# Patient Record
Sex: MALE | Race: WHITE
[De-identification: names, ages, dates, MRNs, and addresses within clinical notes are randomized per-mention and may not be internally consistent; named-entity substitution may affect disease eponyms.]

---

## 2016-12-28 VITALS
DIASTOLIC BLOOD PRESSURE: 89 MMHG | SYSTOLIC BLOOD PRESSURE: 156 MMHG | SYSTOLIC BLOOD PRESSURE: 156 MMHG | DIASTOLIC BLOOD PRESSURE: 89 MMHG | DIASTOLIC BLOOD PRESSURE: 89 MMHG | SYSTOLIC BLOOD PRESSURE: 156 MMHG

## 2017-04-18 ENCOUNTER — HOSPITAL ENCOUNTER (OUTPATIENT)
Dept: HOSPITAL 61 - PMGWOUND | Age: 57
Discharge: HOME | End: 2017-04-18
Attending: EMERGENCY MEDICINE
Payer: COMMERCIAL

## 2017-04-18 DIAGNOSIS — L89.153: Primary | ICD-10-CM

## 2017-04-18 DIAGNOSIS — Z87.891: ICD-10-CM

## 2017-04-18 DIAGNOSIS — L98.8: ICD-10-CM

## 2017-04-18 DIAGNOSIS — M19.90: ICD-10-CM

## 2017-04-18 PROCEDURE — 99214 OFFICE O/P EST MOD 30 MIN: CPT

## 2018-08-02 ENCOUNTER — HOSPITAL ENCOUNTER (OUTPATIENT)
Dept: HOSPITAL 61 - PMGWOUND | Age: 58
Discharge: HOME | End: 2018-08-02
Attending: EMERGENCY MEDICINE
Payer: COMMERCIAL

## 2018-08-02 DIAGNOSIS — T81.31XD: Primary | ICD-10-CM

## 2018-08-02 DIAGNOSIS — Z87.891: ICD-10-CM

## 2018-08-02 DIAGNOSIS — M19.90: ICD-10-CM

## 2018-08-02 DIAGNOSIS — Z85.048: ICD-10-CM
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
## 2018-08-02 PROCEDURE — 99213 OFFICE O/P EST LOW 20 MIN: CPT

## 2018-08-09 ENCOUNTER — HOSPITAL ENCOUNTER (OUTPATIENT)
Dept: HOSPITAL 61 - MRI | Age: 58
Discharge: HOME | End: 2018-08-09
Attending: EMERGENCY MEDICINE
Payer: COMMERCIAL

## 2018-08-09 DIAGNOSIS — Z85.048: ICD-10-CM

## 2018-08-09 DIAGNOSIS — Z87.891: ICD-10-CM

## 2018-08-09 DIAGNOSIS — T81.31XD: Primary | ICD-10-CM

## 2018-08-09 DIAGNOSIS — L89.159: ICD-10-CM

## 2018-08-09 PROCEDURE — 85025 COMPLETE CBC W/AUTO DIFF WBC: CPT

## 2018-08-09 PROCEDURE — 80069 RENAL FUNCTION PANEL: CPT

## 2018-08-09 PROCEDURE — A9585 GADOBUTROL INJECTION: HCPCS

## 2018-08-09 PROCEDURE — 85651 RBC SED RATE NONAUTOMATED: CPT

## 2018-08-09 PROCEDURE — 72197 MRI PELVIS W/O & W/DYE: CPT

## 2018-08-09 PROCEDURE — 36415 COLL VENOUS BLD VENIPUNCTURE: CPT

## 2018-08-09 NOTE — RAD
Examination: MRI of the pelvis without and with IV contrast

 

HISTORY: History of sacral pain, history of osteomyelitis, history of open

ulcer, history of bowel resection

 

COMPARISON: None available

 

TECHNIQUE: Multiplanar, sequence MR imaging of the pelvis were performed 

without and with IV contrast. IV contrast used was 10 mL of gadavist

 

FINDINGS:

 

There is a sacral decubitus ulcer in the lower back distal to the sacrum 

best visualized on series 10 image #15 with focal fluid collection 

measuring 2.2 x 1.3 cm could be secondary to decubitus ulcer with probable

abscess or phlegmon or fluid in the fistulous tract. The tract appears to 

extend into the posterior perineum along the right medial buttock possibly

to the anal region, however examination is limited due to significant 

motion artifact. The ulcer extends appears to extend inferior to the 

distal sacral coccygeal region however the distal portion the coccygeus 

demonstrates a decreased T1 and T2 signal could be osteomyelitis or due to

prior surgical changes.

 

IMPRESSION:

 

1. Sacral ulcer identified in the low back with 2.2 cm fluid collection 

could be abscess or phlegmon or fluid in the fistulous tract. The tract 

from the ulcer extends into the posterior peritoneum along the right 

medial buttock possibly to the anal region suggesting possible fistula.

 

2. There is decreased T1, T2 signal identified in the distal coccygeal 

region could be osteomyelitis or prior surgical changes. Recommend 

clinical correlation. Also consider bone scan and bony CT pelvis for 

further evaluation.

 

Electronically signed by: Alireza Vick MD (8/9/2018 12:21 PM) [facility]

## 2018-08-10 LAB
ALBUMIN SERPL-MCNC: 4 G/DL (ref 3.4–5)
ANION GAP SERPL CALC-SCNC: 9 MMOL/L (ref 6–14)
BASOPHILS # BLD AUTO: 0 X10^3/UL (ref 0–0.2)
BASOPHILS NFR BLD: 1 % (ref 0–3)
BUN SERPL-MCNC: 23 MG/DL (ref 8–26)
CALCIUM SERPL-MCNC: 9.8 MG/DL (ref 8.5–10.1)
CHLORIDE SERPL-SCNC: 103 MMOL/L (ref 98–107)
CO2 SERPL-SCNC: 28 MMOL/L (ref 21–32)
CREAT SERPL-MCNC: 1.3 MG/DL (ref 0.7–1.3)
EOSINOPHIL NFR BLD: 0.1 X10^3/UL (ref 0–0.7)
EOSINOPHIL NFR BLD: 1 % (ref 0–3)
ERYTHROCYTE [DISTWIDTH] IN BLOOD BY AUTOMATED COUNT: 13.7 % (ref 11.5–14.5)
GFR SERPLBLD BASED ON 1.73 SQ M-ARVRAT: 56.5 ML/MIN
GLUCOSE SERPL-MCNC: 117 MG/DL (ref 70–99)
HCT VFR BLD CALC: 48.4 % (ref 39–53)
HGB BLD-MCNC: 16.9 G/DL (ref 13–17.5)
LYMPHOCYTES # BLD: 1.9 X10^3/UL (ref 1–4.8)
LYMPHOCYTES NFR BLD AUTO: 21 % (ref 24–48)
MCH RBC QN AUTO: 33 PG (ref 25–35)
MCHC RBC AUTO-ENTMCNC: 35 G/DL (ref 31–37)
MCV RBC AUTO: 95 FL (ref 79–100)
MONO #: 0.9 X10^3/UL (ref 0–1.1)
MONOCYTES NFR BLD: 10 % (ref 0–9)
NEUT #: 6.1 X10^3UL (ref 1.8–7.7)
NEUTROPHILS NFR BLD AUTO: 68 % (ref 31–73)
PHOSPHATE SERPL-MCNC: 2.9 MG/DL (ref 2.6–4.7)
PLATELET # BLD AUTO: 197 X10^3/UL (ref 140–400)
POTASSIUM SERPL-SCNC: 3.9 MMOL/L (ref 3.5–5.1)
RBC # BLD AUTO: 5.11 X10^6/UL (ref 4.3–5.7)
SODIUM SERPL-SCNC: 140 MMOL/L (ref 136–145)
WBC # BLD AUTO: 9.1 X10^3/UL (ref 4–11)

## 2018-08-23 ENCOUNTER — HOSPITAL ENCOUNTER (OUTPATIENT)
Dept: HOSPITAL 61 - PMGWOUND | Age: 58
Discharge: HOME | End: 2018-08-23
Attending: EMERGENCY MEDICINE
Payer: COMMERCIAL

## 2018-08-23 DIAGNOSIS — M46.28: ICD-10-CM

## 2018-08-23 DIAGNOSIS — T81.31XS: Primary | ICD-10-CM

## 2018-08-23 DIAGNOSIS — M19.90: ICD-10-CM

## 2018-08-23 DIAGNOSIS — Z87.891: ICD-10-CM

## 2018-08-23 DIAGNOSIS — Z85.048: ICD-10-CM
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
## 2018-08-23 PROCEDURE — 99213 OFFICE O/P EST LOW 20 MIN: CPT

## 2018-08-23 PROCEDURE — 85651 RBC SED RATE NONAUTOMATED: CPT

## 2018-08-23 PROCEDURE — 80069 RENAL FUNCTION PANEL: CPT

## 2018-08-23 PROCEDURE — 36415 COLL VENOUS BLD VENIPUNCTURE: CPT

## 2018-08-23 PROCEDURE — 85025 COMPLETE CBC W/AUTO DIFF WBC: CPT

## 2018-09-11 ENCOUNTER — HOSPITAL ENCOUNTER (OUTPATIENT)
Dept: HOSPITAL 61 - SURG | Age: 58
Setting detail: OBSERVATION
LOS: 2 days | Discharge: HOME HEALTH | End: 2018-09-13
Attending: SURGERY | Admitting: SURGERY
Payer: COMMERCIAL

## 2018-09-11 VITALS — DIASTOLIC BLOOD PRESSURE: 51 MMHG | SYSTOLIC BLOOD PRESSURE: 96 MMHG

## 2018-09-11 VITALS — BODY MASS INDEX: 34.54 KG/M2 | WEIGHT: 220.06 LBS | HEIGHT: 67 IN

## 2018-09-11 VITALS — SYSTOLIC BLOOD PRESSURE: 115 MMHG | DIASTOLIC BLOOD PRESSURE: 64 MMHG

## 2018-09-11 VITALS — DIASTOLIC BLOOD PRESSURE: 41 MMHG | SYSTOLIC BLOOD PRESSURE: 91 MMHG

## 2018-09-11 VITALS — SYSTOLIC BLOOD PRESSURE: 129 MMHG | DIASTOLIC BLOOD PRESSURE: 74 MMHG

## 2018-09-11 DIAGNOSIS — Z93.3: ICD-10-CM

## 2018-09-11 DIAGNOSIS — K50.90: ICD-10-CM

## 2018-09-11 DIAGNOSIS — F17.210: ICD-10-CM

## 2018-09-11 DIAGNOSIS — K21.9: ICD-10-CM

## 2018-09-11 DIAGNOSIS — Z90.49: ICD-10-CM

## 2018-09-11 DIAGNOSIS — M46.28: ICD-10-CM

## 2018-09-11 DIAGNOSIS — L89.153: Primary | ICD-10-CM

## 2018-09-11 PROCEDURE — G0378 HOSPITAL OBSERVATION PER HR: HCPCS

## 2018-09-11 PROCEDURE — 97166 OT EVAL MOD COMPLEX 45 MIN: CPT

## 2018-09-11 PROCEDURE — 72192 CT PELVIS W/O DYE: CPT

## 2018-09-11 PROCEDURE — 15931 EXC SACRAL PR ULC PRIM SUTR: CPT

## 2018-09-11 PROCEDURE — A7015 AEROSOL MASK USED W NEBULIZE: HCPCS

## 2018-09-11 PROCEDURE — 97161 PT EVAL LOW COMPLEX 20 MIN: CPT

## 2018-09-11 PROCEDURE — G0379 DIRECT REFER HOSPITAL OBSERV: HCPCS

## 2018-09-11 PROCEDURE — 88304 TISSUE EXAM BY PATHOLOGIST: CPT

## 2018-09-11 PROCEDURE — 96372 THER/PROPH/DIAG INJ SC/IM: CPT

## 2018-09-11 RX ADMIN — SODIUM CHLORIDE, SODIUM LACTATE, POTASSIUM CHLORIDE, AND CALCIUM CHLORIDE SCH MLS/HR: .6; .31; .03; .02 INJECTION, SOLUTION INTRAVENOUS at 14:00

## 2018-09-11 RX ADMIN — ENOXAPARIN SODIUM SCH MG: 40 INJECTION SUBCUTANEOUS at 20:55

## 2018-09-11 RX ADMIN — SODIUM CHLORIDE, SODIUM LACTATE, POTASSIUM CHLORIDE, AND CALCIUM CHLORIDE SCH MLS/HR: .6; .31; .03; .02 INJECTION, SOLUTION INTRAVENOUS at 09:32

## 2018-09-11 RX ADMIN — SODIUM CHLORIDE, SODIUM LACTATE, POTASSIUM CHLORIDE, AND CALCIUM CHLORIDE SCH MLS/HR: .6; .31; .03; .02 INJECTION, SOLUTION INTRAVENOUS at 07:05

## 2018-09-11 RX ADMIN — DOCUSATE SODIUM SCH MG: 100 CAPSULE, LIQUID FILLED ORAL at 20:54

## 2018-09-11 RX ADMIN — HYDROCODONE BITARTRATE AND ACETAMINOPHEN PRN TAB: 5; 325 TABLET ORAL at 16:40

## 2018-09-11 RX ADMIN — SODIUM CHLORIDE, SODIUM LACTATE, POTASSIUM CHLORIDE, AND CALCIUM CHLORIDE SCH MLS/HR: .6; .31; .03; .02 INJECTION, SOLUTION INTRAVENOUS at 19:05

## 2018-09-11 NOTE — PDOC2
CONSULT


Date of Consult


Date of Consult


DATE: 9/11/18 


TIME: 14:43





Reason for Consult


Reason for Consult:


Negative pressure wound therapy placement and concern for chronic refractory 

osteomyelitis of the sacrum





Referring Physician


Referring Physician:


Dr. Davalos





Identification/Chief Complaint


Chief Complaint


Nonhealing sacral wound with concern for tracking to infectious site





Source


Source:  Chart review, Patient





History of Present Illness


Reason for Visit:


This is a very pleasant 58-year-old patient known to the wound care center for 

nonhealing perineal wound sits site of perineal resection. Concern has been 

raised on MRI for chronic sacral osteomyelitis. Patient has a remote history of 

sacral osteomyelitis and it is believed he underwent appropriate prolonged IV 

antibiotic therapy. Today Dr. Davalos performed debridement on the wound to 

identify any infectious tracking. At surgery bony involvement was not apparent. 

He is examined today for placement negative pressure wound therapy and follow-

up of MRI sacral osteomyelitis concern. 


Patient is examined in the bed. He is comfortable with postsurgical dressing in 

place.


Patient has had multiple surgical procedures including ileostomy and history of 

total colectomy and proctectomy.





Past Medical History


Past Medical History


Negative except as reported below


GI:  GERD, Other (past medical history of Crohn's)


Psych:  Depression


Endocrine:  Other (chronic nonhealing wounds that site of perineal resection.)





Past Surgical History


Past Surgical History:  Colon Resection, Other (proctectomy)





Social History


No


ALCOHOL:  none


Lives:  Alone


Domestic Violence:  Neg





Current Problem List


Problem List


Nonhealing sacral wound





Current Medications


Current Medications





Current Medications


Ondansetron HCl (Zofran) 4 mg PRN Q6HRS  PRN IV NAUSEA/VOMITING;  Start 9/11/18 

at 07:00;  Stop 9/12/18 at 06:59


Fentanyl Citrate (Fentanyl 2ml Vial) 25 mcg PRN Q5MIN  PRN IV MILD PAIN;  Start 

9/11/18 at 07:00;  Stop 9/12/18 at 06:59


Fentanyl Citrate (Fentanyl 2ml Vial) 50 mcg PRN Q5MIN  PRN IV MODERATE TO 

SEVERE PAIN;  Start 9/11/18 at 07:00;  Stop 9/12/18 at 06:59


Morphine Sulfate (Morphine Sulfate) 1 mg PRN Q10MIN  PRN IV SEVERE PAIN;  Start 

9/11/18 at 07:00;  Stop 9/12/18 at 06:59


Ringer's Solution 1,000 ml @  30 mls/hr Q24H IV  Last administered on 9/11/18at 

09:32;  Start 9/11/18 at 07:00;  Stop 9/11/18 at 18:59


Lidocaine HCl (Xylocaine-Mpf 1% 2ml Vial) 2 ml PRN 1X  PRN ID PRIOR TO IV START

;  Start 9/11/18 at 07:00;  Stop 9/12/18 at 06:59


Hydromorphone HCl (Dilaudid) 0.5 mg PRN Q10MIN  PRN IV SEV PAIN, Second choice;

  Start 9/11/18 at 07:00;  Stop 9/12/18 at 06:59


Prochlorperazine Edisylate (Compazine) 5 mg PACU PRN  PRN IV NAUSEA, MRX1;  

Start 9/11/18 at 07:00;  Stop 9/12/18 at 06:59


Propofol 20 ml @ As Directed STK-MED ONCE IV ;  Start 9/11/18 at 07:44;  Stop 9/ 11/18 at 07:45;  Status DC


Dexamethasone Sodium Phosphate (Decadron) 20 mg STK-MED ONCE .ROUTE ;  Start 9/ 11/18 at 07:44;  Stop 9/11/18 at 07:45;  Status DC


Ondansetron HCl (Zofran) 4 mg STK-MED ONCE .ROUTE ;  Start 9/11/18 at 07:44;  

Stop 9/11/18 at 07:45;  Status DC


Rocuronium Bromide (Zemuron) 50 mg STK-MED ONCE .ROUTE ;  Start 9/11/18 at 07:44

;  Stop 9/11/18 at 07:45;  Status DC


Fentanyl Citrate (Fentanyl 2ml Vial) 100 mcg STK-MED ONCE .ROUTE ;  Start 9/11/ 18 at 07:45;  Stop 9/11/18 at 07:46;  Status DC


Midazolam HCl (Versed) 2 mg STK-MED ONCE .ROUTE ;  Start 9/11/18 at 07:45;  

Stop 9/11/18 at 07:46;  Status DC


Multi-Ingred Cream/Lotion/Oil/ Oint (Artificial Tears Eye Ointment) 7 lakisha STK-

MED ONCE .ROUTE ;  Start 9/11/18 at 07:45;  Stop 9/11/18 at 07:46;  Status DC


Cefazolin Sodium/ Dextrose 50 ml @  100 mls/hr 1X  ONCE IV  Last administered 

on 9/11/18at 08:05;  Start 9/11/18 at 08:00;  Stop 9/11/18 at 08:29;  Status DC


Desflurane (Suprane) 30 ml STK-MED ONCE IH ;  Start 9/11/18 at 08:20;  Stop 9/11 /18 at 08:21;  Status DC


Neostigmine Methylsulfate (Neostigmine Methylsulfate) 5 mg STK-MED ONCE .ROUTE 

;  Start 9/11/18 at 08:47;  Stop 9/11/18 at 08:48;  Status DC


Glycopyrrolate (Robinul) 1 mg STK-MED ONCE .ROUTE ;  Start 9/11/18 at 08:48;  

Stop 9/11/18 at 08:49;  Status DC


Sevoflurane (Ultane) 8 ml STK-MED ONCE IH ;  Start 9/11/18 at 08:52;  Stop 9/11/ 18 at 08:53;  Status DC


Sevoflurane (Ultane) 8 ml STK-MED ONCE IH ;  Start 9/11/18 at 08:52;  Stop 9/11/ 18 at 08:53;  Status DC


Enoxaparin Sodium (Lovenox 40mg Syringe) 40 mg Q24H SQ ;  Start 9/11/18 at 21:00


Sodium Chloride (Normal Saline Flush) 3 ml QSHIFT  PRN IV AFTER MEDS AND BLOOD 

DRAWS;  Start 9/11/18 at 09:15


Ringer's Solution 1,000 ml @  100 mls/hr Q10H IV ;  Start 9/11/18 at 09:11


Acetaminophen/ Hydrocodone Bitart (Lortab 5/325) 1 tab PRN Q4HRS  PRN PO MILD 

PAIN;  Start 9/11/18 at 09:15


Docusate Sodium (Colace) 100 mg BID PO ;  Start 9/11/18 at 21:00


Ondansetron HCl (Zofran) 4 mg PRN Q6HRS  PRN IV NAUESA, 1ST CHOICE;  Start 9/11/ 18 at 09:15





Active Scripts


Active


Reported


Vitamin D2 (Ergocalciferol (Vitamin D2)) 50,000 Unit Capsule 1 Cap PO QMONTH


Vitamin C (Ascorbate Calcium) 500 Mg Tablet 500 Mg PO BID


Nutritional Drink Mix (Protein Supplement) 420 Gm Powder 420 Gm PO DAILY


Multi-Vitamin Daily (Multivitamin) 1 Each Tablet 1 Each PO 


Famotidine 20 Mg Tablet 20 Mg PO HS


Asacol Hd (Mesalamine) 800 Mg Tablet. 1,600 Mg PO TID





Allergies


Allergies:  


Coded Allergies:  


     No Known Drug Allergies (Unverified , 9/11/18)





ROS


Review of System


Negative except as reported below


Gastrointestinal:  Yes Other (ileostomy)


Skin:  Yes Other (nonhealing sacral wounds with history of prior sacral 

osteomyelitis.)





Physical Exam


General:  Alert, Oriented X3, Cooperative, No acute distress


HEENT:  Atraumatic, PERRLA, EOMI


Lungs:  Clear to auscultation, Normal air movement


Heart:  Regular rate


Abdomen:  Soft, No tenderness


Extremities:  No clubbing, No cyanosis


Skin:  Other (large sacral area postoperative wound.)


Neuro:  Normal gait, Normal speech


Psych/Mental Status:  Mental status NL, Mood NL


MUSCULOSKELETAL:  Not examined





Vitals


VITALS





Vital Signs








  Date Time  Temp Pulse Resp B/P (MAP) Pulse Ox O2 Delivery O2 Flow Rate FiO2


 


9/11/18 12:49      Room Air  


 


9/11/18 10:18 98.1 78 20 138/75 93   





 98.1       


 


9/11/18 09:33       10 











Images


Images


There is decreased T1, T2 signal identified in the distal coccygeal 


region could be osteomyelitis or prior surgical changes. Recommend 


clinical correlation. Also consider bone scan and bony CT pelvis for 


further evaluation. MRI 8/9/18





Assessment/Plan


Assessment/Plan


Stage IV pressure ulcer now status post wide surgical debridement with no 

evidence of abscess formation.


We'll ask infectious disease to consult regarding any persisting concern for 

sacral osteomyelitis.


Efforts to place wound VAC today when successful due to postsurgical tissue 

friability and tendency to bleed. We'll reassess tomorrow with likely placement 

at that time.


Appreciate surgical debridement efforts of general surgery.











DONIS GARCÍA DO Sep 11, 2018 15:02

## 2018-09-11 NOTE — PDOC
Infectious Disease Note


Vital Sign


Vital Signs





Vital Signs








  Date Time  Temp Pulse Resp B/P (MAP) Pulse Ox O2 Delivery O2 Flow Rate FiO2


 


9/11/18 10:18 98.1 78 20 138/75 93 Room Air  





 98.1       


 


9/11/18 09:33       10 











Objective


Assessment


Sacrococcygeal wound, ? fistula


? sacral osteomyelitis, op report noted, as well my exam does not indicate 

wound to bone


Crohns disease





Plan


Plan of Care


get ct sacrum


sed rate











DI VAIL MD Sep 11, 2018 13:02

## 2018-09-11 NOTE — PDOC
SURGICAL PROGRESS NOTE


Subjective


57 yo M with perineal ulcers


TO OR for debridement, possible closure


R/R/B/A d/w pt.  Risks, including, but not limited to:  bleeding, infection, 

damage to surrounding structures, risk of anesthesia, may need wound vac


Pt appears to understand, his questions are answered and he elects to proceed.


Office note H&P reviewed and unchanged.


Pt reexamined.


Vital Signs





Vital Signs








  Date Time  Temp Pulse Resp B/P (MAP) Pulse Ox O2 Delivery O2 Flow Rate FiO2


 


9/11/18 06:59 97.3 82  154/83 94   





 97.3       


 


9/11/18 06:53   20     

















ORI YOUNG MD Sep 11, 2018 08:03

## 2018-09-11 NOTE — CONS
DATE OF CONSULTATION:  09/11/2018



REQUESTING PHYSICIAN:  Dr. Davalos.



REASON FOR CONSULTATION:  Questionable sacral osteomyelitis.



HISTORY OF PRESENT ILLNESS:  This is a 58-year-old  gentleman with

Crohn's disease who has had fistula and problem with the wounds in the perianal

area for a long time.  The patient was evaluated by wound care.  The patient has

had MRI done on 08/09/2018 which showed a sacral ulcer with 2.2 cm fluid

collection, which was abscess versus phlegmon or fluid in the fistulous tract. 

Tract from the ulcer extends into the posterior peritoneum along the right

medial buttock, possibly into the anal region.  There was also decreased T1-T2

signal identified in the distal coccygeal region, could be osteomyelitis or

prior surgical changes.  The patient was taken to the OR by Dr. Davalos and had I

and D done, which included excisional debridement of the skin, subcutaneous

tissue and muscle.  No bone involvement clearly documented by Dr. Davalos, because

of this MRI consult has been requested.  The patient denies any fever, chills,

nausea, vomiting, diarrhea.  The patient unfortunately says this wound has been

off and on for years and he has been told that he has had fistula, that it would

get better and then get worse, etc.



PAST MEDICAL HISTORY:  Positive for Crohn's disease.  He has had colon surgery

done with colostomy.  In fact, he had a temporary colostomy or ileostomy and now

he has permanent one.  The patient also has had cataract removal done.  He has

had fistula repair done x 6 in the past.



SOCIAL HISTORY:  Negative for smoking.  Occasional alcohol use and occasional

cigarette or he smokes.



REVIEW OF SYSTEMS:  As per HPI, all other systems reviewed are negative.



CURRENT MEDICATIONS:  The patient is not on any antibiotics.  He did receive

Ancef before surgery.



PHYSICAL EXAMINATION:

GENERAL:  Alert and oriented gentleman, not in distress.

VITAL SIGNS:  Stable, afebrile.

HEENT:  NAD.

NECK:  Supple, no JVP, no lymphadenopathy.

LUNGS:  Clear.

HEART:  S1, S2 regular.

ABDOMEN:  Benign.

EXTREMITIES:  No edema, cyanosis.

SKIN:  Unremarkable.  He has a postsurgical wound, which was examined, very

healthy red granulation tissue without any necrosis, without any discharge and I

do not feel there is any fistulous tract to the bone or exposed bone.



LABORATORY DATA:  His sed rate is at 10.  White count is normal.  BUN and

creatinine is normal.  MRI as I mentioned earlier.



IMPRESSION AND PLAN:

1.  Sacrococcygeal wound, status post excisional debridement of the skin,

subcutaneous tissue, and muscle.  No bony involvement per surgery as well as to

my examination.

2.  Crohn's disease with history of multiple surgeries in that area with even

fistula repair done in the past.

3.  MRI report with questionable infection in the bone, clinically does not

appear to have infection in the bone as well as his sed rate is 10.  As

suggested by Radiology, CAT scan of the sacrococcyx can be done to evaluate and

I do not see the need for any antibiotics at this stage.



Thank you very much, Dr. Davalos, for giving me the opportunity to participate in

this patient's care.

 



______________________________

DI VAIL MD



DR:  GURWINDER/nts  JOB#:  [id number] / [id number]

DD:  09/11/2018 13:07  DT:  09/11/2018 21:45

## 2018-09-11 NOTE — PDOC4
OPERATIVE NOTE


Date:


Date:  Sep 11, 2018





Pre-Op Diagnosis:


Perineal ulcer





Post-Op Diagnosis:


same





Procedure Performed:


Excisional debridement of skin, subcutaneous tissue and muscle, no bony 

involvement





Surgeon:


Bharath Young





Anesthesia Type:


GETA





Blood Loss:


50





Specimans Obtained:


abscess cavity





Findings:


Abscess cavity involving skin, subcutaneous tissue and muscle, at the level of 

the coccyx, but infection not involving bone





Complications:


none





Operative Note:


After obtaining informed consent, patient was taken to OR, induced under GETA 

and prepped over the perineal area.  Abscess cavity over coccyx excised in an 

ellitical fashion using cautery.  Dissection continued down through skin, 

subcutaneous tissue and some muscle.  Abscess cavity appeared to extend down 

inferiorly to the coccyx.  Did not appear to involve bone.  Necrotic tissue 

excised and sent to pathology for evaluation.  Hemostasis obtained with 

cautery.  Areas of granulation tissue inferiorly ablated with cautery.  Wound 

packed with iodoform gauze.  Sterile dressing placed.  Given size of wound and 

depth of infection, favor wound care over layered closure.  Patient tolerated 

procedure well and sent to PACU in stable condition.   All counts correct.  

Wound class 4, dirty.











ORI YOUNG MD Sep 11, 2018 09:19

## 2018-09-11 NOTE — RAD
PQRS Compliance statement:

 

One or more of the following individualized dose reduction techniques were

utilized for this examination:

1. Automated exposure control.

2. Adjustment of the mA and/or kV according to patient size.

3. Use of iterative reconstruction technique.

 

INDICATION: Sacral ulcer. Evaluate for Sacral osteomyelitis.

 

TECHNIQUE: Noncontrast CT pelvis with multiplanar reformats

 

COMPARISON: MRI of the pelvis from 8/9/2018

 

FINDINGS:

Limited exam due to lack of IV contrast. Deep posterior ulcer is seen 

inferior to the sacrum. Coccyx is absent there is no extension of the 

ulcer into the presacral space. This extends to the inferior sacral spine.

There is thickening of the sacral pelvic fascia. Presacral inflammatory 

changes are seen. No free pelvic fluid. No loculated fluid collection to 

suggest abscess. Right lower quadrant ostomy with herniation of the small 

bowel loops. Fat-containing left lateral abdominal wall hernia with neck 

measuring 4.7 cm and hernia sac measuring 7.1 x 3.6 cm. The urinary 

bladder demonstrates no radiopaque stones. Prostate and seminal vesicles 

show no obvious mass lesion. No inguinal, pelvic or retroperitoneal 

adenopathy. No periosteal reaction or cortical erosion seen. Mild 

bilateral hip joint osteoarthritis. No suspicious bony lesion. Status post

total colectomy.

 

IMPRESSION:

Limited exam due to lack of IV contrast.

1. Deep soft tissue ulcer at the level of coccyx extending to the inferior

aspect of the sacrum with no obvious extension into the presacral fat. No 

fluid collection to suggest abscess. No cortical erosion of the inferior 

sacral vertebrae or periosteal reaction to suggest advanced acute 

osteomyelitis. However, it is not entirely ruled out given the proximity 

of the ulcer to the inferior sacral spine.

2. Right lower quadrant ostomy with parastomal herniation of the small 

bowel loops.

3. Presacral soft tissue inflammatory changes.

 

Electronically signed by: Shalom Tyler DO (9/11/2018 5:00 PM) [facility]

## 2018-09-12 VITALS — DIASTOLIC BLOOD PRESSURE: 73 MMHG | SYSTOLIC BLOOD PRESSURE: 132 MMHG

## 2018-09-12 VITALS — DIASTOLIC BLOOD PRESSURE: 54 MMHG | SYSTOLIC BLOOD PRESSURE: 135 MMHG

## 2018-09-12 VITALS — DIASTOLIC BLOOD PRESSURE: 48 MMHG | SYSTOLIC BLOOD PRESSURE: 112 MMHG

## 2018-09-12 VITALS — SYSTOLIC BLOOD PRESSURE: 121 MMHG | DIASTOLIC BLOOD PRESSURE: 67 MMHG

## 2018-09-12 VITALS — SYSTOLIC BLOOD PRESSURE: 105 MMHG | DIASTOLIC BLOOD PRESSURE: 49 MMHG

## 2018-09-12 VITALS — SYSTOLIC BLOOD PRESSURE: 128 MMHG | DIASTOLIC BLOOD PRESSURE: 73 MMHG

## 2018-09-12 RX ADMIN — ENOXAPARIN SODIUM SCH MG: 40 INJECTION SUBCUTANEOUS at 20:39

## 2018-09-12 RX ADMIN — SODIUM CHLORIDE, SODIUM LACTATE, POTASSIUM CHLORIDE, AND CALCIUM CHLORIDE SCH MLS/HR: .6; .31; .03; .02 INJECTION, SOLUTION INTRAVENOUS at 15:11

## 2018-09-12 RX ADMIN — DOCUSATE SODIUM SCH MG: 100 CAPSULE, LIQUID FILLED ORAL at 08:08

## 2018-09-12 RX ADMIN — SODIUM CHLORIDE, SODIUM LACTATE, POTASSIUM CHLORIDE, AND CALCIUM CHLORIDE SCH MLS/HR: .6; .31; .03; .02 INJECTION, SOLUTION INTRAVENOUS at 04:57

## 2018-09-12 RX ADMIN — DOCUSATE SODIUM SCH MG: 100 CAPSULE, LIQUID FILLED ORAL at 20:39

## 2018-09-12 RX ADMIN — HYDROCODONE BITARTRATE AND ACETAMINOPHEN PRN TAB: 5; 325 TABLET ORAL at 13:10

## 2018-09-12 RX ADMIN — HYDROCODONE BITARTRATE AND ACETAMINOPHEN PRN TAB: 5; 325 TABLET ORAL at 20:39

## 2018-09-12 NOTE — PDOC
SURGICAL PROGRESS NOTE


Subjective


Pt without new c/o, just got wound vac


Vital Signs





Vital Signs








  Date Time  Temp Pulse Resp B/P (MAP) Pulse Ox O2 Delivery O2 Flow Rate FiO2


 


9/12/18 15:00 97.7 69 20 112/48 (69) 95 Room Air  





 97.7       


 


9/12/18 14:07       10.0 








I&O











Intake and Output 


 


 9/12/18





 07:00


 


Intake Total 2740 ml


 


Output Total 2100 ml


 


Balance 640 ml


 


 


 


Intake Oral 1440 ml


 


IV Total 1300 ml


 


Output Urine Total 1800 ml


 


Stool Total 300 ml


 


# Voids 3








General:  Alert, Oriented X3, Cooperative, No acute distress


Abdomen:  Soft


Problem List


s/p perineal debridement


doing well


plan d/c home once wound vac arrangements made











ORI YOUNG MD Sep 12, 2018 19:37

## 2018-09-12 NOTE — PDOC
Infectious Disease Note


Subjective


Subjective


feeling good





ROS


ROS


no n/v/d/sob





Vital Sign


Vital Signs





Vital Signs








  Date Time  Temp Pulse Resp B/P (MAP) Pulse Ox O2 Delivery O2 Flow Rate FiO2


 


9/12/18 08:15      Room Air 10.0 


 


9/12/18 07:00 96.8 77 20 132/73 (92) 96   





 96.8       











Physical Exam


PHYSICAL EXAM


GENERAL:  Alert and oriented gentleman, not in distress.


VITAL SIGNS:  Stable, afebrile.


HEENT:  NAD.


NECK:  Supple, no JVP, no lymphadenopathy.


LUNGS:  Clear.


HEART:  S1, S2 regular.


ABDOMEN:  Benign.


EXTREMITIES:  No edema, cyanosis.


SKIN:  Unremarkable.  He has a postsurgical wound, which was examined, very


healthy red granulation tissue without any necrosis, without any discharge and I


do not feel there is any fistulous tract to the bone or exposed bone.





Labs


Micro


CT noted





Objective


Assessment


1.  Sacrococcygeal wound, status post excisional debridement of the skin,


subcutaneous tissue, and muscle.  No bony involvement per surgery as well as to


my examination.


2.  Crohn's disease with history of multiple surgeries in that area with even


fistula repair done in the past.


3.  MRI report with questionable infection in the bone, clinically does not


appear to have infection in the bone as well as his sed rate is 10. Also 

clearly reported by Dr Tabares,   As


suggested by Radiology, CAT scan of the sacrococcyx can be done to evaluate and


I do not see the need for any antibiotics at this stage.





Plan


Plan of Care


wound vac


no need for antibiotics


ok to d/c











DI VAIL MD Sep 12, 2018 09:58

## 2018-09-13 VITALS
SYSTOLIC BLOOD PRESSURE: 111 MMHG | DIASTOLIC BLOOD PRESSURE: 50 MMHG | DIASTOLIC BLOOD PRESSURE: 50 MMHG | SYSTOLIC BLOOD PRESSURE: 111 MMHG | SYSTOLIC BLOOD PRESSURE: 111 MMHG | DIASTOLIC BLOOD PRESSURE: 50 MMHG | DIASTOLIC BLOOD PRESSURE: 50 MMHG | SYSTOLIC BLOOD PRESSURE: 111 MMHG | SYSTOLIC BLOOD PRESSURE: 111 MMHG | SYSTOLIC BLOOD PRESSURE: 111 MMHG | SYSTOLIC BLOOD PRESSURE: 111 MMHG | DIASTOLIC BLOOD PRESSURE: 50 MMHG | DIASTOLIC BLOOD PRESSURE: 50 MMHG | DIASTOLIC BLOOD PRESSURE: 50 MMHG | SYSTOLIC BLOOD PRESSURE: 111 MMHG | SYSTOLIC BLOOD PRESSURE: 111 MMHG | SYSTOLIC BLOOD PRESSURE: 111 MMHG | SYSTOLIC BLOOD PRESSURE: 111 MMHG | DIASTOLIC BLOOD PRESSURE: 50 MMHG | SYSTOLIC BLOOD PRESSURE: 111 MMHG | SYSTOLIC BLOOD PRESSURE: 111 MMHG | DIASTOLIC BLOOD PRESSURE: 50 MMHG | DIASTOLIC BLOOD PRESSURE: 50 MMHG | SYSTOLIC BLOOD PRESSURE: 111 MMHG | SYSTOLIC BLOOD PRESSURE: 111 MMHG | DIASTOLIC BLOOD PRESSURE: 50 MMHG | SYSTOLIC BLOOD PRESSURE: 111 MMHG | DIASTOLIC BLOOD PRESSURE: 50 MMHG | DIASTOLIC BLOOD PRESSURE: 50 MMHG | SYSTOLIC BLOOD PRESSURE: 111 MMHG | DIASTOLIC BLOOD PRESSURE: 50 MMHG | DIASTOLIC BLOOD PRESSURE: 50 MMHG | DIASTOLIC BLOOD PRESSURE: 50 MMHG | SYSTOLIC BLOOD PRESSURE: 111 MMHG | DIASTOLIC BLOOD PRESSURE: 50 MMHG | DIASTOLIC BLOOD PRESSURE: 50 MMHG | SYSTOLIC BLOOD PRESSURE: 111 MMHG | DIASTOLIC BLOOD PRESSURE: 50 MMHG | SYSTOLIC BLOOD PRESSURE: 111 MMHG | DIASTOLIC BLOOD PRESSURE: 50 MMHG

## 2018-09-13 VITALS — SYSTOLIC BLOOD PRESSURE: 118 MMHG | DIASTOLIC BLOOD PRESSURE: 63 MMHG

## 2018-09-13 VITALS — SYSTOLIC BLOOD PRESSURE: 117 MMHG | DIASTOLIC BLOOD PRESSURE: 64 MMHG

## 2018-09-13 RX ADMIN — DOCUSATE SODIUM SCH MG: 100 CAPSULE, LIQUID FILLED ORAL at 09:00

## 2018-09-13 RX ADMIN — SODIUM CHLORIDE, SODIUM LACTATE, POTASSIUM CHLORIDE, AND CALCIUM CHLORIDE SCH MLS/HR: .6; .31; .03; .02 INJECTION, SOLUTION INTRAVENOUS at 00:46

## 2018-09-13 NOTE — PDOC
Infectious Disease Note


Subjective


Subjective


feeling good





ROS


ROS


no n/v/d/sob





Vital Sign


Vital Signs





Vital Signs








  Date Time  Temp Pulse Resp B/P (MAP) Pulse Ox O2 Delivery O2 Flow Rate FiO2


 


9/13/18 07:00 97.5 64 20 118/63 (81) 95 Room Air  





 97.5       


 


9/12/18 21:39       10.0 











Physical Exam


PHYSICAL EXAM


GENERAL:  Alert and oriented gentleman, not in distress.


VITAL SIGNS:  Stable, afebrile.


HEENT:  NAD.


NECK:  Supple, no JVP, no lymphadenopathy.


LUNGS:  Clear.


HEART:  S1, S2 regular.


ABDOMEN:  Benign.


EXTREMITIES:  No edema, cyanosis.


SKIN:  Unremarkable.  He has a postsurgical wound, which was examined, very


healthy red granulation tissue without any necrosis, without any discharge and I


do not feel there is any fistulous tract to the bone or exposed bone.





Labs


Micro


CT noted





Objective


Assessment


1.  Sacrococcygeal wound, status post excisional debridement of the skin,


subcutaneous tissue, and muscle.  No bony involvement per surgery as well as to


my examination.


2.  Crohn's disease with history of multiple surgeries in that area with even


fistula repair done in the past.


3.  MRI report with questionable infection in the bone, clinically does not


appear to have infection in the bone as well as his sed rate is 10. Also 

clearly reported by Dr Tabares,   As


suggested by Radiology, CAT scan of the sacrococcyx can be done to evaluate and


I do not see the need for any antibiotics at this stage.





Plan


Plan of Care


wound vac


no need for antibiotics


ok to pal/c











DI VAIL MD Sep 13, 2018 09:42

## 2018-09-13 NOTE — DISCH
DISCHARGE WITH HOME HEALTH


DISCHARGE INFORMATION:


Final Diagnosis:


Problems


Medical Problems:


(1) Stage III pressure ulcer of sacral region


Status: Acute  








Condition on Discharge:  Stable





CODE STATUS:


Code Status:  Full





HOME HEALTH:


Face to Face:


I certify this patient is under my care and that I, or a nurse practitioner or 

physician's assistant working with me, had a face to face encounter that meets 

the physician face to face encounter requirements with this patient on [].


Medical Complications:  Other (Perineal ulcer)


Skilled Nursing For:  Wound Care, Wound Vac


Physical Therapy For:  Evalulation/Treatment


Occupational Therapy For:  Evaluation/Treatment





POST DISCHARGE ORDERS:


Activity Instructions for Disc:  Activity as tolerated


Bathing Instructions:  Shower-keep dressing dry (wound vac precautions)


DIET AFTER DISCHARGE:  Regular


Wound/Incision Care:  Other, see below (wound vac dressings per home health)





FOLLOW-UP:


Follow up with:  Dr Davalos [phone]


Follow Up With:  F/U at wound care center as directed by them





CERTIFICATION STATEMENT:


Certification Statement:


Certification Statement: Based on the above finding, I certify that this 

patient is confined to the home and needs intermittent skilled nursing care, 

physical therapy and/or speech therapy, or continues to need occupational 

therapy.~ This patient is under my care, and I have initiated the establishment 

of the plan of care.~ This patient will be followed by myself or a community 

physician who will periodically review the plan of care.


Home Meds


Reported Medications


Ergocalciferol (Vitamin D2) (VITAMIN D2) 50,000 Unit Capsule, 1 CAP PO QMONTH, #

4 CAP 5 Refills


   9/10/18


Ascorbate Calcium (VITAMIN C) 500 Mg Tablet, 500 MG PO BID, TAB


   9/10/18


Protein Supplement (Nutritional Drink Mix) 420 Gm Powder, 420 GM PO DAILY


   12/1/16


Multivitamin (MULTI-VITAMIN DAILY) 1 Each Tablet, 1 EACH PO


   12/1/16


Famotidine (FAMOTIDINE) 20 Mg Tablet, 20 MG PO HS, TAB


   12/1/16


Mesalamine (ASACOL HD) 800 Mg Tablet., 1600 MG PO TID


   11/30/16











DIVINE DIALLO Sep 13, 2018 13:52

## 2018-09-13 NOTE — PDOC3
Discharge Summary


Visit Information


Date of Admission:  Sep 11, 2018


Date of Discharge:  Sep 13, 2018


Admitting Diagnosis:  Decub ulcer stage 3





Brief Hospital Course


Allergies





 Allergies








Coded Allergies Type Severity Reaction Last Updated Verified


 


  No Known Drug Allergies    9/11/18 No








Vital Signs





Vital Signs








  Date Time  Temp Pulse Resp B/P (MAP) Pulse Ox O2 Delivery O2 Flow Rate FiO2


 


9/13/18 07:00 97.5 64 20 118/63 (81) 95 Room Air  





 97.5       


 


9/12/18 21:39       10.0 








Brief Hospital Course


Mr. Kaur  is a 58 old m with perineal ulceration.  He underwent debridement in 

OR.  Wound vac placed on 9/12.  Doing well on day of d/c pending arrangements 

for wound vac at home.  Pt having minimal pain.





Discharge Information


Condition at Discharge:  Improved


Follow Up:  Weeks (1)


Disposition/Orders:  D/C to Home w/ HH


Scheduled


Ascorbate Calcium (Vitamin C) 500 Mg Tablet, 500 MG PO BID, (Reported)


   Entered as Reported by: MARVIN LUNA on 9/10/18 1440


   Last Taken: Unknown Dose on 9/10/18 1900     Last Action: Last Taken Edited 

on 9/11/18 0651 by Jenny Epstein


Ergocalciferol (Vitamin D2) (Vitamin D2) 50,000 Unit Capsule, 1 CAP PO QMONTH, #

4 Ref 5 (Reported)


   Entered as Reported by: MARVIN LUNA on 9/10/18 1442


   Last Action: New Order on 9/10/18 1442 by MARVIN LUNA


Famotidine (Famotidine) 20 Mg Tablet, 20 MG PO HS, (Reported)


   Entered as Reported by: JUN CRUZ on 12/1/16 0936


   Last Taken: Unknown Dose on 9/10/18 1900     Last Action: Last Taken Edited 

on 9/11/18 0651 by Jenny Epstein


Mesalamine (Asacol Hd) 800 Mg Tablet.dr, 1,600 MG PO TID, (Reported)


   Entered as Reported by: JUN CRUZ on 11/30/16 1604


   Last Taken: Unknown Dose on 9/11/18 0400     Last Action: Last Taken Edited 

on 9/11/18 0651 by Jenny Epstein


Protein Supplement (Nutritional Drink Mix) 420 Gm Powder, 420 GM PO DAILY, (

Reported)


   Entered as Reported by: JUN CRUZ on 12/1/16 0936


   Last Action: Reviewed on 9/10/18 1439 by MARVIN LUNA





Miscellaneous Medications


Multivitamin (Multi-Vitamin Daily) 1 Each Tablet, 1 EACH PO, (Reported)


   Entered as Reported by: JUN CRUZ on 12/1/16 0936


   Last Action: Reviewed on 9/10/18 1439 by ORI LUGO MD Sep 13, 2018 08:46

## 2018-09-13 NOTE — DISCH
DISCHARGE WITH HOME HEALTH


DISCHARGE INFORMATION:


Condition on Discharge:  Stable





CODE STATUS:


Code Status:  Full





HOME HEALTH:


Face to Face:


I certify this patient is under my care and that I, or a nurse practitioner or 

physician's assistant working with me, had a face to face encounter that meets 

the physician face to face encounter requirements with this patient on [].


Medical Complications:  Other (Perineal ulcer)


Skilled Nursing For:  Wound Care, Wound Vac





POST DISCHARGE ORDERS:


Activity Instructions for Disc:  Activity as tolerated


Bathing Instructions:  Shower-keep dressing dry (wound vac precautions)


DIET AFTER DISCHARGE:  Regular


Wound/Incision Care:  Other, see below (wound vac dressings per home health)





FOLLOW-UP:


Follow up with:  Dr Davalos [phone]


Follow Up With:  F/U at wound care center as directed by them





CERTIFICATION STATEMENT:


Certification Statement:


Certification Statement: Based on the above finding, I certify that this 

patient is confined to the home and needs intermittent skilled nursing care, 

physical therapy and/or speech therapy, or continues to need occupational 

therapy.~ This patient is under my care, and I have initiated the establishment 

of the plan of care.~ This patient will be followed by myself or a community 

physician who will periodically review the plan of care.


Home Meds


Reported Medications


Ergocalciferol (Vitamin D2) (VITAMIN D2) 50,000 Unit Capsule, 1 CAP PO QMONTH, #

4 CAP 5 Refills


   9/10/18


Ascorbate Calcium (VITAMIN C) 500 Mg Tablet, 500 MG PO BID, TAB


   9/10/18


Protein Supplement (Nutritional Drink Mix) 420 Gm Powder, 420 GM PO DAILY


   12/1/16


Multivitamin (MULTI-VITAMIN DAILY) 1 Each Tablet, 1 EACH PO


   12/1/16


Famotidine (FAMOTIDINE) 20 Mg Tablet, 20 MG PO HS, TAB


   12/1/16


Mesalamine (ASACOL HD) 800 Mg Tablet., 1600 MG PO TID


   11/30/16











DIVINE DIALLO Sep 13, 2018 12:42

## 2018-09-14 NOTE — PATHOLOGY
[facility] Accession Number: [id number]

.                                                                01

Material submitted:                                        .

SACRAL WOUND

.                                                                01

Clinical history:                                          .

Perineal abscess

.                                                                02

**********************************************************************

Diagnosis:

Skin and subcutaneous tissue "sacral wound", removal:

- Squamous epithelium with ulceration and associated acute and chronically

inflamed granulation tissue.

(SKM:pit 09/13/2018)

P/09/13/2018

**********************************************************************

.                                                                02

Electronically signed:                                     .

Indio Rodrigues MD, Pathologist

NPI- [id number]

.                                                                01

Gross description:                                         .

The specimen is received in formalin, labeled "Zachary Kaur, sacral wound".

Received is a segment of yellow-tan fibroadipose tissue with a slight

amount of attached pale tan skin measuring 3.4 x 2.7 x 1.7 cm in greatest

dimensions.  Sectioning reveals pale tan to slightly necrotic cut

surfaces.  The specimen is submitted representatively in cassette A1.

(CAA; 9/12/2018)

QAC/QAC

.                                                                02

Pathologist provided ICD-10:

L89.159

.                                                                02

CPT                                                        .

[id number]

Specimen Comment: A courtesy copy of this report has been sent to

Specimen Comment: [phone], [phone].

Specimen Comment: Report sent to  / DR ARREDONDO

***Performed at:  01

   [facility]

   [address], [place], [place]  [id number]

   MD Freddy Conner MD Phone:  [phone]

***Performed at:  02

   [facility]

   [address], [place]  [id number]

   MD Otoniel Hudson MD Phone:  [phone]

## 2018-09-20 ENCOUNTER — HOSPITAL ENCOUNTER (OUTPATIENT)
Dept: HOSPITAL 61 - PMGWOUND | Age: 58
Discharge: HOME | End: 2018-09-20
Attending: EMERGENCY MEDICINE
Payer: COMMERCIAL

## 2018-09-20 DIAGNOSIS — Z87.891: ICD-10-CM

## 2018-09-20 DIAGNOSIS — T81.31XD: Primary | ICD-10-CM
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
## 2018-09-20 DIAGNOSIS — Z90.49: ICD-10-CM

## 2018-09-20 DIAGNOSIS — M19.90: ICD-10-CM

## 2018-09-20 DIAGNOSIS — K21.9: ICD-10-CM

## 2018-09-20 PROCEDURE — G0463 HOSPITAL OUTPT CLINIC VISIT: HCPCS

## 2018-09-20 PROCEDURE — 99214 OFFICE O/P EST MOD 30 MIN: CPT

## 2018-09-27 ENCOUNTER — HOSPITAL ENCOUNTER (OUTPATIENT)
Dept: HOSPITAL 61 - PMGWOUND | Age: 58
Discharge: HOME | End: 2018-09-27
Attending: EMERGENCY MEDICINE
Payer: COMMERCIAL
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
## 2018-09-27 DIAGNOSIS — K21.9: ICD-10-CM

## 2018-09-27 DIAGNOSIS — Z90.49: ICD-10-CM

## 2018-09-27 DIAGNOSIS — Z87.891: ICD-10-CM

## 2018-09-27 DIAGNOSIS — M46.28: ICD-10-CM

## 2018-09-27 DIAGNOSIS — M19.90: ICD-10-CM

## 2018-09-27 DIAGNOSIS — Z85.048: ICD-10-CM

## 2018-09-27 DIAGNOSIS — T81.31XD: Primary | ICD-10-CM

## 2018-09-27 PROCEDURE — 11042 DBRDMT SUBQ TIS 1ST 20SQCM/<: CPT

## 2018-10-03 ENCOUNTER — HOSPITAL ENCOUNTER (OUTPATIENT)
Dept: HOSPITAL 61 - PMGWOUND | Age: 58
Discharge: HOME | End: 2018-10-03
Attending: EMERGENCY MEDICINE
Payer: COMMERCIAL
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
## 2018-10-03 DIAGNOSIS — Z85.048: ICD-10-CM

## 2018-10-03 DIAGNOSIS — Z90.49: ICD-10-CM

## 2018-10-03 DIAGNOSIS — T81.31XD: Primary | ICD-10-CM

## 2018-10-03 DIAGNOSIS — M19.90: ICD-10-CM

## 2018-10-03 DIAGNOSIS — Z87.891: ICD-10-CM

## 2018-10-03 DIAGNOSIS — M46.22: ICD-10-CM

## 2018-10-03 DIAGNOSIS — K21.9: ICD-10-CM

## 2018-10-03 PROCEDURE — G0463 HOSPITAL OUTPT CLINIC VISIT: HCPCS

## 2018-10-03 PROCEDURE — 99214 OFFICE O/P EST MOD 30 MIN: CPT

## 2018-10-11 ENCOUNTER — HOSPITAL ENCOUNTER (OUTPATIENT)
Dept: HOSPITAL 61 - PMGWOUND | Age: 58
Discharge: HOME | End: 2018-10-11
Attending: EMERGENCY MEDICINE
Payer: COMMERCIAL

## 2018-10-11 DIAGNOSIS — T81.31XD: Primary | ICD-10-CM

## 2018-10-11 DIAGNOSIS — M19.90: ICD-10-CM
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
## 2018-10-11 DIAGNOSIS — Z87.891: ICD-10-CM

## 2018-10-11 DIAGNOSIS — M46.22: ICD-10-CM

## 2018-10-11 DIAGNOSIS — Z90.49: ICD-10-CM

## 2018-10-11 DIAGNOSIS — K21.9: ICD-10-CM

## 2018-10-11 DIAGNOSIS — Z85.048: ICD-10-CM

## 2018-10-11 PROCEDURE — G0463 HOSPITAL OUTPT CLINIC VISIT: HCPCS

## 2018-10-11 PROCEDURE — 99214 OFFICE O/P EST MOD 30 MIN: CPT

## 2018-10-18 ENCOUNTER — HOSPITAL ENCOUNTER (OUTPATIENT)
Dept: HOSPITAL 61 - PMGWOUND | Age: 58
Discharge: HOME | End: 2018-10-18
Attending: EMERGENCY MEDICINE
Payer: COMMERCIAL
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
## 2018-10-18 DIAGNOSIS — Z90.49: ICD-10-CM

## 2018-10-18 DIAGNOSIS — T81.31XD: Primary | ICD-10-CM

## 2018-10-18 DIAGNOSIS — M46.22: ICD-10-CM

## 2018-10-18 DIAGNOSIS — Z87.891: ICD-10-CM

## 2018-10-18 DIAGNOSIS — M19.90: ICD-10-CM

## 2018-10-18 DIAGNOSIS — K21.9: ICD-10-CM

## 2018-10-18 DIAGNOSIS — M46.28: ICD-10-CM

## 2018-10-18 DIAGNOSIS — Z85.048: ICD-10-CM

## 2018-10-18 PROCEDURE — G0463 HOSPITAL OUTPT CLINIC VISIT: HCPCS

## 2018-10-18 PROCEDURE — 99214 OFFICE O/P EST MOD 30 MIN: CPT

## 2018-10-25 ENCOUNTER — HOSPITAL ENCOUNTER (OUTPATIENT)
Dept: HOSPITAL 61 - PMGWOUND | Age: 58
Discharge: HOME | End: 2018-10-25
Attending: EMERGENCY MEDICINE
Payer: COMMERCIAL

## 2018-10-25 DIAGNOSIS — Z90.49: ICD-10-CM

## 2018-10-25 DIAGNOSIS — T81.31XD: Primary | ICD-10-CM

## 2018-10-25 DIAGNOSIS — M19.90: ICD-10-CM

## 2018-10-25 DIAGNOSIS — K21.9: ICD-10-CM
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
## 2018-10-25 DIAGNOSIS — Z87.891: ICD-10-CM

## 2018-10-25 DIAGNOSIS — Z85.048: ICD-10-CM

## 2018-10-25 DIAGNOSIS — M46.28: ICD-10-CM

## 2018-10-25 PROCEDURE — 99215 OFFICE O/P EST HI 40 MIN: CPT

## 2018-11-01 ENCOUNTER — HOSPITAL ENCOUNTER (OUTPATIENT)
Dept: HOSPITAL 61 - PMGWOUND | Age: 58
Discharge: HOME | End: 2018-11-01
Attending: EMERGENCY MEDICINE
Payer: COMMERCIAL

## 2018-11-01 DIAGNOSIS — M19.90: ICD-10-CM

## 2018-11-01 DIAGNOSIS — Z90.49: ICD-10-CM

## 2018-11-01 DIAGNOSIS — K74.60: ICD-10-CM

## 2018-11-01 DIAGNOSIS — Z85.048: ICD-10-CM
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
## 2018-11-01 DIAGNOSIS — Z87.891: ICD-10-CM

## 2018-11-01 DIAGNOSIS — T81.31XD: Primary | ICD-10-CM

## 2018-11-01 DIAGNOSIS — K21.9: ICD-10-CM

## 2018-11-01 DIAGNOSIS — M46.28: ICD-10-CM

## 2018-11-01 PROCEDURE — G0463 HOSPITAL OUTPT CLINIC VISIT: HCPCS

## 2018-11-01 PROCEDURE — 99214 OFFICE O/P EST MOD 30 MIN: CPT

## 2018-11-08 ENCOUNTER — HOSPITAL ENCOUNTER (OUTPATIENT)
Dept: HOSPITAL 61 - PMGWOUND | Age: 58
Discharge: HOME | End: 2018-11-08
Attending: EMERGENCY MEDICINE
Payer: COMMERCIAL
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
## 2018-11-08 DIAGNOSIS — Z90.49: ICD-10-CM

## 2018-11-08 DIAGNOSIS — K74.60: ICD-10-CM

## 2018-11-08 DIAGNOSIS — T81.31XD: Primary | ICD-10-CM

## 2018-11-08 DIAGNOSIS — Z85.048: ICD-10-CM

## 2018-11-08 DIAGNOSIS — K21.9: ICD-10-CM

## 2018-11-08 DIAGNOSIS — Z87.891: ICD-10-CM

## 2018-11-08 DIAGNOSIS — M19.90: ICD-10-CM

## 2018-11-08 DIAGNOSIS — M46.22: ICD-10-CM

## 2018-11-08 DIAGNOSIS — M46.28: ICD-10-CM

## 2018-11-08 PROCEDURE — G0463 HOSPITAL OUTPT CLINIC VISIT: HCPCS

## 2018-11-08 PROCEDURE — 99214 OFFICE O/P EST MOD 30 MIN: CPT

## 2018-11-15 ENCOUNTER — HOSPITAL ENCOUNTER (OUTPATIENT)
Dept: HOSPITAL 61 - PMGWOUND | Age: 58
Discharge: HOME | End: 2018-11-15
Attending: EMERGENCY MEDICINE
Payer: COMMERCIAL

## 2018-11-15 DIAGNOSIS — K74.60: ICD-10-CM

## 2018-11-15 DIAGNOSIS — Z85.048: ICD-10-CM

## 2018-11-15 DIAGNOSIS — M19.90: ICD-10-CM

## 2018-11-15 DIAGNOSIS — T81.31XD: Primary | ICD-10-CM

## 2018-11-15 DIAGNOSIS — Z87.891: ICD-10-CM

## 2018-11-15 DIAGNOSIS — M46.22: ICD-10-CM

## 2018-11-15 DIAGNOSIS — M46.28: ICD-10-CM

## 2018-11-15 DIAGNOSIS — K21.9: ICD-10-CM

## 2018-11-15 DIAGNOSIS — Z90.49: ICD-10-CM
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
## 2018-11-15 PROCEDURE — 99214 OFFICE O/P EST MOD 30 MIN: CPT

## 2018-11-15 PROCEDURE — G0463 HOSPITAL OUTPT CLINIC VISIT: HCPCS

## 2018-11-29 ENCOUNTER — HOSPITAL ENCOUNTER (OUTPATIENT)
Dept: HOSPITAL 61 - PMGWOUND | Age: 58
Discharge: HOME | End: 2018-11-29
Attending: FAMILY MEDICINE
Payer: COMMERCIAL

## 2018-11-29 DIAGNOSIS — M46.28: ICD-10-CM

## 2018-11-29 DIAGNOSIS — M46.22: ICD-10-CM

## 2018-11-29 DIAGNOSIS — Z87.891: ICD-10-CM

## 2018-11-29 DIAGNOSIS — M19.90: ICD-10-CM

## 2018-11-29 DIAGNOSIS — Z85.048: ICD-10-CM

## 2018-11-29 DIAGNOSIS — K74.60: ICD-10-CM

## 2018-11-29 DIAGNOSIS — Z90.49: ICD-10-CM
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
## 2018-11-29 DIAGNOSIS — K21.9: ICD-10-CM

## 2018-11-29 DIAGNOSIS — T81.31XD: Primary | ICD-10-CM

## 2018-11-29 PROCEDURE — 99214 OFFICE O/P EST MOD 30 MIN: CPT

## 2018-11-29 PROCEDURE — G0463 HOSPITAL OUTPT CLINIC VISIT: HCPCS

## 2018-12-13 ENCOUNTER — HOSPITAL ENCOUNTER (OUTPATIENT)
Dept: HOSPITAL 61 - PMGWOUND | Age: 58
Discharge: HOME | End: 2018-12-13
Attending: EMERGENCY MEDICINE
Payer: COMMERCIAL

## 2018-12-13 DIAGNOSIS — M46.28: ICD-10-CM

## 2018-12-13 DIAGNOSIS — Z87.891: ICD-10-CM

## 2018-12-13 DIAGNOSIS — T81.31XD: Primary | ICD-10-CM

## 2018-12-13 DIAGNOSIS — M46.22: ICD-10-CM
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
## 2018-12-13 DIAGNOSIS — Z85.048: ICD-10-CM

## 2018-12-13 DIAGNOSIS — M19.90: ICD-10-CM

## 2018-12-13 DIAGNOSIS — K21.9: ICD-10-CM

## 2018-12-13 DIAGNOSIS — Z90.49: ICD-10-CM

## 2018-12-13 PROCEDURE — 99214 OFFICE O/P EST MOD 30 MIN: CPT

## 2018-12-13 PROCEDURE — G0463 HOSPITAL OUTPT CLINIC VISIT: HCPCS

## 2018-12-27 ENCOUNTER — HOSPITAL ENCOUNTER (OUTPATIENT)
Dept: HOSPITAL 61 - PMGWOUND | Age: 58
Discharge: HOME | End: 2018-12-27
Attending: EMERGENCY MEDICINE
Payer: COMMERCIAL

## 2018-12-27 DIAGNOSIS — Z87.891: ICD-10-CM

## 2018-12-27 DIAGNOSIS — M19.90: ICD-10-CM

## 2018-12-27 DIAGNOSIS — M46.28: ICD-10-CM

## 2018-12-27 DIAGNOSIS — Z90.49: ICD-10-CM

## 2018-12-27 DIAGNOSIS — T81.31XD: Primary | ICD-10-CM

## 2018-12-27 DIAGNOSIS — Z85.048: ICD-10-CM

## 2018-12-27 DIAGNOSIS — M46.22: ICD-10-CM
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
## 2018-12-27 PROCEDURE — 99215 OFFICE O/P EST HI 40 MIN: CPT

## 2019-01-10 ENCOUNTER — HOSPITAL ENCOUNTER (OUTPATIENT)
Dept: HOSPITAL 61 - PMGWOUND | Age: 59
Discharge: HOME | End: 2019-01-10
Attending: EMERGENCY MEDICINE
Payer: COMMERCIAL

## 2019-01-10 DIAGNOSIS — T81.31XD: Primary | ICD-10-CM

## 2019-01-10 DIAGNOSIS — Z87.891: ICD-10-CM

## 2019-01-10 DIAGNOSIS — M46.28: ICD-10-CM

## 2019-01-10 DIAGNOSIS — Z85.048: ICD-10-CM

## 2019-01-10 DIAGNOSIS — M19.90: ICD-10-CM

## 2019-01-10 DIAGNOSIS — Z90.49: ICD-10-CM

## 2019-01-10 DIAGNOSIS — E11.36: ICD-10-CM

## 2019-01-10 DIAGNOSIS — M46.22: ICD-10-CM

## 2019-01-10 DIAGNOSIS — K21.9: ICD-10-CM
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
## 2019-01-10 PROCEDURE — G0463 HOSPITAL OUTPT CLINIC VISIT: HCPCS

## 2019-01-10 PROCEDURE — 99214 OFFICE O/P EST MOD 30 MIN: CPT

## 2019-01-24 ENCOUNTER — HOSPITAL ENCOUNTER (OUTPATIENT)
Dept: HOSPITAL 61 - PMGWOUND | Age: 59
Discharge: HOME | End: 2019-01-24
Attending: EMERGENCY MEDICINE
Payer: COMMERCIAL

## 2019-01-24 DIAGNOSIS — T81.31XD: Primary | ICD-10-CM

## 2019-01-24 DIAGNOSIS — K21.9: ICD-10-CM

## 2019-01-24 DIAGNOSIS — M46.28: ICD-10-CM

## 2019-01-24 DIAGNOSIS — Z87.891: ICD-10-CM

## 2019-01-24 DIAGNOSIS — M19.90: ICD-10-CM

## 2019-01-24 DIAGNOSIS — Z85.048: ICD-10-CM

## 2019-01-24 DIAGNOSIS — M46.22: ICD-10-CM
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
## 2019-01-24 DIAGNOSIS — E11.36: ICD-10-CM

## 2019-01-24 DIAGNOSIS — Z90.49: ICD-10-CM

## 2019-01-24 PROCEDURE — G0463 HOSPITAL OUTPT CLINIC VISIT: HCPCS

## 2019-01-24 PROCEDURE — 99214 OFFICE O/P EST MOD 30 MIN: CPT

## 2019-01-31 ENCOUNTER — HOSPITAL ENCOUNTER (OUTPATIENT)
Dept: HOSPITAL 61 - PMGWOUND | Age: 59
Discharge: HOME | End: 2019-01-31
Attending: FAMILY MEDICINE
Payer: COMMERCIAL

## 2019-01-31 DIAGNOSIS — M46.28: ICD-10-CM

## 2019-01-31 DIAGNOSIS — Z90.49: ICD-10-CM

## 2019-01-31 DIAGNOSIS — Z87.891: ICD-10-CM
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
## 2019-01-31 DIAGNOSIS — E11.36: ICD-10-CM

## 2019-01-31 DIAGNOSIS — E11.69: ICD-10-CM

## 2019-01-31 DIAGNOSIS — Z85.048: ICD-10-CM

## 2019-01-31 DIAGNOSIS — T81.31XD: Primary | ICD-10-CM

## 2019-01-31 DIAGNOSIS — M46.22: ICD-10-CM

## 2019-01-31 DIAGNOSIS — K21.9: ICD-10-CM

## 2019-01-31 PROCEDURE — G0463 HOSPITAL OUTPT CLINIC VISIT: HCPCS

## 2019-01-31 PROCEDURE — 99214 OFFICE O/P EST MOD 30 MIN: CPT

## 2019-02-14 ENCOUNTER — HOSPITAL ENCOUNTER (OUTPATIENT)
Dept: HOSPITAL 61 - PMGWOUND | Age: 59
Discharge: HOME | End: 2019-02-14
Attending: EMERGENCY MEDICINE
Payer: COMMERCIAL

## 2019-02-14 DIAGNOSIS — M46.28: ICD-10-CM

## 2019-02-14 DIAGNOSIS — E11.36: ICD-10-CM

## 2019-02-14 DIAGNOSIS — Z87.891: ICD-10-CM

## 2019-02-14 DIAGNOSIS — T81.31XD: Primary | ICD-10-CM

## 2019-02-14 DIAGNOSIS — Z90.49: ICD-10-CM
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
## 2019-02-14 DIAGNOSIS — Z85.048: ICD-10-CM

## 2019-02-14 DIAGNOSIS — M46.22: ICD-10-CM

## 2019-02-14 DIAGNOSIS — M19.90: ICD-10-CM

## 2019-02-14 DIAGNOSIS — K21.9: ICD-10-CM

## 2019-02-14 PROCEDURE — G0463 HOSPITAL OUTPT CLINIC VISIT: HCPCS

## 2019-02-14 PROCEDURE — 99214 OFFICE O/P EST MOD 30 MIN: CPT

## 2019-02-28 ENCOUNTER — HOSPITAL ENCOUNTER (OUTPATIENT)
Dept: HOSPITAL 61 - PMGWOUND | Age: 59
Discharge: HOME | End: 2019-02-28
Attending: EMERGENCY MEDICINE
Payer: COMMERCIAL

## 2019-02-28 DIAGNOSIS — K21.9: ICD-10-CM

## 2019-02-28 DIAGNOSIS — T81.31XD: Primary | ICD-10-CM

## 2019-02-28 DIAGNOSIS — M46.28: ICD-10-CM

## 2019-02-28 DIAGNOSIS — M46.22: ICD-10-CM

## 2019-02-28 DIAGNOSIS — E11.36: ICD-10-CM

## 2019-02-28 DIAGNOSIS — Z87.891: ICD-10-CM
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
## 2019-02-28 DIAGNOSIS — Z85.048: ICD-10-CM

## 2019-02-28 DIAGNOSIS — M19.90: ICD-10-CM

## 2019-02-28 DIAGNOSIS — Z90.49: ICD-10-CM

## 2019-02-28 PROCEDURE — G0463 HOSPITAL OUTPT CLINIC VISIT: HCPCS

## 2019-02-28 PROCEDURE — 99214 OFFICE O/P EST MOD 30 MIN: CPT

## 2019-03-07 ENCOUNTER — HOSPITAL ENCOUNTER (OUTPATIENT)
Dept: HOSPITAL 61 - PMGWOUND | Age: 59
Discharge: HOME | End: 2019-03-07
Attending: EMERGENCY MEDICINE
Payer: COMMERCIAL

## 2019-03-07 DIAGNOSIS — M46.28: ICD-10-CM

## 2019-03-07 DIAGNOSIS — Z87.891: ICD-10-CM

## 2019-03-07 DIAGNOSIS — Z90.49: ICD-10-CM

## 2019-03-07 DIAGNOSIS — Z85.048: ICD-10-CM

## 2019-03-07 DIAGNOSIS — T81.31XD: Primary | ICD-10-CM

## 2019-03-07 DIAGNOSIS — E11.36: ICD-10-CM

## 2019-03-07 DIAGNOSIS — K21.9: ICD-10-CM

## 2019-03-07 DIAGNOSIS — M46.22: ICD-10-CM

## 2019-03-07 DIAGNOSIS — M19.90: ICD-10-CM
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
## 2019-03-07 PROCEDURE — G0463 HOSPITAL OUTPT CLINIC VISIT: HCPCS

## 2019-03-07 PROCEDURE — 99214 OFFICE O/P EST MOD 30 MIN: CPT

## 2019-03-21 ENCOUNTER — HOSPITAL ENCOUNTER (OUTPATIENT)
Dept: HOSPITAL 61 - PMGWOUND | Age: 59
Discharge: HOME | End: 2019-03-21
Attending: EMERGENCY MEDICINE
Payer: COMMERCIAL

## 2019-03-21 DIAGNOSIS — T81.31XD: Primary | ICD-10-CM

## 2019-03-21 DIAGNOSIS — Z87.891: ICD-10-CM

## 2019-03-21 DIAGNOSIS — Z85.048: ICD-10-CM

## 2019-03-21 DIAGNOSIS — M46.28: ICD-10-CM

## 2019-03-21 DIAGNOSIS — K21.9: ICD-10-CM

## 2019-03-21 DIAGNOSIS — E11.36: ICD-10-CM
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
## 2019-03-21 DIAGNOSIS — M19.90: ICD-10-CM

## 2019-03-21 DIAGNOSIS — Z90.49: ICD-10-CM

## 2019-03-21 DIAGNOSIS — M46.22: ICD-10-CM

## 2019-03-21 PROCEDURE — 17250 CHEM CAUT OF GRANLTJ TISSUE: CPT

## 2019-12-23 ENCOUNTER — HOSPITAL ENCOUNTER (INPATIENT)
Dept: HOSPITAL 61 - 5 SOUTH | Age: 59
LOS: 14 days | Discharge: HOME | DRG: 478 | End: 2020-01-06
Attending: INTERNAL MEDICINE | Admitting: INTERNAL MEDICINE
Payer: COMMERCIAL

## 2019-12-23 VITALS — DIASTOLIC BLOOD PRESSURE: 86 MMHG | SYSTOLIC BLOOD PRESSURE: 136 MMHG

## 2019-12-23 VITALS — DIASTOLIC BLOOD PRESSURE: 65 MMHG | SYSTOLIC BLOOD PRESSURE: 113 MMHG

## 2019-12-23 VITALS — SYSTOLIC BLOOD PRESSURE: 119 MMHG | DIASTOLIC BLOOD PRESSURE: 66 MMHG

## 2019-12-23 VITALS — DIASTOLIC BLOOD PRESSURE: 69 MMHG | SYSTOLIC BLOOD PRESSURE: 113 MMHG

## 2019-12-23 VITALS — WEIGHT: 196 LBS | BODY MASS INDEX: 28.06 KG/M2 | HEIGHT: 70 IN

## 2019-12-23 DIAGNOSIS — L98.499: ICD-10-CM

## 2019-12-23 DIAGNOSIS — N20.0: ICD-10-CM

## 2019-12-23 DIAGNOSIS — K21.9: ICD-10-CM

## 2019-12-23 DIAGNOSIS — K50.913: ICD-10-CM

## 2019-12-23 DIAGNOSIS — F32.9: ICD-10-CM

## 2019-12-23 DIAGNOSIS — Z82.49: ICD-10-CM

## 2019-12-23 DIAGNOSIS — M46.28: Primary | ICD-10-CM

## 2019-12-23 DIAGNOSIS — L89.159: ICD-10-CM

## 2019-12-23 DIAGNOSIS — E66.9: ICD-10-CM

## 2019-12-23 DIAGNOSIS — G89.29: ICD-10-CM

## 2019-12-23 DIAGNOSIS — F17.290: ICD-10-CM

## 2019-12-23 DIAGNOSIS — K43.5: ICD-10-CM

## 2019-12-23 DIAGNOSIS — K44.9: ICD-10-CM

## 2019-12-23 DIAGNOSIS — E78.5: ICD-10-CM

## 2019-12-23 DIAGNOSIS — I10: ICD-10-CM

## 2019-12-23 PROCEDURE — 87075 CULTR BACTERIA EXCEPT BLOOD: CPT

## 2019-12-23 PROCEDURE — 86304 IMMUNOASSAY TUMOR CA 125: CPT

## 2019-12-23 PROCEDURE — 82550 ASSAY OF CK (CPK): CPT

## 2019-12-23 PROCEDURE — 85651 RBC SED RATE NONAUTOMATED: CPT

## 2019-12-23 PROCEDURE — C1892 INTRO/SHEATH,FIXED,PEEL-AWAY: HCPCS

## 2019-12-23 PROCEDURE — 85610 PROTHROMBIN TIME: CPT

## 2019-12-23 PROCEDURE — 36573 INSJ PICC RS&I 5 YR+: CPT

## 2019-12-23 PROCEDURE — 74177 CT ABD & PELVIS W/CONTRAST: CPT

## 2019-12-23 PROCEDURE — 88307 TISSUE EXAM BY PATHOLOGIST: CPT

## 2019-12-23 PROCEDURE — 84443 ASSAY THYROID STIM HORMONE: CPT

## 2019-12-23 PROCEDURE — 36415 COLL VENOUS BLD VENIPUNCTURE: CPT

## 2019-12-23 PROCEDURE — 77012 CT SCAN FOR NEEDLE BIOPSY: CPT

## 2019-12-23 PROCEDURE — 87071 CULTURE AEROBIC QUANT OTHER: CPT

## 2019-12-23 PROCEDURE — 84145 PROCALCITONIN (PCT): CPT

## 2019-12-23 PROCEDURE — C1751 CATH, INF, PER/CENT/MIDLINE: HCPCS

## 2019-12-23 PROCEDURE — 80053 COMPREHEN METABOLIC PANEL: CPT

## 2019-12-23 PROCEDURE — 99152 MOD SED SAME PHYS/QHP 5/>YRS: CPT

## 2019-12-23 PROCEDURE — 20225 BONE BIOPSY TROCAR/NDL DEEP: CPT

## 2019-12-23 PROCEDURE — 80202 ASSAY OF VANCOMYCIN: CPT

## 2019-12-23 PROCEDURE — 86140 C-REACTIVE PROTEIN: CPT

## 2019-12-23 PROCEDURE — 85025 COMPLETE CBC W/AUTO DIFF WBC: CPT

## 2019-12-23 PROCEDURE — G0378 HOSPITAL OBSERVATION PER HR: HCPCS

## 2019-12-23 PROCEDURE — 77001 FLUOROGUIDE FOR VEIN DEVICE: CPT

## 2019-12-23 PROCEDURE — 80048 BASIC METABOLIC PNL TOTAL CA: CPT

## 2019-12-23 RX ADMIN — FAMOTIDINE SCH MG: 20 TABLET ORAL at 21:20

## 2019-12-23 RX ADMIN — MESALAMINE SCH MG: 400 CAPSULE, DELAYED RELEASE ORAL at 21:20

## 2019-12-23 RX ADMIN — CLINDAMYCIN HYDROCHLORIDE SCH MG: 150 CAPSULE ORAL at 21:20

## 2019-12-23 RX ADMIN — MESALAMINE SCH MG: 400 CAPSULE, DELAYED RELEASE ORAL at 14:28

## 2019-12-23 RX ADMIN — OXYCODONE HYDROCHLORIDE AND ACETAMINOPHEN SCH MG: 500 TABLET ORAL at 21:20

## 2019-12-23 RX ADMIN — CLINDAMYCIN HYDROCHLORIDE SCH MG: 150 CAPSULE ORAL at 14:28

## 2019-12-23 NOTE — PDOC
Provider Note


Provider Note


SURG    Arpit for Dr Davalos





full not to follow


pt with long hx of Crohn's with intermittent fistulae


seen last week by Dr Davalos in the office


was having some bleeding from perianal area earlier today when seen in the C


dressing now dry and intact





no acute surgical recs


will follow





Thanks for consult











PORTILLO ODONNELL MD                Dec 23, 2019 16:05

## 2019-12-23 NOTE — NUR
Wound Care:

Pt direct admitted from Wound Clinic to room 562. During routine clinic visit, pt wounds 
began to bleed profusely during examination. Additionally, a new open area with 7cm of 
tunnelling was discovered, probing to bone in several areas. Pt states he has been having 
chills at home. Wound culture obtained, Dr. Dunaway contacted [facility] Supervisor to 
direct admit for uncontrolled bleeding from wound under Dr. Marmolejo. Recommending a consult 
to ID, and a CT of his sacral area to rule out osteomyelitis. Bleeding controlled prior to 
transport to admitting, wounds packed with 1/2" iodoform gauze and covered with ABDs. Dr. Dunaway consulted with Dr. Davalos by phone, who plans to follow up with patient later this 
week. Will follow up wound care on 12/27/19

## 2019-12-23 NOTE — HP
ADMIT DATE:  12/23/2019



CHIEF COMPLAINT:  Coccygeal wound secondary to Crohn's.



HISTORY OF PRESENT ILLNESS:  The patient is a pleasant middle-aged male who has

severe Crohn's.  He has an ostomy in the right lower quadrant.  He also has

multiple wounds on his abdomen.  He has history of flares with flare of his

Crohn's with fistulas.  He now has a wound on his coccyx.  He was seen in the

Wound Care Clinic.  He was sent here today for continued IV antibiotics and

consultation with Infectious Disease.  I am also going to consult his surgeon,

Dr. Davalos.



PAST MEDICAL HISTORY:  Advanced Crohn's with multiple surgeries, ostomy,

multiple fissures, chronic pain, hyperlipidemia, hypertension, GERD.



ALLERGIES:  None.



FAMILY HISTORY:  Coronary artery disease.



SOCIAL HISTORY:  Does not drink, smoke or take drugs.



MEDICATIONS:  Reviewed, please refer to the MRAD.



REVIEW OF SYSTEMS:

REVIEW OF SYSTEMS:  

GENERAL:  No history of weight change, weakness or fevers.

SKIN:  He complains of Crohn's of fistulas on his coccyx.

EYES:  No blurred, double or loss of vision.

NOSE AND THROAT:  No history of nosebleeds, hoarseness or sore throat.

HEART:  No history of palpitations, chest pain or shortness of breath on

exertion.

LUNGS:  Denies cough, hemoptysis, wheezing or shortness of breath.

GASTROINTESTINAL:  Denies changes in appetite, nausea, vomiting, diarrhea or

constipation.

GENITOURINARY:  No history of frequency, urgency, hesitancy or nocturia.

NEUROLOGIC:  Denies history of numbness, tingling, tremor or weakness.

PSYCHIATRIC:  No history of panic, anxiety or depression.

ENDOCRINE:  No history of heat or cold intolerance, polyuria or polydipsia.

EXTREMITIES:  Denies muscle weakness, joint pain, pain on walking or stiffness.



PHYSICAL EXAMINATION:

VITALS:  Within normal limits and are stable.

GENERAL:  No apparent distress.  Alert and oriented.

HEENT:  Normal cephalic atraumatic, external auditory canals are patent

EYES:  Extraocular muscles are intact, pupils are equally round and reactive to

light and accommodation

MUSCULOSKELETAL:  Well developed, well nourished, good range of motion

ENDOCRINE:  No thyromegaly was palpated

LYMPHATICS:  No cervical chain or axillary nodes were noted

HEMATOPOIETIC:  No bruising

NECK:  Supple, no JVD, no thyromegaly was noted.

LUNGS:  Clear to auscultation in all lung fields without rhonchi or wheezing.

HEART:  RRR, S1, S2 present.  Peripheral pulses intact, no obvious murmurs were

noted.

ABDOMEN:  He has got multiple old incisions on his abdomen.  He has had right

lower quadrant ostomy. 

EXTREMITIES:  Without any cyanosis, clubbing, or edema.  Pedal pulses intact,

Homans sign is negative.

NEUROLOGIC:  Normal speech, normal tone.  A & O x3, moves all extremities, no

obvious focal deficits.

PSYCHIATRIC:  Normal affect, normal mood.  Stable.

SKIN:  Please see the pictures of the coccyx.  He has got fistulas.

VASCULAR:  Good capillary refill, neurovascular bundle appears to be intact.



LABORATORY DATA:  Pending.



ASSESSMENT AND PLAN:  Progression of Crohn's with fistulas.  The patient will be

admitted.  We will start IV antibiotics.  Consult ID, consult General Surgery,

consult wound care, home meds, DVT prophylaxis. Full code.  P.r.n. pain

medicine.

 



______________________________

RANDALL PAULSON DO



DR:  JC/jeanne  JOB#:  [id number] / [id number]

DD:  12/23/2019 13:52  DT:  12/23/2019 14:10

## 2019-12-24 VITALS — DIASTOLIC BLOOD PRESSURE: 61 MMHG | SYSTOLIC BLOOD PRESSURE: 111 MMHG

## 2019-12-24 VITALS — DIASTOLIC BLOOD PRESSURE: 49 MMHG | SYSTOLIC BLOOD PRESSURE: 97 MMHG

## 2019-12-24 VITALS — DIASTOLIC BLOOD PRESSURE: 71 MMHG | SYSTOLIC BLOOD PRESSURE: 122 MMHG

## 2019-12-24 VITALS — DIASTOLIC BLOOD PRESSURE: 61 MMHG | SYSTOLIC BLOOD PRESSURE: 112 MMHG

## 2019-12-24 VITALS — SYSTOLIC BLOOD PRESSURE: 101 MMHG | DIASTOLIC BLOOD PRESSURE: 70 MMHG

## 2019-12-24 VITALS — DIASTOLIC BLOOD PRESSURE: 66 MMHG | SYSTOLIC BLOOD PRESSURE: 113 MMHG

## 2019-12-24 LAB
ANION GAP SERPL CALC-SCNC: 8 MMOL/L (ref 6–14)
BASOPHILS # BLD AUTO: 0 X10^3/UL (ref 0–0.2)
BASOPHILS NFR BLD: 0 % (ref 0–3)
BUN SERPL-MCNC: 11 MG/DL (ref 8–26)
CALCIUM SERPL-MCNC: 9 MG/DL (ref 8.5–10.1)
CHLORIDE SERPL-SCNC: 106 MMOL/L (ref 98–107)
CO2 SERPL-SCNC: 27 MMOL/L (ref 21–32)
CREAT SERPL-MCNC: 1.1 MG/DL (ref 0.7–1.3)
EOSINOPHIL NFR BLD: 0.2 X10^3/UL (ref 0–0.7)
EOSINOPHIL NFR BLD: 3 % (ref 0–3)
ERYTHROCYTE [DISTWIDTH] IN BLOOD BY AUTOMATED COUNT: 13.5 % (ref 11.5–14.5)
GFR SERPLBLD BASED ON 1.73 SQ M-ARVRAT: 68.3 ML/MIN
GLUCOSE SERPL-MCNC: 114 MG/DL (ref 70–99)
HCT VFR BLD CALC: 39.5 % (ref 39–53)
HGB BLD-MCNC: 13.2 G/DL (ref 13–17.5)
LYMPHOCYTES # BLD: 0.8 X10^3/UL (ref 1–4.8)
LYMPHOCYTES NFR BLD AUTO: 14 % (ref 24–48)
MCH RBC QN AUTO: 30 PG (ref 25–35)
MCHC RBC AUTO-ENTMCNC: 34 G/DL (ref 31–37)
MCV RBC AUTO: 90 FL (ref 79–100)
MONO #: 0.7 X10^3/UL (ref 0–1.1)
MONOCYTES NFR BLD: 13 % (ref 0–9)
NEUT #: 4.1 X10^3/UL (ref 1.8–7.7)
NEUTROPHILS NFR BLD AUTO: 71 % (ref 31–73)
PLATELET # BLD AUTO: 163 X10^3/UL (ref 140–400)
POTASSIUM SERPL-SCNC: 4.2 MMOL/L (ref 3.5–5.1)
RBC # BLD AUTO: 4.37 X10^6/UL (ref 4.3–5.7)
SODIUM SERPL-SCNC: 141 MMOL/L (ref 136–145)
WBC # BLD AUTO: 5.8 X10^3/UL (ref 4–11)

## 2019-12-24 RX ADMIN — MESALAMINE SCH MG: 400 CAPSULE, DELAYED RELEASE ORAL at 21:01

## 2019-12-24 RX ADMIN — OXYCODONE HYDROCHLORIDE AND ACETAMINOPHEN SCH MG: 500 TABLET ORAL at 21:01

## 2019-12-24 RX ADMIN — CLINDAMYCIN HYDROCHLORIDE SCH MG: 150 CAPSULE ORAL at 08:39

## 2019-12-24 RX ADMIN — MESALAMINE SCH MG: 400 CAPSULE, DELAYED RELEASE ORAL at 14:33

## 2019-12-24 RX ADMIN — MESALAMINE SCH MG: 400 CAPSULE, DELAYED RELEASE ORAL at 08:39

## 2019-12-24 RX ADMIN — OXYCODONE HYDROCHLORIDE AND ACETAMINOPHEN SCH MG: 500 TABLET ORAL at 08:39

## 2019-12-24 RX ADMIN — FAMOTIDINE SCH MG: 20 TABLET ORAL at 21:01

## 2019-12-24 NOTE — PDOC
PROGRESS NOTES


Chief Complaint


Chief Complaint


Perianal wound - with bleeding


Severe Crohn's with multiple surgeries s/p ostomy (multiple fissures and 

abdominal wounds)


Chronic pain


Hyperlipidemia


Hypertension


GERD





History of Present Illness


History of Present Illness


Mr Kaur is a 58yo M w/ PMHx severe Crohn's with multiple surgeries s/p ostomy 

(multiple fissures and abdominal wounds), chronic pain, hyperlipidemia, 

hypertension, GERD with a wound on his coccyx who was sent from wound care 

clinic on 12/23/19 for bleeding from perianal area. Started on IV antibiotics 

and consulted General surgery and Infectious Disease. 





ESR and CRP elevated. He is having some bloody drainage. He states he is pretty 

comfortable, has been through this before. No fever or chills. No increase in 

ostomy output. No CP or SOB





Vitals


Vitals





Vital Signs








  Date Time  Temp Pulse Resp B/P (MAP) Pulse Ox O2 Delivery O2 Flow Rate FiO2


 


12/24/19 03:00 98.3 74 18 122/71 (88) 96 Room Air  





 98.3       











Physical Exam


General:  Alert, Oriented X3, Cooperative


Heart:  Regular rate, Normal S1, Normal S2


Lungs:  Clear


Extremities:  No clubbing, No cyanosis


Skin:  No rashes, No breakdown





Labs


LABS





Laboratory Tests








Test


 12/24/19


04:35


 


White Blood Count


 5.8 x10^3/uL


(4.0-11.0)


 


Red Blood Count


 4.37 x10^6/uL


(4.30-5.70)


 


Hemoglobin


 13.2 g/dL


(13.0-17.5)


 


Hematocrit


 39.5 %


(39.0-53.0)


 


Mean Corpuscular Volume 90 fL () 


 


Mean Corpuscular Hemoglobin 30 pg (25-35) 


 


Mean Corpuscular Hemoglobin


Concent 34 g/dL


(31-37)


 


Red Cell Distribution Width


 13.5 %


(11.5-14.5)


 


Platelet Count


 163 x10^3/uL


(140-400)


 


Neutrophils (%) (Auto) 71 % (31-73) 


 


Lymphocytes (%) (Auto) 14 % (24-48) 


 


Monocytes (%) (Auto) 13 % (0-9) 


 


Eosinophils (%) (Auto) 3 % (0-3) 


 


Basophils (%) (Auto) 0 % (0-3) 


 


Neutrophils # (Auto)


 4.1 x10^3/uL


(1.8-7.7)


 


Lymphocytes # (Auto)


 0.8 x10^3/uL


(1.0-4.8)


 


Monocytes # (Auto)


 0.7 x10^3/uL


(0.0-1.1)


 


Eosinophils # (Auto)


 0.2 x10^3/uL


(0.0-0.7)


 


Basophils # (Auto)


 0.0 x10^3/uL


(0.0-0.2)


 


Sodium Level


 141 mmol/L


(136-145)


 


Potassium Level


 4.2 mmol/L


(3.5-5.1)


 


Chloride Level


 106 mmol/L


()


 


Carbon Dioxide Level


 27 mmol/L


(21-32)


 


Anion Gap 8 (6-14) 


 


Blood Urea Nitrogen


 11 mg/dL


(8-26)


 


Creatinine


 1.1 mg/dL


(0.7-1.3)


 


Estimated GFR


(Cockcroft-Gault) 68.3 





 


Glucose Level


 114 mg/dL


(70-99)


 


Calcium Level


 9.0 mg/dL


(8.5-10.1)











Comment


Review of Relevant


I have reviewed the following items crow (where applicable) has been applied.


Labs





Laboratory Tests








Test


 12/24/19


04:35


 


White Blood Count


 5.8 x10^3/uL


(4.0-11.0)


 


Red Blood Count


 4.37 x10^6/uL


(4.30-5.70)


 


Hemoglobin


 13.2 g/dL


(13.0-17.5)


 


Hematocrit


 39.5 %


(39.0-53.0)


 


Mean Corpuscular Volume 90 fL () 


 


Mean Corpuscular Hemoglobin 30 pg (25-35) 


 


Mean Corpuscular Hemoglobin


Concent 34 g/dL


(31-37)


 


Red Cell Distribution Width


 13.5 %


(11.5-14.5)


 


Platelet Count


 163 x10^3/uL


(140-400)


 


Neutrophils (%) (Auto) 71 % (31-73) 


 


Lymphocytes (%) (Auto) 14 % (24-48) 


 


Monocytes (%) (Auto) 13 % (0-9) 


 


Eosinophils (%) (Auto) 3 % (0-3) 


 


Basophils (%) (Auto) 0 % (0-3) 


 


Neutrophils # (Auto)


 4.1 x10^3/uL


(1.8-7.7)


 


Lymphocytes # (Auto)


 0.8 x10^3/uL


(1.0-4.8)


 


Monocytes # (Auto)


 0.7 x10^3/uL


(0.0-1.1)


 


Eosinophils # (Auto)


 0.2 x10^3/uL


(0.0-0.7)


 


Basophils # (Auto)


 0.0 x10^3/uL


(0.0-0.2)


 


Sodium Level


 141 mmol/L


(136-145)


 


Potassium Level


 4.2 mmol/L


(3.5-5.1)


 


Chloride Level


 106 mmol/L


()


 


Carbon Dioxide Level


 27 mmol/L


(21-32)


 


Anion Gap 8 (6-14) 


 


Blood Urea Nitrogen


 11 mg/dL


(8-26)


 


Creatinine


 1.1 mg/dL


(0.7-1.3)


 


Estimated GFR


(Cockcroft-Gault) 68.3 





 


Glucose Level


 114 mg/dL


(70-99)


 


Calcium Level


 9.0 mg/dL


(8.5-10.1)








Laboratory Tests








Test


 12/24/19


04:35


 


White Blood Count


 5.8 x10^3/uL


(4.0-11.0)


 


Red Blood Count


 4.37 x10^6/uL


(4.30-5.70)


 


Hemoglobin


 13.2 g/dL


(13.0-17.5)


 


Hematocrit


 39.5 %


(39.0-53.0)


 


Mean Corpuscular Volume 90 fL () 


 


Mean Corpuscular Hemoglobin 30 pg (25-35) 


 


Mean Corpuscular Hemoglobin


Concent 34 g/dL


(31-37)


 


Red Cell Distribution Width


 13.5 %


(11.5-14.5)


 


Platelet Count


 163 x10^3/uL


(140-400)


 


Neutrophils (%) (Auto) 71 % (31-73) 


 


Lymphocytes (%) (Auto) 14 % (24-48) 


 


Monocytes (%) (Auto) 13 % (0-9) 


 


Eosinophils (%) (Auto) 3 % (0-3) 


 


Basophils (%) (Auto) 0 % (0-3) 


 


Neutrophils # (Auto)


 4.1 x10^3/uL


(1.8-7.7)


 


Lymphocytes # (Auto)


 0.8 x10^3/uL


(1.0-4.8)


 


Monocytes # (Auto)


 0.7 x10^3/uL


(0.0-1.1)


 


Eosinophils # (Auto)


 0.2 x10^3/uL


(0.0-0.7)


 


Basophils # (Auto)


 0.0 x10^3/uL


(0.0-0.2)


 


Sodium Level


 141 mmol/L


(136-145)


 


Potassium Level


 4.2 mmol/L


(3.5-5.1)


 


Chloride Level


 106 mmol/L


()


 


Carbon Dioxide Level


 27 mmol/L


(21-32)


 


Anion Gap 8 (6-14) 


 


Blood Urea Nitrogen


 11 mg/dL


(8-26)


 


Creatinine


 1.1 mg/dL


(0.7-1.3)


 


Estimated GFR


(Cockcroft-Gault) 68.3 





 


Glucose Level


 114 mg/dL


(70-99)


 


Calcium Level


 9.0 mg/dL


(8.5-10.1)








Medications





Current Medications


Acetaminophen (Tylenol) 650 mg PRN Q4HRS  PRN PO PAIN;  Start 12/23/19 at 14:00


Ergocalciferol (Vitamin D2) 50,000 unit QMONTH PO ;  Start 1/22/20 at 09:00


Famotidine (Pepcid) 20 mg HS PO  Last administered on 12/23/19at 21:20;  Start 

12/23/19 at 21:00


Guaifenesin (Robitussin Dm) 5 ml PRN QID  PRN PO COUGH;  Start 12/23/19 at 14:00


Ascorbic Acid (Vitamin C) 500 mg BID PO  Last administered on 12/23/19at 21:20; 

Start 12/23/19 at 21:00


Clindamycin HCl (Cleocin) 300 mg TID PO  Last administered on 12/23/19at 21:20; 

Start 12/23/19 at 14:00


Magnesium Hydroxide (Milk Of Magnesia) 2,400 mg PRN DAILY  PRN PO CONSTIPATION; 

Start 12/23/19 at 14:00


Mesalamine (Delzicol) 800 mg TID PO  Last administered on 12/23/19at 21:20;  

Start 12/23/19 at 15:00


Non-Formulary Medication (Protein Supplement (Nutritional Drink Mix)) 420 gm 

DAILY PO ;  Start 12/24/19 at 09:00;  Status UNV





Active Scripts


Active


Reported


Hydrocodone-Apap 5-325  ** (Hydrocodone Bit/Acetaminophen) 1 Tab Tablet 1-2 Tab 

PO PRN Q4HRS PRN


Tussin Dm Cough & Chest Syrup (Guaifenesin/Dextromethorphan) 118 Ml Syrup 5 Ml 

PO PRN QID PRN 12 Days


Milk Of Magnesia (Magnesium Hydroxide) 2,400 Mg/10 Ml Oral.susp 2,400 Mg PO PRN 

DAILY PRN


Tylenol (Acetaminophen) 325 Mg Tablet 650 Mg PO PRN Q4HRS PRN


Clindamycin Hcl 300 Mg Capsule 300 Mg PO TID


Vitamin D2 (Ergocalciferol (Vitamin D2)) 50,000 Unit Capsule 1 Cap PO QMONTH


Vitamin C (Ascorbate Calcium) 500 Mg Tablet 500 Mg PO BID


Nutritional Drink Mix (Protein Supplement) 420 Gm Powder 420 Gm PO DAILY


Multi-Vitamin Daily (Multivitamin) 1 Each Tablet 1 Each PO 


Famotidine 20 Mg Tablet 20 Mg PO HS


Asacol Hd (Mesalamine) 800 Mg Tablet.dr 1,600 Mg PO TID


Vitals/I & O





Vital Sign - Last 24 Hours








 12/23/19 12/23/19 12/23/19 12/23/19





 12:00 12:16 15:08 19:00


 


Temp 97.8  97.9 98.9





 97.8  97.9 98.9


 


Pulse 91  82 82


 


Resp 18  18 18


 


B/P (MAP) 136/86 (103)  113/69 (84) 119/66 (83)


 


Pulse Ox 97  95 96


 


O2 Delivery Room Air Room Air Room Air Room Air


 


    





    





 12/23/19 12/23/19 12/24/19 





 20:00 23:00 03:00 


 


Temp  98.8 98.3 





  98.8 98.3 


 


Pulse  59 74 


 


Resp  18 18 


 


B/P (MAP)  113/65 (81) 122/71 (88) 


 


Pulse Ox  97 96 


 


O2 Delivery Room Air Room Air Room Air 














Intake and Output   


 


 12/23/19 12/23/19 12/24/19





 15:00 23:00 07:00


 


Intake Total 300 ml 300 ml 


 


Balance 300 ml 300 ml 

















ADALBERTO FORD MD        Dec 24, 2019 07:22

## 2019-12-24 NOTE — PDOC
Infectious Disease Note


Vital Sign


Vital Signs





Vital Signs








  Date Time  Temp Pulse Resp B/P (MAP) Pulse Ox O2 Delivery O2 Flow Rate FiO2


 


12/24/19 07:00 98.4 78 16 101/70 (80) 94 Room Air  





 98.4       











Labs


Lab





Laboratory Tests








Test


 12/24/19


04:35


 


White Blood Count


 5.8 x10^3/uL


(4.0-11.0)


 


Red Blood Count


 4.37 x10^6/uL


(4.30-5.70)


 


Hemoglobin


 13.2 g/dL


(13.0-17.5)


 


Hematocrit


 39.5 %


(39.0-53.0)


 


Mean Corpuscular Volume 90 fL () 


 


Mean Corpuscular Hemoglobin 30 pg (25-35) 


 


Mean Corpuscular Hemoglobin


Concent 34 g/dL


(31-37)


 


Red Cell Distribution Width


 13.5 %


(11.5-14.5)


 


Platelet Count


 163 x10^3/uL


(140-400)


 


Neutrophils (%) (Auto) 71 % (31-73) 


 


Lymphocytes (%) (Auto) 14 % (24-48) 


 


Monocytes (%) (Auto) 13 % (0-9) 


 


Eosinophils (%) (Auto) 3 % (0-3) 


 


Basophils (%) (Auto) 0 % (0-3) 


 


Neutrophils # (Auto)


 4.1 x10^3/uL


(1.8-7.7)


 


Lymphocytes # (Auto)


 0.8 x10^3/uL


(1.0-4.8)


 


Monocytes # (Auto)


 0.7 x10^3/uL


(0.0-1.1)


 


Eosinophils # (Auto)


 0.2 x10^3/uL


(0.0-0.7)


 


Basophils # (Auto)


 0.0 x10^3/uL


(0.0-0.2)


 


Erythrocyte Sedimentation Rate 35 (0-15) 


 


Sodium Level


 141 mmol/L


(136-145)


 


Potassium Level


 4.2 mmol/L


(3.5-5.1)


 


Chloride Level


 106 mmol/L


()


 


Carbon Dioxide Level


 27 mmol/L


(21-32)


 


Anion Gap 8 (6-14) 


 


Blood Urea Nitrogen


 11 mg/dL


(8-26)


 


Creatinine


 1.1 mg/dL


(0.7-1.3)


 


Estimated GFR


(Cockcroft-Gault) 68.3 





 


Glucose Level


 114 mg/dL


(70-99)


 


Calcium Level


 9.0 mg/dL


(8.5-10.1)


 


C-Reactive Protein,


Quantitative 42.8 mg/L


(0-3.3)


 


Procalcitonin


 < 0.10 ng/mL


(0.00-0.10)











Objective


Assessment


Multiple sacrococcygeal fistulas, bleeding, no overt signs of infection 


Crohn's disease with h/o ostomy and multiple fistula repairs 


GERD


Obesity





Plan


Plan of Care


D/c clindamycin 


CT abd/pelvis pending 


No surgical plans at this time 





Thank you


 [id number]


Attending Co-Sign


The patient was seen and interviewed as well as examined at the bedside. The 

chart was reviewed. The case was discussed. Agree with the plan of care.


F/U CT abdomen and pelvis results











CASEY CHE        Dec 24, 2019 09:30


ALEXANDER VAIL MD           Dec 24, 2019 11:00

## 2019-12-24 NOTE — PDOC2
DIVINE DIALLO NEHEMIAS APRN 12/24/19 0958:


CONSULT


Date of Consult


Date of Consult


DATE: 12/24/19 


TIME: 09:54





Reason for Consult


Reason for Consult:


crohns, fistula





Referring Physician


Referring Physician:


Dr Marmolejo





Identification/Chief Complaint


Chief Complaint


rectal bleeding





Source


Source:  Chart review, Patient





History of Present Illness


Reason for Visit:


Chronic wounds, follows in wound clinic, Dr Davalos seen in clinic last week, in 

2018 Dr Davalos did an excisional debridement of coccyx for abscess


He follows in wound care


With rectal bleeding





Past Medical History


GI:  GERD, Other


Psych:  Depression


Endocrine:  Other





Past Surgical History


Past Surgical History:  Colon Resection, Other





Social History


ALCOHOL:  none


Lives:  Alone


Domestic Violence:  Neg





Current Medications


Current Medications





Current Medications


Acetaminophen (Tylenol) 650 mg PRN Q4HRS  PRN PO PAIN;  Start 12/23/19 at 14:00


Ergocalciferol (Vitamin D2) 50,000 unit QMONTH PO ;  Start 1/22/20 at 09:00


Famotidine (Pepcid) 20 mg HS PO  Last administered on 12/23/19at 21:20;  Start 

12/23/19 at 21:00


Guaifenesin (Robitussin Dm) 5 ml PRN QID  PRN PO COUGH;  Start 12/23/19 at 14:00


Ascorbic Acid (Vitamin C) 500 mg BID PO  Last administered on 12/24/19at 08:39; 

Start 12/23/19 at 21:00


Clindamycin HCl (Cleocin) 300 mg TID PO  Last administered on 12/24/19at 08:39; 

Start 12/23/19 at 14:00;  Stop 12/24/19 at 09:30;  Status DC


Magnesium Hydroxide (Milk Of Magnesia) 2,400 mg PRN DAILY  PRN PO CONSTIPATION; 

Start 12/23/19 at 14:00


Mesalamine (Delzicol) 800 mg TID PO  Last administered on 12/24/19at 08:39;  

Start 12/23/19 at 15:00


Non-Formulary Medication (Protein Supplement (Nutritional Drink Mix)) 420 gm 

DAILY PO ;  Start 12/24/19 at 09:00;  Status UNV


Iohexol (Omnipaque 300 Mg/ml) 75 ml 1X  ONCE IV  Last administered on 12/24/19at

08:29;  Start 12/24/19 at 08:00;  Stop 12/24/19 at 08:01;  Status DC





Active Scripts


Active


Reported


Hydrocodone-Apap 5-325  ** (Hydrocodone Bit/Acetaminophen) 1 Tab Tablet 1-2 Tab 

PO PRN Q4HRS PRN


Tussin Dm Cough & Chest Syrup (Guaifenesin/Dextromethorphan) 118 Ml Syrup 5 Ml 

PO PRN QID PRN 12 Days


Milk Of Magnesia (Magnesium Hydroxide) 2,400 Mg/10 Ml Oral.susp 2,400 Mg PO PRN 

DAILY PRN


Tylenol (Acetaminophen) 325 Mg Tablet 650 Mg PO PRN Q4HRS PRN


Clindamycin Hcl 300 Mg Capsule 300 Mg PO TID


Vitamin D2 (Ergocalciferol (Vitamin D2)) 50,000 Unit Capsule 1 Cap PO QMONTH


Vitamin C (Ascorbate Calcium) 500 Mg Tablet 500 Mg PO BID


Nutritional Drink Mix (Protein Supplement) 420 Gm Powder 420 Gm PO DAILY


Multi-Vitamin Daily (Multivitamin) 1 Each Tablet 1 Each PO 


Famotidine 20 Mg Tablet 20 Mg PO HS


Asacol Hd (Mesalamine) 800 Mg Tablet.dr 1,600 Mg PO TID





Allergies


Allergies:  


Coded Allergies:  


     No Known Drug Allergies (Unverified , 9/11/18)





ROS


General:  No: Chills, Other (fevers )


PSYCHOLOGICAL ROS:  No: Anxiety, Depression


Eyes:  No Blurry vision, No Double vision


HEENT:  No: Heacaches, Sore Throat


Hematological and Lymphatic:  YES: Bleeding Problems; 


   No: Blood Clots


Respiratory:  No: Cough, Shortness of breath


Cardiovascular:  No Chest Pain, No Palpitations


Gastrointestinal:  No Nausea, No Vomiting


Genitourinary:  No Dysuria, No Hematuria


Musculoskeletal:  No Joint Pain, No Muscle Pain


Neurological:  No Confusion, No Impaired Coord/balance


Skin:  Yes Other (see hpi)





Physical Exam


General:  Alert, Oriented X3, Cooperative


HEENT:  Atraumatic, PERRLA


Lungs:  Clear to auscultation, Normal air movement


Heart:  Regular rate, Normal S1, Normal S2


Abdomen:  Soft, No tenderness


Extremities:  No clubbing, No cyanosis


Skin:  Other (wounds to coccyx, drainage, bleeding on dressings, packing in 

place)


Neuro:  Normal gait, Normal speech


Psych/Mental Status:  Mental status NL, Mood NL





Vitals


VITALS





Vital Signs








  Date Time  Temp Pulse Resp B/P (MAP) Pulse Ox O2 Delivery O2 Flow Rate FiO2


 


12/24/19 07:00 98.4 78 16 101/70 (80) 94 Room Air  





 98.4       











Labs


Labs





Laboratory Tests








Test


 12/24/19


04:35


 


White Blood Count


 5.8 x10^3/uL


(4.0-11.0)


 


Red Blood Count


 4.37 x10^6/uL


(4.30-5.70)


 


Hemoglobin


 13.2 g/dL


(13.0-17.5)


 


Hematocrit


 39.5 %


(39.0-53.0)


 


Mean Corpuscular Volume 90 fL () 


 


Mean Corpuscular Hemoglobin 30 pg (25-35) 


 


Mean Corpuscular Hemoglobin


Concent 34 g/dL


(31-37)


 


Red Cell Distribution Width


 13.5 %


(11.5-14.5)


 


Platelet Count


 163 x10^3/uL


(140-400)


 


Neutrophils (%) (Auto) 71 % (31-73) 


 


Lymphocytes (%) (Auto) 14 % (24-48) 


 


Monocytes (%) (Auto) 13 % (0-9) 


 


Eosinophils (%) (Auto) 3 % (0-3) 


 


Basophils (%) (Auto) 0 % (0-3) 


 


Neutrophils # (Auto)


 4.1 x10^3/uL


(1.8-7.7)


 


Lymphocytes # (Auto)


 0.8 x10^3/uL


(1.0-4.8)


 


Monocytes # (Auto)


 0.7 x10^3/uL


(0.0-1.1)


 


Eosinophils # (Auto)


 0.2 x10^3/uL


(0.0-0.7)


 


Basophils # (Auto)


 0.0 x10^3/uL


(0.0-0.2)


 


Erythrocyte Sedimentation Rate 35 (0-15) 


 


Sodium Level


 141 mmol/L


(136-145)


 


Potassium Level


 4.2 mmol/L


(3.5-5.1)


 


Chloride Level


 106 mmol/L


()


 


Carbon Dioxide Level


 27 mmol/L


(21-32)


 


Anion Gap 8 (6-14) 


 


Blood Urea Nitrogen


 11 mg/dL


(8-26)


 


Creatinine


 1.1 mg/dL


(0.7-1.3)


 


Estimated GFR


(Cockcroft-Gault) 68.3 





 


Glucose Level


 114 mg/dL


(70-99)


 


Calcium Level


 9.0 mg/dL


(8.5-10.1)


 


C-Reactive Protein,


Quantitative 42.8 mg/L


(0-3.3)


 


Procalcitonin


 < 0.10 ng/mL


(0.00-0.10)








Laboratory Tests








Test


 12/24/19


04:35


 


White Blood Count


 5.8 x10^3/uL


(4.0-11.0)


 


Red Blood Count


 4.37 x10^6/uL


(4.30-5.70)


 


Hemoglobin


 13.2 g/dL


(13.0-17.5)


 


Hematocrit


 39.5 %


(39.0-53.0)


 


Mean Corpuscular Volume 90 fL () 


 


Mean Corpuscular Hemoglobin 30 pg (25-35) 


 


Mean Corpuscular Hemoglobin


Concent 34 g/dL


(31-37)


 


Red Cell Distribution Width


 13.5 %


(11.5-14.5)


 


Platelet Count


 163 x10^3/uL


(140-400)


 


Neutrophils (%) (Auto) 71 % (31-73) 


 


Lymphocytes (%) (Auto) 14 % (24-48) 


 


Monocytes (%) (Auto) 13 % (0-9) 


 


Eosinophils (%) (Auto) 3 % (0-3) 


 


Basophils (%) (Auto) 0 % (0-3) 


 


Neutrophils # (Auto)


 4.1 x10^3/uL


(1.8-7.7)


 


Lymphocytes # (Auto)


 0.8 x10^3/uL


(1.0-4.8)


 


Monocytes # (Auto)


 0.7 x10^3/uL


(0.0-1.1)


 


Eosinophils # (Auto)


 0.2 x10^3/uL


(0.0-0.7)


 


Basophils # (Auto)


 0.0 x10^3/uL


(0.0-0.2)


 


Erythrocyte Sedimentation Rate 35 (0-15) 


 


Sodium Level


 141 mmol/L


(136-145)


 


Potassium Level


 4.2 mmol/L


(3.5-5.1)


 


Chloride Level


 106 mmol/L


()


 


Carbon Dioxide Level


 27 mmol/L


(21-32)


 


Anion Gap 8 (6-14) 


 


Blood Urea Nitrogen


 11 mg/dL


(8-26)


 


Creatinine


 1.1 mg/dL


(0.7-1.3)


 


Estimated GFR


(Cockcroft-Gault) 68.3 





 


Glucose Level


 114 mg/dL


(70-99)


 


Calcium Level


 9.0 mg/dL


(8.5-10.1)


 


C-Reactive Protein,


Quantitative 42.8 mg/L


(0-3.3)


 


Procalcitonin


 < 0.10 ng/mL


(0.00-0.10)











Assessment/Plan


Assessment/Plan


rectal fistula/crohns 


awaiting ct





PORTILLO ODONNELL MD 12/24/19 1126:


CONSULT


Assessment/Plan


Assessment/Plan


CT reviewed 


d/w Zachary


findings consistent with hx of debridement of coccyx/sacrum previously





no acute surg recs





Dr Mckeon available tomorrow if needed


Dr Davalos returns Thursday





Thanks for consult











DIVINE DIALLO            Dec 24, 2019 09:58


PORTILLO ODONNELL MD                Dec 24, 2019 11:26

## 2019-12-24 NOTE — CONS
DATE OF CONSULTATION:  12/24/2019



REFERRING PHYSICIAN:  Dr. Marmolejo.



REASON FOR CONSULTATION:  Antibiotic management.



HISTORY OF PRESENT ILLNESS:  This patient is a 59-year-old  male with a

history of severe Crohn's disease, status post ostomy and multiple fistula

repairs.  [facility] from a coccyx

wound.  He was evaluated by General Surgery with no surgical plans at this time.

 A CT abdomen/pelvis scan is pending.  Anaerobic-aerobic culture with Gram stain

was obtained.  He is currently on clindamycin.



The patient says he has had wounds off and on for years.  Over the last 6 months

or so, he has had several rounds of antibiotics for odor.  A previous culture in

November grew mixed site claudia.  The patient denies fevers, chills, sweats or

body aches.  Denies nausea, vomiting or increased ostomy output.  Denies rash or

itching.



PAST MEDICAL HISTORY:  Severe Crohn's disease with multiple fistulas, GERD,

hyperlipidemia.



PAST SURGICAL HISTORY:  Multiple abdominal surgeries, ostomy, multiple fistula

repairs, GERD, depression, cataract extractions.



FAMILY HISTORY:  Distant cousin with Crohn's.



SOCIAL HISTORY:  The patient lives in assisted living facility.  He smokes

cigars occasionally.  No pets.



ALLERGIES:  No known drug allergies.



MEDICATIONS:  Clindamycin, mesalamine, guaifenesin, Pepcid, vitamin D2, vitamin

C, Tylenol, milk of magnesia.



REVIEW OF SYSTEMS:  Per HPI, otherwise all other review of systems is negative.



PHYSICAL EXAMINATION:

VITAL SIGNS:  Temperature is 98.4, blood pressure 101/70, heart rate 78,

respiratory rate 16, pulse oximetry 94% on room air.

GENERAL:  The patient is propped up in bed, alert, in no apparent distress.

HEENT:  Pupils equally round.  Oropharynx pink and moist.

NECK:  Supple, no lymphadenopathy.

LUNGS:  Clear to auscultation.

HEART:  S1, S2.

ABDOMEN:  Obese, soft, nontender with bowel sounds present.  Multiple scars and

right-sided ostomy.

EXTREMITIES:  No gross edema or cyanosis.

SKIN:  Warm to touch without signs of rash.  He has several sacrococcygeal

fistulas bleeding and packed with Nu Gauze.  No overt signs of infection.

NEUROLOGIC:  Alert.  Answering questions appropriately.  



LABORATORY DATA:  Today's WBC 5.8, hemoglobin 13.2, platelets 163,000.  Sed rate

35. Electrolytes are unremarkable.  Creatinine 1.1, BUN 11, glucose 114.  CRP

42.8.  Procalcitonin less than 0.10.  CT abdomen/pelvis pending. 

Anaerobic-aerobic culture with Gram stain pending.



IMPRESSION:

1.  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

2.  Crohn's disease with history of ostomy and multiple fistula repairs.

3.  Gastroesophageal reflux disease.

4.  Obesity.



PLAN:  

1.  Discontinue the clindamycin.

2.  CT abdomen/pelvis scan pending.

3. The patient was evaluated by Dr. Munson with no surgical plans at this time. 


4. Local wound care.



Thank you, Dr. Marmolejo, for asking us to participate in this patient's care. 

Should you have further questions or concerns, please call.

 



______________________________

ALEXANDER VAIL MD



DR:  RHONDA/jeanne  JOB#:  [id number] / [id number]

DD:  12/24/2019 09:42  DT:  12/24/2019 10:34

KAM

## 2019-12-24 NOTE — RAD
EXAM: CT ABDOMEN/PELVIS WITH CONTRAST.

 

HISTORY: Sacral wound second fistulous.

 

TECHNIQUE: Computed tomography of the abdomen and pelvis was performed 

after the intravenous administration of iodinated contrast.

 

COMPARISON: 08/09/2018, 09/11/2018.

 

FINDINGS: Lung windows through the visualized portions of the bases reveal

mild atelectasis. There is a calcified granuloma in the left lower lobe. 

 

Soft tissue defects along the distal sacrum and midline buttocks are 

consistent with ulcers. Granulation tissue lined tracts extend deep into 

the right ischiorectal fat, and to the former location of the coccyx. The 

coccyx and most distal aspect of the sacrum have been completely eroded 

consistent with acute osteomyelitis. There is chronic osteomyelitis within

the remaining distal sacrum. The cavity at the former site of coccyx 

measures 3.7 x 3.0 cm. Additional granulation tissue and stranding extends

into the presacral space. There is no drainable collection at this site.

 

The liver, gallbladder, pancreatic and adrenal glands are unremarkable. 

Foci of mild hypoattenuation within the spleen measure up to 13 mm. These 

are likely benign lesions such as hemangiomas. Right renal calculi measure

up to 5 mm. The left kidney is unremarkable. There are no pathologically 

enlarged lymph nodes.

 

The colon appears to be surgically absent. There is a right lower quadrant

ileostomy. A large right lower quadrant parastomal hernia contains 

multiple loops of nonobstructed small bowel. Another moderate to large 

left sided hernia is likely at the site of an old ostomy and contains only

fat. There is no small bowel obstruction.

 

The prostate is enlarged measuring 4.3 x 3.8 cm. The bladder is partially 

decompressed but demonstrates diffuse wall thickening.

 

IMPRESSION: 

1. Multiple deep ulcers along the distal sacrum and buttocks extend into 

the right ischiorectal fat and to the former site of the coccyx. The 

coccyx and distal sacrum have been completely eroded. There is some debris

within the tracts, but no drainable collection.

2. Large parastomal hernia on the right containing multiple nonobstructed 

small bowel loops

3. A moderate hernia at a prior stoma site on the left contains only fat.

4. Right renal calculi measure up to 5 mm.

5. Diffuse bladder wall thickening indicates chronic outlet obstruction or

inflammation.

 

 

*One or more of the following individualized dose reduction techniques 

were utilized for this examination:  

1. Automated exposure control.  

2. Adjustment of the mA and/or kV according to patient size.  

3. Use of iterative reconstruction technique.

 

 

Electronically signed by: KT Hopson MD (12/24/2019 10:30 AM) 

[facility]

## 2019-12-25 VITALS — SYSTOLIC BLOOD PRESSURE: 116 MMHG | DIASTOLIC BLOOD PRESSURE: 57 MMHG

## 2019-12-25 VITALS — DIASTOLIC BLOOD PRESSURE: 61 MMHG | SYSTOLIC BLOOD PRESSURE: 116 MMHG

## 2019-12-25 VITALS — SYSTOLIC BLOOD PRESSURE: 118 MMHG | DIASTOLIC BLOOD PRESSURE: 62 MMHG

## 2019-12-25 VITALS — DIASTOLIC BLOOD PRESSURE: 68 MMHG | SYSTOLIC BLOOD PRESSURE: 113 MMHG

## 2019-12-25 VITALS — SYSTOLIC BLOOD PRESSURE: 123 MMHG | DIASTOLIC BLOOD PRESSURE: 57 MMHG

## 2019-12-25 VITALS — DIASTOLIC BLOOD PRESSURE: 63 MMHG | SYSTOLIC BLOOD PRESSURE: 118 MMHG

## 2019-12-25 LAB
ANION GAP SERPL CALC-SCNC: 9 MMOL/L (ref 6–14)
BASOPHILS # BLD AUTO: 0.1 X10^3/UL (ref 0–0.2)
BASOPHILS NFR BLD: 1 % (ref 0–3)
BUN SERPL-MCNC: 13 MG/DL (ref 8–26)
CALCIUM SERPL-MCNC: 9.1 MG/DL (ref 8.5–10.1)
CHLORIDE SERPL-SCNC: 106 MMOL/L (ref 98–107)
CO2 SERPL-SCNC: 27 MMOL/L (ref 21–32)
CREAT SERPL-MCNC: 1 MG/DL (ref 0.7–1.3)
EOSINOPHIL NFR BLD: 0.2 X10^3/UL (ref 0–0.7)
EOSINOPHIL NFR BLD: 4 % (ref 0–3)
ERYTHROCYTE [DISTWIDTH] IN BLOOD BY AUTOMATED COUNT: 13.6 % (ref 11.5–14.5)
GFR SERPLBLD BASED ON 1.73 SQ M-ARVRAT: 76.2 ML/MIN
GLUCOSE SERPL-MCNC: 110 MG/DL (ref 70–99)
HCT VFR BLD CALC: 38.7 % (ref 39–53)
HGB BLD-MCNC: 13.1 G/DL (ref 13–17.5)
LYMPHOCYTES # BLD: 1 X10^3/UL (ref 1–4.8)
LYMPHOCYTES NFR BLD AUTO: 18 % (ref 24–48)
MCH RBC QN AUTO: 31 PG (ref 25–35)
MCHC RBC AUTO-ENTMCNC: 34 G/DL (ref 31–37)
MCV RBC AUTO: 90 FL (ref 79–100)
MONO #: 0.8 X10^3/UL (ref 0–1.1)
MONOCYTES NFR BLD: 14 % (ref 0–9)
NEUT #: 3.4 X10^3/UL (ref 1.8–7.7)
NEUTROPHILS NFR BLD AUTO: 63 % (ref 31–73)
PLATELET # BLD AUTO: 164 X10^3/UL (ref 140–400)
POTASSIUM SERPL-SCNC: 3.8 MMOL/L (ref 3.5–5.1)
RBC # BLD AUTO: 4.29 X10^6/UL (ref 4.3–5.7)
SODIUM SERPL-SCNC: 142 MMOL/L (ref 136–145)
WBC # BLD AUTO: 5.5 X10^3/UL (ref 4–11)

## 2019-12-25 RX ADMIN — MESALAMINE SCH MG: 400 CAPSULE, DELAYED RELEASE ORAL at 13:10

## 2019-12-25 RX ADMIN — OXYCODONE HYDROCHLORIDE AND ACETAMINOPHEN SCH MG: 500 TABLET ORAL at 20:49

## 2019-12-25 RX ADMIN — OXYCODONE HYDROCHLORIDE AND ACETAMINOPHEN PRN TAB: 5; 325 TABLET ORAL at 15:32

## 2019-12-25 RX ADMIN — FAMOTIDINE SCH MG: 20 TABLET ORAL at 20:49

## 2019-12-25 RX ADMIN — OXYCODONE HYDROCHLORIDE AND ACETAMINOPHEN SCH MG: 500 TABLET ORAL at 08:36

## 2019-12-25 RX ADMIN — MESALAMINE SCH MG: 400 CAPSULE, DELAYED RELEASE ORAL at 08:36

## 2019-12-25 RX ADMIN — MESALAMINE SCH MG: 400 CAPSULE, DELAYED RELEASE ORAL at 20:49

## 2019-12-25 NOTE — PDOC
Infectious Disease Note


Subjective:


Subjective


pt doing ok





Vital Signs:


Vital Signs





Vital Signs








  Date Time  Temp Pulse Resp B/P (MAP) Pulse Ox O2 Delivery O2 Flow Rate FiO2


 


12/25/19 08:00      Room Air  


 


12/25/19 07:00 97.5 62 18 113/68 (83) 95   





 97.5       











Physical Exam:


PHYSICAL EXAM


GENERAL:  The patient is propped up in bed, alert, in no apparent distress.


HEENT:  Pupils equally round.  Oropharynx pink and moist.


NECK:  Supple, no lymphadenopathy.


LUNGS:  Clear to auscultation.


HEART:  S1, S2.


ABDOMEN:  Obese, soft, nontender with bowel sounds present.  Multiple scars and


right-sided ostomy.


EXTREMITIES:  No gross edema or cyanosis.


SKIN:  Warm to touch without signs of rash.  He has several sacrococcygeal


fistulas bleeding and packed with Nu Gauze.  No overt signs of infection.


NEUROLOGIC:  Alert.  Answering questions appropriately.





Medications:


Inpatient Meds:





Current Medications








 Medications


  (Trade)  Dose


 Ordered  Sig/Lisa  Start Time


 Stop Time Status Last Admin


Dose Admin


 


 Acetaminophen


  (Tylenol)  500 mg  PRN Q6HRS  PRN  12/25/19 08:30


     





 


 Acetaminophen/


 Codeine Phosphate


  (Tylenol #3)  1 tab  PRN Q6HRS  PRN  12/25/19 08:30


     





 


 Ascorbic Acid


  (Vitamin C)  500 mg  BID  12/23/19 21:00


    12/25/19 08:36


500 MG


 


 Clindamycin HCl


  (Cleocin)  300 mg  TID  12/23/19 14:00


 12/24/19 09:30 DC 12/24/19 08:39


300 MG


 


 Ergocalciferol


  (Vitamin D2)  50,000 unit  QMONTH  1/22/20 09:00


     





 


 Famotidine


  (Pepcid)  20 mg  HS  12/23/19 21:00


    12/24/19 21:01


20 MG


 


 Guaifenesin


  (Robitussin Dm)  5 ml  PRN QID  PRN  12/23/19 14:00


     





 


 Iohexol


  (Omnipaque 300


 Mg/ml)  75 ml  1X  ONCE  12/24/19 08:00


 12/24/19 08:01 DC 12/24/19 08:29


75 ML


 


 Magnesium


 Hydroxide


  (Milk Of


 Magnesia)  2,400 mg  PRN DAILY  PRN  12/23/19 14:00


     





 


 Mesalamine


  (Delzicol)  800 mg  TID  12/23/19 15:00


    12/25/19 08:36


800 MG


 


 Morphine Sulfate


  (Morphine


 Sulfate)  2 mg  PRN Q2HR  PRN  12/25/19 08:30


     





 


 Non-Formulary


 Medication


  (Protein


 Supplement


  (Nutritional


 Drink Mix))  420 gm  DAILY  12/24/19 09:00


   UNV  





 


 Oxycodone/


 Acetaminophen


  (Percocet 5/325)  1 tab  PRN Q4HRS  PRN  12/25/19 08:30


     














Labs:


Lab





Laboratory Tests








Test


 12/25/19


05:00


 


White Blood Count


 5.5 x10^3/uL


(4.0-11.0)


 


Red Blood Count


 4.29 x10^6/uL


(4.30-5.70)


 


Hemoglobin


 13.1 g/dL


(13.0-17.5)


 


Hematocrit


 38.7 %


(39.0-53.0)


 


Mean Corpuscular Volume 90 fL () 


 


Mean Corpuscular Hemoglobin 31 pg (25-35) 


 


Mean Corpuscular Hemoglobin


Concent 34 g/dL


(31-37)


 


Red Cell Distribution Width


 13.6 %


(11.5-14.5)


 


Platelet Count


 164 x10^3/uL


(140-400)


 


Neutrophils (%) (Auto) 63 % (31-73) 


 


Lymphocytes (%) (Auto) 18 % (24-48) 


 


Monocytes (%) (Auto) 14 % (0-9) 


 


Eosinophils (%) (Auto) 4 % (0-3) 


 


Basophils (%) (Auto) 1 % (0-3) 


 


Neutrophils # (Auto)


 3.4 x10^3/uL


(1.8-7.7)


 


Lymphocytes # (Auto)


 1.0 x10^3/uL


(1.0-4.8)


 


Monocytes # (Auto)


 0.8 x10^3/uL


(0.0-1.1)


 


Eosinophils # (Auto)


 0.2 x10^3/uL


(0.0-0.7)


 


Basophils # (Auto)


 0.1 x10^3/uL


(0.0-0.2)


 


Sodium Level


 142 mmol/L


(136-145)


 


Potassium Level


 3.8 mmol/L


(3.5-5.1)


 


Chloride Level


 106 mmol/L


()


 


Carbon Dioxide Level


 27 mmol/L


(21-32)


 


Anion Gap 9 (6-14) 


 


Blood Urea Nitrogen


 13 mg/dL


(8-26)


 


Creatinine


 1.0 mg/dL


(0.7-1.3)


 


Estimated GFR


(Cockcroft-Gault) 76.2 





 


Glucose Level


 110 mg/dL


(70-99)


 


Calcium Level


 9.1 mg/dL


(8.5-10.1)











Objective:


Assessment:


 


1.  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


2.  Crohn's disease with history of ostomy and multiple fistula repairs.


3.  Gastroesophageal reflux disease.


4.  Obesity.


5.  Kidney stone on CT Abdomen





Plan:


Plan of Care


 monitor off antibiotics


encourage fluid intake


local wound care as directed











ALEXANDER VAIL MD           Dec 25, 2019 10:12

## 2019-12-25 NOTE — PDOC
PROGRESS NOTES


Chief Complaint


Chief Complaint


Perianal wound - with bleeding NON SURGICAL /MED mx


Severe Crohn's with multiple surgeries s/p ostomy (multiple fissures and 

abdominal wounds)


Chronic pain


Hyperlipidemia


Hypertension


GERD


Incidenatl 5 mm rt kidney stone


HIatal hernia with small bowel loops - known to him





History of Present Illness


History of Present Illness


RECtal wounds inspected, erythema round but no induration, 3 deep thin 

drains/gauze in place


NO fevers


ID on board


Ambulatory,. non toxic appearing


HX crohns for yrs now


[place]ives in [place] and has HH wound care


Incidental 5 mm RT kidney stone, no pains





PLAN:


CPM


NO surgical plans so far


Dw him and left his CT results





Vitals


Vitals





Vital Signs








  Date Time  Temp Pulse Resp B/P (MAP) Pulse Ox O2 Delivery O2 Flow Rate FiO2


 


12/25/19 08:00      Room Air  


 


12/25/19 07:00 97.5 62 18 113/68 (83) 95   





 97.5       











Physical Exam


General:  Alert, Oriented X3, Cooperative, mild distress


Heart:  Regular rate, Normal S1, Normal S2


Lungs:  Clear


Abdomen:  Soft, No tenderness


Extremities:  No clubbing, No cyanosis


Skin:  Other (wounds to coccyx, drainage, bleeding on dressings, packing in 

place)





Labs


LABS





Laboratory Tests








Test


 12/25/19


05:00


 


White Blood Count


 5.5 x10^3/uL


(4.0-11.0)


 


Red Blood Count


 4.29 x10^6/uL


(4.30-5.70)


 


Hemoglobin


 13.1 g/dL


(13.0-17.5)


 


Hematocrit


 38.7 %


(39.0-53.0)


 


Mean Corpuscular Volume 90 fL () 


 


Mean Corpuscular Hemoglobin 31 pg (25-35) 


 


Mean Corpuscular Hemoglobin


Concent 34 g/dL


(31-37)


 


Red Cell Distribution Width


 13.6 %


(11.5-14.5)


 


Platelet Count


 164 x10^3/uL


(140-400)


 


Neutrophils (%) (Auto) 63 % (31-73) 


 


Lymphocytes (%) (Auto) 18 % (24-48) 


 


Monocytes (%) (Auto) 14 % (0-9) 


 


Eosinophils (%) (Auto) 4 % (0-3) 


 


Basophils (%) (Auto) 1 % (0-3) 


 


Neutrophils # (Auto)


 3.4 x10^3/uL


(1.8-7.7)


 


Lymphocytes # (Auto)


 1.0 x10^3/uL


(1.0-4.8)


 


Monocytes # (Auto)


 0.8 x10^3/uL


(0.0-1.1)


 


Eosinophils # (Auto)


 0.2 x10^3/uL


(0.0-0.7)


 


Basophils # (Auto)


 0.1 x10^3/uL


(0.0-0.2)


 


Sodium Level


 142 mmol/L


(136-145)


 


Potassium Level


 3.8 mmol/L


(3.5-5.1)


 


Chloride Level


 106 mmol/L


()


 


Carbon Dioxide Level


 27 mmol/L


(21-32)


 


Anion Gap 9 (6-14) 


 


Blood Urea Nitrogen


 13 mg/dL


(8-26)


 


Creatinine


 1.0 mg/dL


(0.7-1.3)


 


Estimated GFR


(Cockcroft-Gault) 76.2 





 


Glucose Level


 110 mg/dL


(70-99)


 


Calcium Level


 9.1 mg/dL


(8.5-10.1)











Review of Systems


Review of Systems


neg 14 pt system reviewed with him





Comment


Review of Relevant


I have reviewed the following items crow (where applicable) has been applied.


Labs





Laboratory Tests








Test


 12/24/19


04:35 12/25/19


05:00


 


White Blood Count


 5.8 x10^3/uL


(4.0-11.0) 5.5 x10^3/uL


(4.0-11.0)


 


Red Blood Count


 4.37 x10^6/uL


(4.30-5.70) 4.29 x10^6/uL


(4.30-5.70)


 


Hemoglobin


 13.2 g/dL


(13.0-17.5) 13.1 g/dL


(13.0-17.5)


 


Hematocrit


 39.5 %


(39.0-53.0) 38.7 %


(39.0-53.0)


 


Mean Corpuscular Volume 90 fL ()  90 fL () 


 


Mean Corpuscular Hemoglobin 30 pg (25-35)  31 pg (25-35) 


 


Mean Corpuscular Hemoglobin


Concent 34 g/dL


(31-37) 34 g/dL


(31-37)


 


Red Cell Distribution Width


 13.5 %


(11.5-14.5) 13.6 %


(11.5-14.5)


 


Platelet Count


 163 x10^3/uL


(140-400) 164 x10^3/uL


(140-400)


 


Neutrophils (%) (Auto) 71 % (31-73)  63 % (31-73) 


 


Lymphocytes (%) (Auto) 14 % (24-48)  18 % (24-48) 


 


Monocytes (%) (Auto) 13 % (0-9)  14 % (0-9) 


 


Eosinophils (%) (Auto) 3 % (0-3)  4 % (0-3) 


 


Basophils (%) (Auto) 0 % (0-3)  1 % (0-3) 


 


Neutrophils # (Auto)


 4.1 x10^3/uL


(1.8-7.7) 3.4 x10^3/uL


(1.8-7.7)


 


Lymphocytes # (Auto)


 0.8 x10^3/uL


(1.0-4.8) 1.0 x10^3/uL


(1.0-4.8)


 


Monocytes # (Auto)


 0.7 x10^3/uL


(0.0-1.1) 0.8 x10^3/uL


(0.0-1.1)


 


Eosinophils # (Auto)


 0.2 x10^3/uL


(0.0-0.7) 0.2 x10^3/uL


(0.0-0.7)


 


Basophils # (Auto)


 0.0 x10^3/uL


(0.0-0.2) 0.1 x10^3/uL


(0.0-0.2)


 


Erythrocyte Sedimentation Rate 35 (0-15)  


 


Sodium Level


 141 mmol/L


(136-145) 142 mmol/L


(136-145)


 


Potassium Level


 4.2 mmol/L


(3.5-5.1) 3.8 mmol/L


(3.5-5.1)


 


Chloride Level


 106 mmol/L


() 106 mmol/L


()


 


Carbon Dioxide Level


 27 mmol/L


(21-32) 27 mmol/L


(21-32)


 


Anion Gap 8 (6-14)  9 (6-14) 


 


Blood Urea Nitrogen


 11 mg/dL


(8-26) 13 mg/dL


(8-26)


 


Creatinine


 1.1 mg/dL


(0.7-1.3) 1.0 mg/dL


(0.7-1.3)


 


Estimated GFR


(Cockcroft-Gault) 68.3 


 76.2 





 


Glucose Level


 114 mg/dL


(70-99) 110 mg/dL


(70-99)


 


Calcium Level


 9.0 mg/dL


(8.5-10.1) 9.1 mg/dL


(8.5-10.1)


 


C-Reactive Protein,


Quantitative 42.8 mg/L


(0-3.3) 





 


Procalcitonin


 < 0.10 ng/mL


(0.00-0.10) 











Laboratory Tests








Test


 12/25/19


05:00


 


White Blood Count


 5.5 x10^3/uL


(4.0-11.0)


 


Red Blood Count


 4.29 x10^6/uL


(4.30-5.70)


 


Hemoglobin


 13.1 g/dL


(13.0-17.5)


 


Hematocrit


 38.7 %


(39.0-53.0)


 


Mean Corpuscular Volume 90 fL () 


 


Mean Corpuscular Hemoglobin 31 pg (25-35) 


 


Mean Corpuscular Hemoglobin


Concent 34 g/dL


(31-37)


 


Red Cell Distribution Width


 13.6 %


(11.5-14.5)


 


Platelet Count


 164 x10^3/uL


(140-400)


 


Neutrophils (%) (Auto) 63 % (31-73) 


 


Lymphocytes (%) (Auto) 18 % (24-48) 


 


Monocytes (%) (Auto) 14 % (0-9) 


 


Eosinophils (%) (Auto) 4 % (0-3) 


 


Basophils (%) (Auto) 1 % (0-3) 


 


Neutrophils # (Auto)


 3.4 x10^3/uL


(1.8-7.7)


 


Lymphocytes # (Auto)


 1.0 x10^3/uL


(1.0-4.8)


 


Monocytes # (Auto)


 0.8 x10^3/uL


(0.0-1.1)


 


Eosinophils # (Auto)


 0.2 x10^3/uL


(0.0-0.7)


 


Basophils # (Auto)


 0.1 x10^3/uL


(0.0-0.2)


 


Sodium Level


 142 mmol/L


(136-145)


 


Potassium Level


 3.8 mmol/L


(3.5-5.1)


 


Chloride Level


 106 mmol/L


()


 


Carbon Dioxide Level


 27 mmol/L


(21-32)


 


Anion Gap 9 (6-14) 


 


Blood Urea Nitrogen


 13 mg/dL


(8-26)


 


Creatinine


 1.0 mg/dL


(0.7-1.3)


 


Estimated GFR


(Cockcroft-Gault) 76.2 





 


Glucose Level


 110 mg/dL


(70-99)


 


Calcium Level


 9.1 mg/dL


(8.5-10.1)








Microbiology


12/23/19 Anaerobic/Aerobic Culture, Resulted


           Pending


12/23/19 Anaerobic Culture Result 1 (LION), Resulted


           Pending


12/23/19 Aerobic Culture, Resulted


           Pending


12/23/19 Aerobic Culture Result 1 (LION), Resulted


           Pending


12/23/19 Gram Stain - Final, Resulted


           


12/23/19 Gram Stain Result 1 (LION) - Final, Resulted


           


12/23/19 Gram Stain Result 2 (LION) - Final, Resulted


Medications





Current Medications


Acetaminophen (Tylenol) 650 mg PRN Q4HRS  PRN PO PAIN;  Start 12/23/19 at 14:00;

 Stop 12/25/19 at 08:20;  Status DC


Ergocalciferol (Vitamin D2) 50,000 unit QMONTH PO ;  Start 1/22/20 at 09:00


Famotidine (Pepcid) 20 mg HS PO  Last administered on 12/24/19at 21:01;  Start 

12/23/19 at 21:00


Guaifenesin (Robitussin Dm) 5 ml PRN QID  PRN PO COUGH;  Start 12/23/19 at 14:00


Ascorbic Acid (Vitamin C) 500 mg BID PO  Last administered on 12/25/19at 08:36; 

Start 12/23/19 at 21:00


Clindamycin HCl (Cleocin) 300 mg TID PO  Last administered on 12/24/19at 08:39; 

Start 12/23/19 at 14:00;  Stop 12/24/19 at 09:30;  Status DC


Magnesium Hydroxide (Milk Of Magnesia) 2,400 mg PRN DAILY  PRN PO CONSTIPATION; 

Start 12/23/19 at 14:00


Mesalamine (Delzicol) 800 mg TID PO  Last administered on 12/25/19at 08:36;  

Start 12/23/19 at 15:00


Non-Formulary Medication (Protein Supplement (Nutritional Drink Mix)) 420 gm 

DAILY PO ;  Start 12/24/19 at 09:00;  Status UNV


Iohexol (Omnipaque 300 Mg/ml) 75 ml 1X  ONCE IV  Last administered on 12/24/19at

08:29;  Start 12/24/19 at 08:00;  Stop 12/24/19 at 08:01;  Status DC


Acetaminophen (Tylenol) 500 mg PRN Q6HRS  PRN PO MILD PAIN / TEMP;  Start 

12/25/19 at 08:30


Acetaminophen/ Codeine Phosphate (Tylenol #3) 1 tab PRN Q6HRS  PRN PO MODERATE 

PAIN;  Start 12/25/19 at 08:30


Oxycodone/ Acetaminophen (Percocet 5/325) 1 tab PRN Q4HRS  PRN PO SEVERE PAIN;  

Start 12/25/19 at 08:30


Morphine Sulfate (Morphine Sulfate) 2 mg PRN Q2HR  PRN IV PAIN;  Start 12/25/19 

at 08:30





Active Scripts


Active


Reported


Hydrocodone-Apap 5-325  ** (Hydrocodone Bit/Acetaminophen) 1 Tab Tablet 1-2 Tab 

PO PRN Q4HRS PRN


Tussin Dm Cough & Chest Syrup (Guaifenesin/Dextromethorphan) 118 Ml Syrup 5 Ml 

PO PRN QID PRN 12 Days


Milk Of Magnesia (Magnesium Hydroxide) 2,400 Mg/10 Ml Oral.susp 2,400 Mg PO PRN 

DAILY PRN


Tylenol (Acetaminophen) 325 Mg Tablet 650 Mg PO PRN Q4HRS PRN


Clindamycin Hcl 300 Mg Capsule 300 Mg PO TID


Vitamin D2 (Ergocalciferol (Vitamin D2)) 50,000 Unit Capsule 1 Cap PO QMONTH


Vitamin C (Ascorbate Calcium) 500 Mg Tablet 500 Mg PO BID


Nutritional Drink Mix (Protein Supplement) 420 Gm Powder 420 Gm PO DAILY


Multi-Vitamin Daily (Multivitamin) 1 Each Tablet 1 Each PO 


Famotidine 20 Mg Tablet 20 Mg PO HS


Asacol Hd (Mesalamine) 800 Mg Tablet. 1,600 Mg PO TID


Vitals/I & O





Vital Sign - Last 24 Hours








 12/24/19 12/24/19 12/24/19 12/24/19





 11:00 15:00 19:00 20:00


 


Temp 97.2 97.9 98.2 





 97.2 97.9 98.2 


 


Pulse 70 77 67 


 


Resp 16 16 18 


 


B/P (MAP) 111/61 (78) 112/61 (78) 113/66 (82) 


 


Pulse Ox 96 96 96 


 


O2 Delivery Room Air Room Air Room Air Room Air


 


    





    





 12/24/19 12/25/19 12/25/19 12/25/19





 23:00 03:00 07:00 08:00


 


Temp 98.8 98.0 97.5 





 98.8 98.0 97.5 


 


Pulse 74 62 62 


 


Resp 18 18 18 


 


B/P (MAP) 97/49 (65) 116/57 (76) 113/68 (83) 


 


Pulse Ox 97 95 95 


 


O2 Delivery Room Air Room Air Room Air Room Air














Intake and Output   


 


 12/24/19 12/24/19 12/25/19





 15:00 23:00 07:00


 


Intake Total  500 ml 


 


Output Total 300 ml  


 


Balance -300 ml 500 ml 

















JENIFER VIDALES MD           Dec 25, 2019 10:11

## 2019-12-25 NOTE — PDOC
SURGICAL PROGRESS NOTE


Subjective


Patient resting comfortably no acute changes


Vital Signs





Vital Signs








  Date Time  Temp Pulse Resp B/P (MAP) Pulse Ox O2 Delivery O2 Flow Rate FiO2


 


12/25/19 07:00 97.5 62 18 113/68 (83) 95 Room Air  





 97.5       








I&O











Intake and Output 


 


 12/25/19





 07:00


 


Intake Total 500 ml


 


Output Total 300 ml


 


Balance 200 ml


 


 


 


Intake Oral 500 ml


 


Output Stool Total 300 ml


 


# Voids 2








PATIENT HAS A CAMPOS:  No


General:  Alert, Oriented X3, Cooperative, mild distress


Labs





Laboratory Tests








Test


 12/24/19


04:35 12/25/19


05:00


 


White Blood Count


 5.8 x10^3/uL


(4.0-11.0) 5.5 x10^3/uL


(4.0-11.0)


 


Red Blood Count


 4.37 x10^6/uL


(4.30-5.70) 4.29 x10^6/uL


(4.30-5.70)


 


Hemoglobin


 13.2 g/dL


(13.0-17.5) 13.1 g/dL


(13.0-17.5)


 


Hematocrit


 39.5 %


(39.0-53.0) 38.7 %


(39.0-53.0)


 


Mean Corpuscular Volume 90 fL ()  90 fL () 


 


Mean Corpuscular Hemoglobin 30 pg (25-35)  31 pg (25-35) 


 


Mean Corpuscular Hemoglobin


Concent 34 g/dL


(31-37) 34 g/dL


(31-37)


 


Red Cell Distribution Width


 13.5 %


(11.5-14.5) 13.6 %


(11.5-14.5)


 


Platelet Count


 163 x10^3/uL


(140-400) 164 x10^3/uL


(140-400)


 


Neutrophils (%) (Auto) 71 % (31-73)  63 % (31-73) 


 


Lymphocytes (%) (Auto) 14 % (24-48)  18 % (24-48) 


 


Monocytes (%) (Auto) 13 % (0-9)  14 % (0-9) 


 


Eosinophils (%) (Auto) 3 % (0-3)  4 % (0-3) 


 


Basophils (%) (Auto) 0 % (0-3)  1 % (0-3) 


 


Neutrophils # (Auto)


 4.1 x10^3/uL


(1.8-7.7) 3.4 x10^3/uL


(1.8-7.7)


 


Lymphocytes # (Auto)


 0.8 x10^3/uL


(1.0-4.8) 1.0 x10^3/uL


(1.0-4.8)


 


Monocytes # (Auto)


 0.7 x10^3/uL


(0.0-1.1) 0.8 x10^3/uL


(0.0-1.1)


 


Eosinophils # (Auto)


 0.2 x10^3/uL


(0.0-0.7) 0.2 x10^3/uL


(0.0-0.7)


 


Basophils # (Auto)


 0.0 x10^3/uL


(0.0-0.2) 0.1 x10^3/uL


(0.0-0.2)


 


Erythrocyte Sedimentation Rate 35 (0-15)  


 


Sodium Level


 141 mmol/L


(136-145) 142 mmol/L


(136-145)


 


Potassium Level


 4.2 mmol/L


(3.5-5.1) 3.8 mmol/L


(3.5-5.1)


 


Chloride Level


 106 mmol/L


() 106 mmol/L


()


 


Carbon Dioxide Level


 27 mmol/L


(21-32) 27 mmol/L


(21-32)


 


Anion Gap 8 (6-14)  9 (6-14) 


 


Blood Urea Nitrogen


 11 mg/dL


(8-26) 13 mg/dL


(8-26)


 


Creatinine


 1.1 mg/dL


(0.7-1.3) 1.0 mg/dL


(0.7-1.3)


 


Estimated GFR


(Cockcroft-Gault) 68.3 


 76.2 





 


Glucose Level


 114 mg/dL


(70-99) 110 mg/dL


(70-99)


 


Calcium Level


 9.0 mg/dL


(8.5-10.1) 9.1 mg/dL


(8.5-10.1)


 


C-Reactive Protein,


Quantitative 42.8 mg/L


(0-3.3) 





 


Procalcitonin


 < 0.10 ng/mL


(0.00-0.10) 











Laboratory Tests








Test


 12/25/19


05:00


 


White Blood Count


 5.5 x10^3/uL


(4.0-11.0)


 


Red Blood Count


 4.29 x10^6/uL


(4.30-5.70)


 


Hemoglobin


 13.1 g/dL


(13.0-17.5)


 


Hematocrit


 38.7 %


(39.0-53.0)


 


Mean Corpuscular Volume 90 fL () 


 


Mean Corpuscular Hemoglobin 31 pg (25-35) 


 


Mean Corpuscular Hemoglobin


Concent 34 g/dL


(31-37)


 


Red Cell Distribution Width


 13.6 %


(11.5-14.5)


 


Platelet Count


 164 x10^3/uL


(140-400)


 


Neutrophils (%) (Auto) 63 % (31-73) 


 


Lymphocytes (%) (Auto) 18 % (24-48) 


 


Monocytes (%) (Auto) 14 % (0-9) 


 


Eosinophils (%) (Auto) 4 % (0-3) 


 


Basophils (%) (Auto) 1 % (0-3) 


 


Neutrophils # (Auto)


 3.4 x10^3/uL


(1.8-7.7)


 


Lymphocytes # (Auto)


 1.0 x10^3/uL


(1.0-4.8)


 


Monocytes # (Auto)


 0.8 x10^3/uL


(0.0-1.1)


 


Eosinophils # (Auto)


 0.2 x10^3/uL


(0.0-0.7)


 


Basophils # (Auto)


 0.1 x10^3/uL


(0.0-0.2)


 


Sodium Level


 142 mmol/L


(136-145)


 


Potassium Level


 3.8 mmol/L


(3.5-5.1)


 


Chloride Level


 106 mmol/L


()


 


Carbon Dioxide Level


 27 mmol/L


(21-32)


 


Anion Gap 9 (6-14) 


 


Blood Urea Nitrogen


 13 mg/dL


(8-26)


 


Creatinine


 1.0 mg/dL


(0.7-1.3)


 


Estimated GFR


(Cockcroft-Gault) 76.2 





 


Glucose Level


 110 mg/dL


(70-99)


 


Calcium Level


 9.1 mg/dL


(8.5-10.1)








Assessment/Plan


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


No new surgical recommendations











GUNJAN ELIZABETH MD             Dec 25, 2019 09:25

## 2019-12-26 VITALS — DIASTOLIC BLOOD PRESSURE: 71 MMHG | SYSTOLIC BLOOD PRESSURE: 114 MMHG

## 2019-12-26 VITALS — DIASTOLIC BLOOD PRESSURE: 63 MMHG | SYSTOLIC BLOOD PRESSURE: 114 MMHG

## 2019-12-26 VITALS — DIASTOLIC BLOOD PRESSURE: 68 MMHG | SYSTOLIC BLOOD PRESSURE: 118 MMHG

## 2019-12-26 VITALS — DIASTOLIC BLOOD PRESSURE: 78 MMHG | SYSTOLIC BLOOD PRESSURE: 149 MMHG

## 2019-12-26 VITALS — DIASTOLIC BLOOD PRESSURE: 64 MMHG | SYSTOLIC BLOOD PRESSURE: 110 MMHG

## 2019-12-26 VITALS — SYSTOLIC BLOOD PRESSURE: 120 MMHG | DIASTOLIC BLOOD PRESSURE: 74 MMHG

## 2019-12-26 LAB
ANION GAP SERPL CALC-SCNC: 10 MMOL/L (ref 6–14)
BASOPHILS # BLD AUTO: 0 X10^3/UL (ref 0–0.2)
BASOPHILS NFR BLD: 1 % (ref 0–3)
BUN SERPL-MCNC: 18 MG/DL (ref 8–26)
CALCIUM SERPL-MCNC: 8.4 MG/DL (ref 8.5–10.1)
CHLORIDE SERPL-SCNC: 105 MMOL/L (ref 98–107)
CO2 SERPL-SCNC: 27 MMOL/L (ref 21–32)
CREAT SERPL-MCNC: 1 MG/DL (ref 0.7–1.3)
EOSINOPHIL NFR BLD: 0.2 X10^3/UL (ref 0–0.7)
EOSINOPHIL NFR BLD: 3 % (ref 0–3)
ERYTHROCYTE [DISTWIDTH] IN BLOOD BY AUTOMATED COUNT: 13.5 % (ref 11.5–14.5)
GFR SERPLBLD BASED ON 1.73 SQ M-ARVRAT: 76.2 ML/MIN
GLUCOSE SERPL-MCNC: 102 MG/DL (ref 70–99)
HCT VFR BLD CALC: 38.2 % (ref 39–53)
HGB BLD-MCNC: 12.9 G/DL (ref 13–17.5)
LYMPHOCYTES # BLD: 1.1 X10^3/UL (ref 1–4.8)
LYMPHOCYTES NFR BLD AUTO: 21 % (ref 24–48)
MCH RBC QN AUTO: 31 PG (ref 25–35)
MCHC RBC AUTO-ENTMCNC: 34 G/DL (ref 31–37)
MCV RBC AUTO: 90 FL (ref 79–100)
MONO #: 0.6 X10^3/UL (ref 0–1.1)
MONOCYTES NFR BLD: 13 % (ref 0–9)
NEUT #: 3.2 X10^3/UL (ref 1.8–7.7)
NEUTROPHILS NFR BLD AUTO: 62 % (ref 31–73)
PLATELET # BLD AUTO: 164 X10^3/UL (ref 140–400)
POTASSIUM SERPL-SCNC: 4 MMOL/L (ref 3.5–5.1)
RBC # BLD AUTO: 4.23 X10^6/UL (ref 4.3–5.7)
SODIUM SERPL-SCNC: 142 MMOL/L (ref 136–145)
WBC # BLD AUTO: 5.1 X10^3/UL (ref 4–11)

## 2019-12-26 RX ADMIN — FAMOTIDINE SCH MG: 20 TABLET ORAL at 20:42

## 2019-12-26 RX ADMIN — OXYCODONE HYDROCHLORIDE AND ACETAMINOPHEN PRN TAB: 5; 325 TABLET ORAL at 16:37

## 2019-12-26 RX ADMIN — MESALAMINE SCH MG: 400 CAPSULE, DELAYED RELEASE ORAL at 20:42

## 2019-12-26 RX ADMIN — OXYCODONE HYDROCHLORIDE AND ACETAMINOPHEN SCH MG: 500 TABLET ORAL at 20:42

## 2019-12-26 RX ADMIN — OXYCODONE HYDROCHLORIDE AND ACETAMINOPHEN SCH MG: 500 TABLET ORAL at 08:06

## 2019-12-26 RX ADMIN — MESALAMINE SCH MG: 400 CAPSULE, DELAYED RELEASE ORAL at 13:21

## 2019-12-26 RX ADMIN — MESALAMINE SCH MG: 400 CAPSULE, DELAYED RELEASE ORAL at 08:07

## 2019-12-26 NOTE — PDOC
PROGRESS NOTES


Chief Complaint


Chief Complaint


Perianal wound - with bleeding NON SURGICAL /MED mx


Severe Crohn's with multiple surgeries s/p ostomy (multiple fissures and 

abdominal wounds)


Chronic pain


Hyperlipidemia


Hypertension


GERD


Incidenatl 5 mm rt kidney stone


HIatal hernia with small bowel loops - known to him





History of Present Illness


History of Present Illness


RECtal wounds inspected, erythema round but no induration, 


5 coccygeal wounds, wound care on board


Pt asks me about PICC and IV abx


RN asks me about plan


ID note says observe off abx


GS note mentions IV PICC abx etc possible option in prep for GS


I reached out to ID





NO fevers


ID on board


Ambulatory,. non toxic appearing


HX crohns for yrs now


[place]ives in [place] and has HH wound care


Incidental 5 mm RT kidney stone, no pains





PLAN:


Off abx vs PICC line with IV abx - informed ID


NO surgical plans so far THIS ADMISSOn


Dw him and left his CT results


dw RN





Vitals


Vitals





Vital Signs








  Date Time  Temp Pulse Resp B/P (MAP) Pulse Ox O2 Delivery O2 Flow Rate FiO2


 


12/26/19 08:00      Room Air  


 


12/26/19 07:00 98.3 77 18 149/78 (101) 95   





 98.3       











Physical Exam


Physical Exam


GENERAL:  The patient is propped up in bed, alert, in no apparent distress.


HEENT:  Pupils equally round.  Oropharynx pink and moist.


NECK:  Supple, no lymphadenopathy.


LUNGS:  Clear to auscultation.


HEART:  S1, S2.


ABDOMEN:  Obese, soft, nontender with bowel sounds present.  Multiple scars and


right-sided ostomy.


EXTREMITIES:  No gross edema or cyanosis.


SKIN:  Warm to touch without signs of rash.  He has several sacrococcygeal


fistulas bleeding and packed with Nu Gauze.  No overt signs of infection.


NEUROLOGIC:  Alert.  Answering questions appropriately.


General:  Alert, Oriented X3, Cooperative


Heart:  Regular rate, Normal S1, Normal S2


Lungs:  Clear


Abdomen:  Soft, No tenderness


Extremities:  No clubbing, No cyanosis


Skin:  Other (dressing dry, in place)





Labs


LABS





Laboratory Tests








Test


 12/26/19


03:42


 


White Blood Count


 5.1 x10^3/uL


(4.0-11.0)


 


Red Blood Count


 4.23 x10^6/uL


(4.30-5.70)


 


Hemoglobin


 12.9 g/dL


(13.0-17.5)


 


Hematocrit


 38.2 %


(39.0-53.0)


 


Mean Corpuscular Volume 90 fL () 


 


Mean Corpuscular Hemoglobin 31 pg (25-35) 


 


Mean Corpuscular Hemoglobin


Concent 34 g/dL


(31-37)


 


Red Cell Distribution Width


 13.5 %


(11.5-14.5)


 


Platelet Count


 164 x10^3/uL


(140-400)


 


Neutrophils (%) (Auto) 62 % (31-73) 


 


Lymphocytes (%) (Auto) 21 % (24-48) 


 


Monocytes (%) (Auto) 13 % (0-9) 


 


Eosinophils (%) (Auto) 3 % (0-3) 


 


Basophils (%) (Auto) 1 % (0-3) 


 


Neutrophils # (Auto)


 3.2 x10^3/uL


(1.8-7.7)


 


Lymphocytes # (Auto)


 1.1 x10^3/uL


(1.0-4.8)


 


Monocytes # (Auto)


 0.6 x10^3/uL


(0.0-1.1)


 


Eosinophils # (Auto)


 0.2 x10^3/uL


(0.0-0.7)


 


Basophils # (Auto)


 0.0 x10^3/uL


(0.0-0.2)


 


Sodium Level


 142 mmol/L


(136-145)


 


Potassium Level


 4.0 mmol/L


(3.5-5.1)


 


Chloride Level


 105 mmol/L


()


 


Carbon Dioxide Level


 27 mmol/L


(21-32)


 


Anion Gap 10 (6-14) 


 


Blood Urea Nitrogen


 18 mg/dL


(8-26)


 


Creatinine


 1.0 mg/dL


(0.7-1.3)


 


Estimated GFR


(Cockcroft-Gault) 76.2 





 


Glucose Level


 102 mg/dL


(70-99)


 


Calcium Level


 8.4 mg/dL


(8.5-10.1)











Review of Systems


Review of Systems


no complaints, neg 14 pt





Comment


Review of Relevant


I have reviewed the following items crow (where applicable) has been applied.


Labs





Laboratory Tests








Test


 12/25/19


05:00 12/26/19


03:42


 


White Blood Count


 5.5 x10^3/uL


(4.0-11.0) 5.1 x10^3/uL


(4.0-11.0)


 


Red Blood Count


 4.29 x10^6/uL


(4.30-5.70) 4.23 x10^6/uL


(4.30-5.70)


 


Hemoglobin


 13.1 g/dL


(13.0-17.5) 12.9 g/dL


(13.0-17.5)


 


Hematocrit


 38.7 %


(39.0-53.0) 38.2 %


(39.0-53.0)


 


Mean Corpuscular Volume 90 fL ()  90 fL () 


 


Mean Corpuscular Hemoglobin 31 pg (25-35)  31 pg (25-35) 


 


Mean Corpuscular Hemoglobin


Concent 34 g/dL


(31-37) 34 g/dL


(31-37)


 


Red Cell Distribution Width


 13.6 %


(11.5-14.5) 13.5 %


(11.5-14.5)


 


Platelet Count


 164 x10^3/uL


(140-400) 164 x10^3/uL


(140-400)


 


Neutrophils (%) (Auto) 63 % (31-73)  62 % (31-73) 


 


Lymphocytes (%) (Auto) 18 % (24-48)  21 % (24-48) 


 


Monocytes (%) (Auto) 14 % (0-9)  13 % (0-9) 


 


Eosinophils (%) (Auto) 4 % (0-3)  3 % (0-3) 


 


Basophils (%) (Auto) 1 % (0-3)  1 % (0-3) 


 


Neutrophils # (Auto)


 3.4 x10^3/uL


(1.8-7.7) 3.2 x10^3/uL


(1.8-7.7)


 


Lymphocytes # (Auto)


 1.0 x10^3/uL


(1.0-4.8) 1.1 x10^3/uL


(1.0-4.8)


 


Monocytes # (Auto)


 0.8 x10^3/uL


(0.0-1.1) 0.6 x10^3/uL


(0.0-1.1)


 


Eosinophils # (Auto)


 0.2 x10^3/uL


(0.0-0.7) 0.2 x10^3/uL


(0.0-0.7)


 


Basophils # (Auto)


 0.1 x10^3/uL


(0.0-0.2) 0.0 x10^3/uL


(0.0-0.2)


 


Sodium Level


 142 mmol/L


(136-145) 142 mmol/L


(136-145)


 


Potassium Level


 3.8 mmol/L


(3.5-5.1) 4.0 mmol/L


(3.5-5.1)


 


Chloride Level


 106 mmol/L


() 105 mmol/L


()


 


Carbon Dioxide Level


 27 mmol/L


(21-32) 27 mmol/L


(21-32)


 


Anion Gap 9 (6-14)  10 (6-14) 


 


Blood Urea Nitrogen


 13 mg/dL


(8-26) 18 mg/dL


(8-26)


 


Creatinine


 1.0 mg/dL


(0.7-1.3) 1.0 mg/dL


(0.7-1.3)


 


Estimated GFR


(Cockcroft-Gault) 76.2 


 76.2 





 


Glucose Level


 110 mg/dL


(70-99) 102 mg/dL


(70-99)


 


Calcium Level


 9.1 mg/dL


(8.5-10.1) 8.4 mg/dL


(8.5-10.1)








Laboratory Tests








Test


 12/26/19


03:42


 


White Blood Count


 5.1 x10^3/uL


(4.0-11.0)


 


Red Blood Count


 4.23 x10^6/uL


(4.30-5.70)


 


Hemoglobin


 12.9 g/dL


(13.0-17.5)


 


Hematocrit


 38.2 %


(39.0-53.0)


 


Mean Corpuscular Volume 90 fL () 


 


Mean Corpuscular Hemoglobin 31 pg (25-35) 


 


Mean Corpuscular Hemoglobin


Concent 34 g/dL


(31-37)


 


Red Cell Distribution Width


 13.5 %


(11.5-14.5)


 


Platelet Count


 164 x10^3/uL


(140-400)


 


Neutrophils (%) (Auto) 62 % (31-73) 


 


Lymphocytes (%) (Auto) 21 % (24-48) 


 


Monocytes (%) (Auto) 13 % (0-9) 


 


Eosinophils (%) (Auto) 3 % (0-3) 


 


Basophils (%) (Auto) 1 % (0-3) 


 


Neutrophils # (Auto)


 3.2 x10^3/uL


(1.8-7.7)


 


Lymphocytes # (Auto)


 1.1 x10^3/uL


(1.0-4.8)


 


Monocytes # (Auto)


 0.6 x10^3/uL


(0.0-1.1)


 


Eosinophils # (Auto)


 0.2 x10^3/uL


(0.0-0.7)


 


Basophils # (Auto)


 0.0 x10^3/uL


(0.0-0.2)


 


Sodium Level


 142 mmol/L


(136-145)


 


Potassium Level


 4.0 mmol/L


(3.5-5.1)


 


Chloride Level


 105 mmol/L


()


 


Carbon Dioxide Level


 27 mmol/L


(21-32)


 


Anion Gap 10 (6-14) 


 


Blood Urea Nitrogen


 18 mg/dL


(8-26)


 


Creatinine


 1.0 mg/dL


(0.7-1.3)


 


Estimated GFR


(Cockcroft-Gault) 76.2 





 


Glucose Level


 102 mg/dL


(70-99)


 


Calcium Level


 8.4 mg/dL


(8.5-10.1)








Microbiology


12/23/19 Anaerobic/Aerobic Culture, Resulted


           Pending


12/23/19 Anaerobic Culture Result 1 (LION), Resulted


           Pending


12/23/19 Aerobic Culture - Preliminary, Resulted


           


12/23/19 Aerobic Culture Result 1 (LION) - Preliminary, Resulted


           


12/23/19 Gram Stain - Final, Resulted


           


12/23/19 Gram Stain Result 1 (LION) - Final, Resulted


           


12/23/19 Gram Stain Result 2 (LION) - Final, Resulted


Medications





Current Medications


Acetaminophen (Tylenol) 650 mg PRN Q4HRS  PRN PO PAIN;  Start 12/23/19 at 14:00;

 Stop 12/25/19 at 08:20;  Status DC


Ergocalciferol (Vitamin D2) 50,000 unit QMONTH PO ;  Start 1/22/20 at 09:00


Famotidine (Pepcid) 20 mg HS PO  Last administered on 12/25/19at 20:49;  Start 

12/23/19 at 21:00


Guaifenesin (Robitussin Dm) 5 ml PRN QID  PRN PO COUGH;  Start 12/23/19 at 14:00


Ascorbic Acid (Vitamin C) 500 mg BID PO  Last administered on 12/26/19at 08:06; 

Start 12/23/19 at 21:00


Clindamycin HCl (Cleocin) 300 mg TID PO  Last administered on 12/24/19at 08:39; 

Start 12/23/19 at 14:00;  Stop 12/24/19 at 09:30;  Status DC


Magnesium Hydroxide (Milk Of Magnesia) 2,400 mg PRN DAILY  PRN PO CONSTIPATION; 

Start 12/23/19 at 14:00


Mesalamine (Delzicol) 800 mg TID PO  Last administered on 12/26/19at 08:07;  

Start 12/23/19 at 15:00


Non-Formulary Medication (Protein Supplement (Nutritional Drink Mix)) 420 gm 

DAILY PO ;  Start 12/24/19 at 09:00;  Status UNV


Iohexol (Omnipaque 300 Mg/ml) 75 ml 1X  ONCE IV  Last administered on 12/24/19at

08:29;  Start 12/24/19 at 08:00;  Stop 12/24/19 at 08:01;  Status DC


Acetaminophen (Tylenol) 500 mg PRN Q6HRS  PRN PO MILD PAIN / TEMP;  Start 

12/25/19 at 08:30


Acetaminophen/ Codeine Phosphate (Tylenol #3) 1 tab PRN Q6HRS  PRN PO MODERATE 

PAIN;  Start 12/25/19 at 08:30


Oxycodone/ Acetaminophen (Percocet 5/325) 1 tab PRN Q4HRS  PRN PO SEVERE PAIN 

Last administered on 12/25/19at 15:32;  Start 12/25/19 at 08:30


Morphine Sulfate (Morphine Sulfate) 2 mg PRN Q2HR  PRN IV PAIN;  Start 12/25/19 

at 08:30





Active Scripts


Active


Reported


Hydrocodone-Apap 5-325  ** (Hydrocodone Bit/Acetaminophen) 1 Tab Tablet 1-2 Tab 

PO PRN Q4HRS PRN


Tussin Dm Cough & Chest Syrup (Guaifenesin/Dextromethorphan) 118 Ml Syrup 5 Ml 

PO PRN QID PRN 12 Days


Milk Of Magnesia (Magnesium Hydroxide) 2,400 Mg/10 Ml Oral.susp 2,400 Mg PO PRN 

DAILY PRN


Tylenol (Acetaminophen) 325 Mg Tablet 650 Mg PO PRN Q4HRS PRN


Clindamycin Hcl 300 Mg Capsule 300 Mg PO TID


Vitamin D2 (Ergocalciferol (Vitamin D2)) 50,000 Unit Capsule 1 Cap PO QMONTH


Vitamin C (Ascorbate Calcium) 500 Mg Tablet 500 Mg PO BID


Nutritional Drink Mix (Protein Supplement) 420 Gm Powder 420 Gm PO DAILY


Multi-Vitamin Daily (Multivitamin) 1 Each Tablet 1 Each PO 


Famotidine 20 Mg Tablet 20 Mg PO HS


Asacol Hd (Mesalamine) 800 Mg Tablet. 1,600 Mg PO TID


Vitals/I & O





Vital Sign - Last 24 Hours








 12/25/19 12/25/19 12/25/19 12/25/19





 11:05 15:00 15:32 16:37


 


Temp 97.6 98.5  





 97.6 98.5  


 


Pulse 64 60  


 


Resp 18 18  


 


B/P (MAP) 118/63 (81) 123/57 (79)  


 


Pulse Ox 95 96  


 


O2 Delivery Room Air Room Air Room Air Room Air


 


    





    





 12/25/19 12/25/19 12/26/19 12/26/19





 19:00 23:00 03:00 07:00


 


Temp 98.0 98.1 98.2 98.3





 98.0 98.1 98.2 98.3


 


Pulse 73 60 58 77


 


Resp 16 18 16 18


 


B/P (MAP) 116/61 (79) 118/62 (80) 118/68 (85) 149/78 (101)


 


Pulse Ox 96 93 95 95


 


O2 Delivery    Room Air


 


    





    





 12/26/19   





 08:00   


 


O2 Delivery Room Air   














Intake and Output   


 


 12/25/19 12/25/19 12/26/19





 15:00 23:00 07:00


 


Intake Total 960 ml 500 ml 


 


Balance 960 ml 500 ml 

















JENIFER VIDALES MD           Dec 26, 2019 10:50

## 2019-12-26 NOTE — PDOC
Infectious Disease Note


Subjective:


Subjective


pt doing ok


no f/c/n/v/d/abdo pain





Vital Signs:


Vital Signs





Vital Signs








  Date Time  Temp Pulse Resp B/P (MAP) Pulse Ox O2 Delivery O2 Flow Rate FiO2


 


19 08:00      Room Air  


 


19 07:00 98.3 77 18 149/78 (101) 95   





 98.3       











Physical Exam:


PHYSICAL EXAM


GENERAL:  The patient is propped up in bed, alert, in no apparent distress.


HEENT:  Pupils equally round.  Oropharynx pink and moist.


NECK:  Supple, no lymphadenopathy.


LUNGS:  Clear to auscultation.


HEART:  S1, S2.


ABDOMEN:  Obese, soft, nontender with bowel sounds present.  Multiple scars and


right-sided ostomy.


EXTREMITIES:  No gross edema or cyanosis.


SKIN:  Warm to touch without signs of rash.  He has several sacrococcygeal


fistulas bleeding and packed with Nu Gauze.  No overt signs of infection.


NEUROLOGIC:  Alert.  Answering questions appropriately.





Medications:


Inpatient Meds:





Current Medications








 Medications


  (Trade)  Dose


 Ordered  Sig/Lisa  Start Time


 Stop Time Status Last Admin


Dose Admin


 


 Acetaminophen


  (Tylenol)  500 mg  PRN Q6HRS  PRN  19 08:30


     





 


 Acetaminophen/


 Codeine Phosphate


  (Tylenol #3)  1 tab  PRN Q6HRS  PRN  19 08:30


     





 


 Ascorbic Acid


  (Vitamin C)  500 mg  BID  19 21:00


    19 08:06


500 MG


 


 Clindamycin HCl


  (Cleocin)  300 mg  TID  19 14:00


 19 09:30 DC 19 08:39


300 MG


 


 Ergocalciferol


  (Vitamin D2)  50,000 unit  QMONTH  20 09:00


     





 


 Famotidine


  (Pepcid)  20 mg  HS  19 21:00


    19 20:49


20 MG


 


 Guaifenesin


  (Robitussin Dm)  5 ml  PRN QID  PRN  19 14:00


     





 


 Iohexol


  (Omnipaque 300


 Mg/ml)  75 ml  1X  ONCE  19 08:00


 19 08:01 DC 19 08:29


75 ML


 


 Magnesium


 Hydroxide


  (Milk Of


 Magnesia)  2,400 mg  PRN DAILY  PRN  19 14:00


     





 


 Mesalamine


  (Delzicol)  800 mg  TID  19 15:00


    19 08:07


800 MG


 


 Morphine Sulfate


  (Morphine


 Sulfate)  2 mg  PRN Q2HR  PRN  19 08:30


     





 


 Non-Formulary


 Medication


  (Protein


 Supplement


  (Nutritional


 Drink Mix))  420 gm  DAILY  19 09:00


   UNV  





 


 Oxycodone/


 Acetaminophen


  (Percocet 5/325)  1 tab  PRN Q4HRS  PRN  19 08:30


    19 15:32


1 TAB











Labs:


Lab





Laboratory Tests








Test


 19


03:42


 


White Blood Count


 5.1 x10^3/uL


(4.0-11.0)


 


Red Blood Count


 4.23 x10^6/uL


(4.30-5.70)


 


Hemoglobin


 12.9 g/dL


(13.0-17.5)


 


Hematocrit


 38.2 %


(39.0-53.0)


 


Mean Corpuscular Volume 90 fL () 


 


Mean Corpuscular Hemoglobin 31 pg (25-35) 


 


Mean Corpuscular Hemoglobin


Concent 34 g/dL


(31-37)


 


Red Cell Distribution Width


 13.5 %


(11.5-14.5)


 


Platelet Count


 164 x10^3/uL


(140-400)


 


Neutrophils (%) (Auto) 62 % (31-73) 


 


Lymphocytes (%) (Auto) 21 % (24-48) 


 


Monocytes (%) (Auto) 13 % (0-9) 


 


Eosinophils (%) (Auto) 3 % (0-3) 


 


Basophils (%) (Auto) 1 % (0-3) 


 


Neutrophils # (Auto)


 3.2 x10^3/uL


(1.8-7.7)


 


Lymphocytes # (Auto)


 1.1 x10^3/uL


(1.0-4.8)


 


Monocytes # (Auto)


 0.6 x10^3/uL


(0.0-1.1)


 


Eosinophils # (Auto)


 0.2 x10^3/uL


(0.0-0.7)


 


Basophils # (Auto)


 0.0 x10^3/uL


(0.0-0.2)


 


Sodium Level


 142 mmol/L


(136-145)


 


Potassium Level


 4.0 mmol/L


(3.5-5.1)


 


Chloride Level


 105 mmol/L


()


 


Carbon Dioxide Level


 27 mmol/L


(21-32)


 


Anion Gap 10 (6-14) 


 


Blood Urea Nitrogen


 18 mg/dL


(8-26)


 


Creatinine


 1.0 mg/dL


(0.7-1.3)


 


Estimated GFR


(Cockcroft-Gault) 76.2 





 


Glucose Level


 102 mg/dL


(70-99)


 


Calcium Level


 8.4 mg/dL


(8.5-10.1)








Micro





 

--------------------------------------------------------------------------------


------------





RUN DATE: 19                  [facility] *LIVE*               

  PAGE 1   


RUN TIME: 1511                            Specimen Inquiry                    


 

--------------------------------------------------------------------------------


------------





PATIENT: ANIVAL DOWD EUGENE                     ACCT: [id number] LOC:  [address] [place] [address]      

U: [id number]


                                       AGE/SX: 59/M         ROOM: 562        

RE19


REG DR:  RANDALL PAULSON III DO          :    1960   BED:  1          

DIS:         


                                       STATUS: ADM IN       TLOC:           


----------------------------------------------------

----------------------------------------








SPEC #: [id number]       TENZIN:      STATUS:  RES            REQ 

#: [id number]


                            RECD: 19-     SUBM DR: RANDALL PAULSON III, DO          


SOURCE: [facility]              ENTR:      MELVIN DR: DONIS GARCÍA 

DO          


[facility]: WOUND                                                                   

          


ORDERED:  ANAER/AEROB/GS                                                        

 


COMMENTS: SACRAL WOUND                                                


---------------------------------------------------------------------------

-----------------





  Procedure                         Result                                      

         


------------------

--------------------------------------------------------------------------





  ANAEROBIC-AEROBIC CULTURE  


                                PENDING





  ANAEROBIC RES 1  


                                PENDING





  AEROBIC CULT  Preliminary  


        Preliminary report





  AEROBIC RES 1  Preliminary  


        Mixed site claudia.





        2+





  GRAM STAIN  Final  


        Final report





  GRAM STAIN RES 1  Final  


        Comment





        No white blood cells seen.





  GRAM STAIN RES 2  Final  


        No organisms seen





        Performed at:  [facility]


        [address], [place], [place]  [id number]


        : YAW Vidales MD, Phone:  [phone]





 

--------------------------------------------------------------------------------


------------

















                                   ** END OF REPORT **





Objective:


Assessment:


 


1.  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


2.  Crohn's disease with history of ostomy and multiple fistula repairs.


3.  Acute and chronic OM changes on CT abdomen and pelvis ,Pt has been treated 

with previous OM at [facility] in the past . Chronic OM could be from the same


ESR 35


4. Gastroesophageal reflux disease.


4.  Obesity.


5.  Kidney stone on CT Abdomen





Plan:


Plan of Care


Observe off antibiotics for now


Gen surgery plans  no surgical intervention


IR consult for bone biopsy


send tissue for cults 





D/W ALEXANDER SANCHEZ MD           Dec 26, 2019 10:31

## 2019-12-26 NOTE — PDOC
DIVINE DIALLO APRN 12/26/19 0924:


SURGICAL PROGRESS NOTE


Subjective


resting


currently no complaints


Vital Signs





Vital Signs








  Date Time  Temp Pulse Resp B/P (MAP) Pulse Ox O2 Delivery O2 Flow Rate FiO2


 


12/26/19 08:00      Room Air  


 


12/26/19 07:00 98.3 77 18 149/78 (101) 95   





 98.3       








I&O











Intake and Output 


 


 12/26/19





 07:00


 


Intake Total 1460 ml


 


Balance 1460 ml


 


 


 


Intake Oral 1460 ml


 


# Voids 4


 


# Bowel Movements 1








General:  Alert, Oriented X3, Cooperative


Skin:  Other (dressing dry, in place)


Labs





Laboratory Tests








Test


 12/25/19


05:00 12/26/19


03:42


 


White Blood Count


 5.5 x10^3/uL


(4.0-11.0) 5.1 x10^3/uL


(4.0-11.0)


 


Red Blood Count


 4.29 x10^6/uL


(4.30-5.70) 4.23 x10^6/uL


(4.30-5.70)


 


Hemoglobin


 13.1 g/dL


(13.0-17.5) 12.9 g/dL


(13.0-17.5)


 


Hematocrit


 38.7 %


(39.0-53.0) 38.2 %


(39.0-53.0)


 


Mean Corpuscular Volume 90 fL ()  90 fL () 


 


Mean Corpuscular Hemoglobin 31 pg (25-35)  31 pg (25-35) 


 


Mean Corpuscular Hemoglobin


Concent 34 g/dL


(31-37) 34 g/dL


(31-37)


 


Red Cell Distribution Width


 13.6 %


(11.5-14.5) 13.5 %


(11.5-14.5)


 


Platelet Count


 164 x10^3/uL


(140-400) 164 x10^3/uL


(140-400)


 


Neutrophils (%) (Auto) 63 % (31-73)  62 % (31-73) 


 


Lymphocytes (%) (Auto) 18 % (24-48)  21 % (24-48) 


 


Monocytes (%) (Auto) 14 % (0-9)  13 % (0-9) 


 


Eosinophils (%) (Auto) 4 % (0-3)  3 % (0-3) 


 


Basophils (%) (Auto) 1 % (0-3)  1 % (0-3) 


 


Neutrophils # (Auto)


 3.4 x10^3/uL


(1.8-7.7) 3.2 x10^3/uL


(1.8-7.7)


 


Lymphocytes # (Auto)


 1.0 x10^3/uL


(1.0-4.8) 1.1 x10^3/uL


(1.0-4.8)


 


Monocytes # (Auto)


 0.8 x10^3/uL


(0.0-1.1) 0.6 x10^3/uL


(0.0-1.1)


 


Eosinophils # (Auto)


 0.2 x10^3/uL


(0.0-0.7) 0.2 x10^3/uL


(0.0-0.7)


 


Basophils # (Auto)


 0.1 x10^3/uL


(0.0-0.2) 0.0 x10^3/uL


(0.0-0.2)


 


Sodium Level


 142 mmol/L


(136-145) 142 mmol/L


(136-145)


 


Potassium Level


 3.8 mmol/L


(3.5-5.1) 4.0 mmol/L


(3.5-5.1)


 


Chloride Level


 106 mmol/L


() 105 mmol/L


()


 


Carbon Dioxide Level


 27 mmol/L


(21-32) 27 mmol/L


(21-32)


 


Anion Gap 9 (6-14)  10 (6-14) 


 


Blood Urea Nitrogen


 13 mg/dL


(8-26) 18 mg/dL


(8-26)


 


Creatinine


 1.0 mg/dL


(0.7-1.3) 1.0 mg/dL


(0.7-1.3)


 


Estimated GFR


(Cockcroft-Gault) 76.2 


 76.2 





 


Glucose Level


 110 mg/dL


(70-99) 102 mg/dL


(70-99)


 


Calcium Level


 9.1 mg/dL


(8.5-10.1) 8.4 mg/dL


(8.5-10.1)








Laboratory Tests








Test


 12/26/19


03:42


 


White Blood Count


 5.1 x10^3/uL


(4.0-11.0)


 


Red Blood Count


 4.23 x10^6/uL


(4.30-5.70)


 


Hemoglobin


 12.9 g/dL


(13.0-17.5)


 


Hematocrit


 38.2 %


(39.0-53.0)


 


Mean Corpuscular Volume 90 fL () 


 


Mean Corpuscular Hemoglobin 31 pg (25-35) 


 


Mean Corpuscular Hemoglobin


Concent 34 g/dL


(31-37)


 


Red Cell Distribution Width


 13.5 %


(11.5-14.5)


 


Platelet Count


 164 x10^3/uL


(140-400)


 


Neutrophils (%) (Auto) 62 % (31-73) 


 


Lymphocytes (%) (Auto) 21 % (24-48) 


 


Monocytes (%) (Auto) 13 % (0-9) 


 


Eosinophils (%) (Auto) 3 % (0-3) 


 


Basophils (%) (Auto) 1 % (0-3) 


 


Neutrophils # (Auto)


 3.2 x10^3/uL


(1.8-7.7)


 


Lymphocytes # (Auto)


 1.1 x10^3/uL


(1.0-4.8)


 


Monocytes # (Auto)


 0.6 x10^3/uL


(0.0-1.1)


 


Eosinophils # (Auto)


 0.2 x10^3/uL


(0.0-0.7)


 


Basophils # (Auto)


 0.0 x10^3/uL


(0.0-0.2)


 


Sodium Level


 142 mmol/L


(136-145)


 


Potassium Level


 4.0 mmol/L


(3.5-5.1)


 


Chloride Level


 105 mmol/L


()


 


Carbon Dioxide Level


 27 mmol/L


(21-32)


 


Anion Gap 10 (6-14) 


 


Blood Urea Nitrogen


 18 mg/dL


(8-26)


 


Creatinine


 1.0 mg/dL


(0.7-1.3)


 


Estimated GFR


(Cockcroft-Gault) 76.2 





 


Glucose Level


 102 mg/dL


(70-99)


 


Calcium Level


 8.4 mg/dL


(8.5-10.1)








Assessment/Plan


wounds, continue wound care 


will review with Dr Susannah YOUNG,ORI NASSAR MD 12/26/19 9413:


SURGICAL PROGRESS NOTE


Assessment/Plan


Pt seen and examined.


Agree with Ms. Diallo's note


Pt without new c/o, no active bleeding


d/w pt wound care and abx versus surgical intervention.


Pt has had multiple previous debridement


given osteo by CT, may best be served by several month IV abx prior to 

consideration of revision surgery.


Pt not currently interested in surgery, which is reasonable











DIVINE DIALLO            Dec 26, 2019 09:24


ORI YOUNG MD             Dec 26, 2019 09:45

## 2019-12-26 NOTE — NUR
SW following. Chart reviewed, discussed with RN. Pt is from [facility] (ph: 
[phone]). Per RN, pt has home health for wound care. Per RN, pt is not ready for 
discharge today. SW will continue to follow for discharge planning needs.

## 2019-12-27 VITALS — SYSTOLIC BLOOD PRESSURE: 111 MMHG | DIASTOLIC BLOOD PRESSURE: 63 MMHG

## 2019-12-27 VITALS — DIASTOLIC BLOOD PRESSURE: 90 MMHG | SYSTOLIC BLOOD PRESSURE: 135 MMHG

## 2019-12-27 VITALS — SYSTOLIC BLOOD PRESSURE: 110 MMHG | DIASTOLIC BLOOD PRESSURE: 64 MMHG

## 2019-12-27 VITALS — DIASTOLIC BLOOD PRESSURE: 70 MMHG | SYSTOLIC BLOOD PRESSURE: 120 MMHG

## 2019-12-27 VITALS — DIASTOLIC BLOOD PRESSURE: 45 MMHG | SYSTOLIC BLOOD PRESSURE: 100 MMHG

## 2019-12-27 VITALS — DIASTOLIC BLOOD PRESSURE: 69 MMHG | SYSTOLIC BLOOD PRESSURE: 126 MMHG

## 2019-12-27 VITALS — DIASTOLIC BLOOD PRESSURE: 58 MMHG | SYSTOLIC BLOOD PRESSURE: 102 MMHG

## 2019-12-27 VITALS — DIASTOLIC BLOOD PRESSURE: 51 MMHG | SYSTOLIC BLOOD PRESSURE: 96 MMHG

## 2019-12-27 LAB
ANION GAP SERPL CALC-SCNC: 6 MMOL/L (ref 6–14)
BASOPHILS # BLD AUTO: 0 X10^3/UL (ref 0–0.2)
BASOPHILS NFR BLD: 1 % (ref 0–3)
BUN SERPL-MCNC: 14 MG/DL (ref 8–26)
CALCIUM SERPL-MCNC: 9 MG/DL (ref 8.5–10.1)
CHLORIDE SERPL-SCNC: 106 MMOL/L (ref 98–107)
CO2 SERPL-SCNC: 28 MMOL/L (ref 21–32)
CREAT SERPL-MCNC: 1 MG/DL (ref 0.7–1.3)
EOSINOPHIL NFR BLD: 0.1 X10^3/UL (ref 0–0.7)
EOSINOPHIL NFR BLD: 3 % (ref 0–3)
ERYTHROCYTE [DISTWIDTH] IN BLOOD BY AUTOMATED COUNT: 13.6 % (ref 11.5–14.5)
GFR SERPLBLD BASED ON 1.73 SQ M-ARVRAT: 76.2 ML/MIN
GLUCOSE SERPL-MCNC: 104 MG/DL (ref 70–99)
HCT VFR BLD CALC: 38.6 % (ref 39–53)
HGB BLD-MCNC: 13.1 G/DL (ref 13–17.5)
LYMPHOCYTES # BLD: 1 X10^3/UL (ref 1–4.8)
LYMPHOCYTES NFR BLD AUTO: 21 % (ref 24–48)
MCH RBC QN AUTO: 31 PG (ref 25–35)
MCHC RBC AUTO-ENTMCNC: 34 G/DL (ref 31–37)
MCV RBC AUTO: 90 FL (ref 79–100)
MONO #: 0.5 X10^3/UL (ref 0–1.1)
MONOCYTES NFR BLD: 10 % (ref 0–9)
NEUT #: 3.2 X10^3/UL (ref 1.8–7.7)
NEUTROPHILS NFR BLD AUTO: 66 % (ref 31–73)
PLATELET # BLD AUTO: 172 X10^3/UL (ref 140–400)
POTASSIUM SERPL-SCNC: 3.9 MMOL/L (ref 3.5–5.1)
PROTHROMBIN TIME: 13.8 SEC (ref 11.7–14)
RBC # BLD AUTO: 4.28 X10^6/UL (ref 4.3–5.7)
SODIUM SERPL-SCNC: 140 MMOL/L (ref 136–145)
WBC # BLD AUTO: 4.8 X10^3/UL (ref 4–11)

## 2019-12-27 PROCEDURE — 0QB13ZX EXCISION OF SACRUM, PERCUTANEOUS APPROACH, DIAGNOSTIC: ICD-10-PCS | Performed by: RADIOLOGY

## 2019-12-27 RX ADMIN — OXYCODONE HYDROCHLORIDE AND ACETAMINOPHEN SCH MG: 500 TABLET ORAL at 20:53

## 2019-12-27 RX ADMIN — FAMOTIDINE SCH MG: 20 TABLET ORAL at 20:53

## 2019-12-27 RX ADMIN — MESALAMINE SCH MG: 400 CAPSULE, DELAYED RELEASE ORAL at 09:00

## 2019-12-27 RX ADMIN — PIPERACILLIN SODIUM AND TAZOBACTAM SODIUM SCH MLS/HR: 3; .375 INJECTION, POWDER, LYOPHILIZED, FOR SOLUTION INTRAVENOUS at 13:50

## 2019-12-27 RX ADMIN — MESALAMINE SCH MG: 400 CAPSULE, DELAYED RELEASE ORAL at 13:48

## 2019-12-27 RX ADMIN — PIPERACILLIN SODIUM AND TAZOBACTAM SODIUM SCH MLS/HR: 3; .375 INJECTION, POWDER, LYOPHILIZED, FOR SOLUTION INTRAVENOUS at 17:31

## 2019-12-27 RX ADMIN — MESALAMINE SCH MG: 400 CAPSULE, DELAYED RELEASE ORAL at 20:53

## 2019-12-27 RX ADMIN — OXYCODONE HYDROCHLORIDE AND ACETAMINOPHEN SCH MG: 500 TABLET ORAL at 09:00

## 2019-12-27 RX ADMIN — VANCOMYCIN HYDROCHLORIDE PRN EACH: 1 INJECTION, POWDER, LYOPHILIZED, FOR SOLUTION INTRAVENOUS at 16:04

## 2019-12-27 RX ADMIN — PIPERACILLIN SODIUM AND TAZOBACTAM SODIUM SCH MLS/HR: 3; .375 INJECTION, POWDER, LYOPHILIZED, FOR SOLUTION INTRAVENOUS at 23:39

## 2019-12-27 NOTE — PDOC
BRIEF OPERATIVE NOTE


Pre-Op Diagnosis


decubitus ulcers


Post-Op Diagnosis


same


Procedure Performed


CT sacral biopsy


Surgeon


Casie


Anesthesia Type:  Conscious Sedation


Specimens Obtained


2 x 10g cores of distal sacrum near soft tissue infection


Findings


CT sacral biopsy


Complications


No immediate











GIL WEINSTEIN MD              Dec 27, 2019 10:49

## 2019-12-27 NOTE — PDOC
Infectious Disease Note


Subjective:


Subjective


pt doing ok


no f/c/n/v/d/abdo pain





Vital Signs:


Vital Signs





Vital Signs








  Date Time  Temp Pulse Resp B/P (MAP) Pulse Ox O2 Delivery O2 Flow Rate FiO2


 


19 10:57   18  98 Nasal Cannula 2.0 


 


19 10:52  78      


 


19 07:00 97.5   111/63 (79)    





 97.5       











Physical Exam:


PHYSICAL EXAM


GENERAL:  The patient is propped up in bed, alert, in no apparent distress.


HEENT:  Pupils equally round.  Oropharynx pink and moist.


NECK:  Supple, no lymphadenopathy.


LUNGS:  Clear to auscultation.


HEART:  S1, S2.


ABDOMEN:  Obese, soft, nontender with bowel sounds present.  Multiple scars and


right-sided ostomy.


EXTREMITIES:  No gross edema or cyanosis.


SKIN:  Warm to touch without signs of rash.  He has several sacrococcygeal


fistulas bleeding and packed with Nu Gauze.  No overt signs of infection.


NEUROLOGIC:  Alert.  Answering questions appropriately.





Medications:


Inpatient Meds:





Current Medications








 Medications


  (Trade)  Dose


 Ordered  Sig/Lisa  Start Time


 Stop Time Status Last Admin


Dose Admin


 


 Acetaminophen


  (Tylenol)  500 mg  PRN Q6HRS  PRN  19 08:30


     





 


 Acetaminophen/


 Codeine Phosphate


  (Tylenol #3)  1 tab  PRN Q6HRS  PRN  19 08:30


     





 


 Ascorbic Acid


  (Vitamin C)  500 mg  BID  19 21:00


    19 20:42


500 MG


 


 Clindamycin HCl


  (Cleocin)  300 mg  TID  19 14:00


 19 09:30 DC 19 08:39


300 MG


 


 Diphenhydramine


 HCl


  (Benadryl)  25 mg  PRN QHS  PRN  19 09:30


     





 


 Ergocalciferol


  (Vitamin D2)  50,000 unit  QMONTH  20 09:00


     





 


 Famotidine


  (Pepcid)  20 mg  HS  19 21:00


    19 20:42


20 MG


 


 Fentanyl Citrate


  (Fentanyl 2ml


 Vial)  100 mcg  1X  ONCE  19 11:00


 19 11:01 DC 19 10:57


50 MCG


 


 Guaifenesin


  (Robitussin Dm)  5 ml  PRN QID  PRN  19 14:00


     





 


 Iohexol


  (Omnipaque 300


 Mg/ml)  75 ml  1X  ONCE  19 08:00


 19 08:01 DC 19 08:29


75 ML


 


 Lidocaine HCl


  (Buffered


 Lidocaine 1%)  3 ml  1X  ONCE  19 11:00


 19 11:01 DC 19 10:58


4.5 ML


 


 Magnesium


 Hydroxide


  (Milk Of


 Magnesia)  2,400 mg  PRN DAILY  PRN  19 14:00


     





 


 Mesalamine


  (Delzicol)  800 mg  TID  19 15:00


    19 20:42


800 MG


 


 Midazolam HCl


  (Versed)  2 mg  1X  ONCE  19 11:00


 19 11:01 DC 19 10:57


1 MG


 


 Morphine Sulfate


  (Morphine


 Sulfate)  2 mg  PRN Q2HR  PRN  19 08:30


     





 


 Non-Formulary


 Medication


  (Protein


 Supplement


  (Nutritional


 Drink Mix))  420 gm  DAILY  19 09:00


   UNV  





 


 Ondansetron HCl


  (Zofran)  4 mg  PRN Q6HRS  PRN  19 09:30


     





 


 Oxycodone/


 Acetaminophen


  (Percocet 5/325)  1 tab  PRN Q4HRS  PRN  19 08:30


    19 16:37


1 TAB











Labs:


Lab





Laboratory Tests








Test


 19


04:55


 


White Blood Count


 4.8 x10^3/uL


(4.0-11.0)


 


Red Blood Count


 4.28 x10^6/uL


(4.30-5.70)


 


Hemoglobin


 13.1 g/dL


(13.0-17.5)


 


Hematocrit


 38.6 %


(39.0-53.0)


 


Mean Corpuscular Volume 90 fL () 


 


Mean Corpuscular Hemoglobin 31 pg (25-35) 


 


Mean Corpuscular Hemoglobin


Concent 34 g/dL


(31-37)


 


Red Cell Distribution Width


 13.6 %


(11.5-14.5)


 


Platelet Count


 172 x10^3/uL


(140-400)


 


Neutrophils (%) (Auto) 66 % (31-73) 


 


Lymphocytes (%) (Auto) 21 % (24-48) 


 


Monocytes (%) (Auto) 10 % (0-9) 


 


Eosinophils (%) (Auto) 3 % (0-3) 


 


Basophils (%) (Auto) 1 % (0-3) 


 


Neutrophils # (Auto)


 3.2 x10^3/uL


(1.8-7.7)


 


Lymphocytes # (Auto)


 1.0 x10^3/uL


(1.0-4.8)


 


Monocytes # (Auto)


 0.5 x10^3/uL


(0.0-1.1)


 


Eosinophils # (Auto)


 0.1 x10^3/uL


(0.0-0.7)


 


Basophils # (Auto)


 0.0 x10^3/uL


(0.0-0.2)


 


Prothrombin Time


 13.8 SEC


(11.7-14.0)


 


Prothromb Time International


Ratio 1.1 (0.8-1.1) 





 


Sodium Level


 140 mmol/L


(136-145)


 


Potassium Level


 3.9 mmol/L


(3.5-5.1)


 


Chloride Level


 106 mmol/L


()


 


Carbon Dioxide Level


 28 mmol/L


(21-32)


 


Anion Gap 6 (6-14) 


 


Blood Urea Nitrogen


 14 mg/dL


(8-26)


 


Creatinine


 1.0 mg/dL


(0.7-1.3)


 


Estimated GFR


(Cockcroft-Gault) 76.2 





 


Glucose Level


 104 mg/dL


(70-99)


 


Calcium Level


 9.0 mg/dL


(8.5-10.1)








Micro





--------------------

------------------------------------------------------------------------





RUN DATE: 19                  [facility] *LIVE*               

  PAGE 1   


RUN TIME: 8124                            Specimen Inquiry                    


----------

--------------------------------------------------------------------------------


--





PATIENT: ANIVAL DOWD                     ACCT: [id number] LOC:  [address] [place] [address]      

U: [id number]


                                       AGE/SX: 59/M         ROOM: [facility]

19


REG DR:  RANDALL PAULSON III, DO          :    1960   BED:  1          

DIS:         


                                       STATUS: ADM IN       TLOC:           


----------------------------------------------------------------------------

----------------








SPEC #: [id number]       TENZIN:      STATUS:  RES            REQ 

#: [id number]


                            RECD: 19-     SUBM DR: RANDALL PAULSON III, DO          


SOURCE: [facility]              ENTR:      MELVIN DR: DONIS GARCÍA DO          


SPDESC: WOUND                                                                   

          


ORDERED:  ANAER/AEROB/JEREMY                                                        

 


COMMENTS: SACRAL WOUND                                                


 

--------------------------------------------------------------------------------


------------





  Procedure                         Result                                      

         


------------------------------------------

--------------------------------------------------





  ANAEROBIC-AEROBIC CULTURE  


                                PENDING





  ANAEROBIC RES 1  


                                PENDING





  AEROBIC CULT  Preliminary  


        Preliminary report





  AEROBIC RES 1  Preliminary  


        Mixed site claudia.





        2+





  GRAM STAIN  Final  


        Final report





  GRAM STAIN RES 1  Final  


        Comment





        No white blood cells seen.





  GRAM STAIN RES 2  Final  


        No organisms seen





        Performed at:   - [facility]


        [address], [place], [place]  [id number]


        : YAW Vidales MD, Phone:  [phone]





 

--------------------------------------------------------------------------------


------------

















                                   ** END OF REPORT **





Objective:


Assessment:


 


1.  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


2.  Crohn's disease with history of ostomy and multiple fistula repairs.


3.  Acute and chronic OM changes on CT abdomen and pelvis ,Pt has been treated 

with previous OM at [facility] in the past . Chronic OM could be from the same


ESR 35


S/P IR biopsy 


4. Gastroesophageal reflux disease.


4.  Obesity.


5.  Kidney stone on CT Abdomen





Plan:


Plan of Care


start zosyn and vanco


monitor renal functions closely


f/u ID cultures


will need I and D of acute om, antibiotics alone will not be optimal


transfer to [facility] DR Otero for I and D, ostectomy and possible flap if eligible


Detailed discussion done with pt ,he is agreeable


D/W Dr Sanchez


Gen surgery plans noted











ALEXANDER VAIL MD           Dec 27, 2019 11:40

## 2019-12-27 NOTE — NUR
SW initiated [facility] transfer per Physician request but pt is declined. [facility] reported pt will need 
f/u as OP.

## 2019-12-27 NOTE — PDOC
SURGICAL PROGRESS NOTE


Subjective


Pt slept well, no obvious pain


Vital Signs





Vital Signs








  Date Time  Temp Pulse Resp B/P (MAP) Pulse Ox O2 Delivery O2 Flow Rate FiO2


 


12/27/19 03:00 97.9 60 18 120/70 (87) 96   





 97.9       


 


12/26/19 20:00      Room Air  








I&O











Intake and Output 


 


 12/27/19





 07:00


 


Intake Total 500 ml


 


Balance 500 ml


 


 


 


Intake Oral 500 ml


 


# Voids 6


 


# Bowel Movements 1








General:  Alert, Oriented X3, Cooperative, No acute distress


Skin:  Other (dressing intact)


Labs





Laboratory Tests








Test


 12/26/19


03:42 12/27/19


04:55


 


White Blood Count


 5.1 x10^3/uL


(4.0-11.0) 4.8 x10^3/uL


(4.0-11.0)


 


Red Blood Count


 4.23 x10^6/uL


(4.30-5.70) 4.28 x10^6/uL


(4.30-5.70)


 


Hemoglobin


 12.9 g/dL


(13.0-17.5) 13.1 g/dL


(13.0-17.5)


 


Hematocrit


 38.2 %


(39.0-53.0) 38.6 %


(39.0-53.0)


 


Mean Corpuscular Volume 90 fL ()  90 fL () 


 


Mean Corpuscular Hemoglobin 31 pg (25-35)  31 pg (25-35) 


 


Mean Corpuscular Hemoglobin


Concent 34 g/dL


(31-37) 34 g/dL


(31-37)


 


Red Cell Distribution Width


 13.5 %


(11.5-14.5) 13.6 %


(11.5-14.5)


 


Platelet Count


 164 x10^3/uL


(140-400) 172 x10^3/uL


(140-400)


 


Neutrophils (%) (Auto) 62 % (31-73)  66 % (31-73) 


 


Lymphocytes (%) (Auto) 21 % (24-48)  21 % (24-48) 


 


Monocytes (%) (Auto) 13 % (0-9)  10 % (0-9) 


 


Eosinophils (%) (Auto) 3 % (0-3)  3 % (0-3) 


 


Basophils (%) (Auto) 1 % (0-3)  1 % (0-3) 


 


Neutrophils # (Auto)


 3.2 x10^3/uL


(1.8-7.7) 3.2 x10^3/uL


(1.8-7.7)


 


Lymphocytes # (Auto)


 1.1 x10^3/uL


(1.0-4.8) 1.0 x10^3/uL


(1.0-4.8)


 


Monocytes # (Auto)


 0.6 x10^3/uL


(0.0-1.1) 0.5 x10^3/uL


(0.0-1.1)


 


Eosinophils # (Auto)


 0.2 x10^3/uL


(0.0-0.7) 0.1 x10^3/uL


(0.0-0.7)


 


Basophils # (Auto)


 0.0 x10^3/uL


(0.0-0.2) 0.0 x10^3/uL


(0.0-0.2)


 


Sodium Level


 142 mmol/L


(136-145) 140 mmol/L


(136-145)


 


Potassium Level


 4.0 mmol/L


(3.5-5.1) 3.9 mmol/L


(3.5-5.1)


 


Chloride Level


 105 mmol/L


() 106 mmol/L


()


 


Carbon Dioxide Level


 27 mmol/L


(21-32) 28 mmol/L


(21-32)


 


Anion Gap 10 (6-14)  6 (6-14) 


 


Blood Urea Nitrogen


 18 mg/dL


(8-26) 14 mg/dL


(8-26)


 


Creatinine


 1.0 mg/dL


(0.7-1.3) 1.0 mg/dL


(0.7-1.3)


 


Estimated GFR


(Cockcroft-Gault) 76.2 


 76.2 





 


Glucose Level


 102 mg/dL


(70-99) 104 mg/dL


(70-99)


 


Calcium Level


 8.4 mg/dL


(8.5-10.1) 9.0 mg/dL


(8.5-10.1)


 


Prothrombin Time


 


 13.8 SEC


(11.7-14.0)


 


Prothromb Time International


Ratio 


 1.1 (0.8-1.1) 











Laboratory Tests








Test


 12/27/19


04:55


 


White Blood Count


 4.8 x10^3/uL


(4.0-11.0)


 


Red Blood Count


 4.28 x10^6/uL


(4.30-5.70)


 


Hemoglobin


 13.1 g/dL


(13.0-17.5)


 


Hematocrit


 38.6 %


(39.0-53.0)


 


Mean Corpuscular Volume 90 fL () 


 


Mean Corpuscular Hemoglobin 31 pg (25-35) 


 


Mean Corpuscular Hemoglobin


Concent 34 g/dL


(31-37)


 


Red Cell Distribution Width


 13.6 %


(11.5-14.5)


 


Platelet Count


 172 x10^3/uL


(140-400)


 


Neutrophils (%) (Auto) 66 % (31-73) 


 


Lymphocytes (%) (Auto) 21 % (24-48) 


 


Monocytes (%) (Auto) 10 % (0-9) 


 


Eosinophils (%) (Auto) 3 % (0-3) 


 


Basophils (%) (Auto) 1 % (0-3) 


 


Neutrophils # (Auto)


 3.2 x10^3/uL


(1.8-7.7)


 


Lymphocytes # (Auto)


 1.0 x10^3/uL


(1.0-4.8)


 


Monocytes # (Auto)


 0.5 x10^3/uL


(0.0-1.1)


 


Eosinophils # (Auto)


 0.1 x10^3/uL


(0.0-0.7)


 


Basophils # (Auto)


 0.0 x10^3/uL


(0.0-0.2)


 


Prothrombin Time


 13.8 SEC


(11.7-14.0)


 


Prothromb Time International


Ratio 1.1 (0.8-1.1) 





 


Sodium Level


 140 mmol/L


(136-145)


 


Potassium Level


 3.9 mmol/L


(3.5-5.1)


 


Chloride Level


 106 mmol/L


()


 


Carbon Dioxide Level


 28 mmol/L


(21-32)


 


Anion Gap 6 (6-14) 


 


Blood Urea Nitrogen


 14 mg/dL


(8-26)


 


Creatinine


 1.0 mg/dL


(0.7-1.3)


 


Estimated GFR


(Cockcroft-Gault) 76.2 





 


Glucose Level


 104 mg/dL


(70-99)


 


Calcium Level


 9.0 mg/dL


(8.5-10.1)








Problem List


perineal ulcer with osteomyelitis


d/w wound care yesterday, osteo presents challenges on long term healing and 

consideration of surgery


agree with bone bx and plan initially per ORI LAWSON MD             Dec 27, 2019 08:38

## 2019-12-27 NOTE — PDOC
Provider Note


Provider Note


Talked to [facility]< DOes not meet inpt criteria for transfer.


Updated ID and SW











JENIFER VIDALES MD           Dec 27, 2019 12:42

## 2019-12-27 NOTE — PDOC
MODERATE SEDATION ASSESSMENT


RISKS/ALTERNATIVES


Risks/Alternatives


Risks and alternatives of this type of sedation and procedure discussed with:


RISK/ALTERNATIVES:  Patient





H & P ON CHART


H & P


H & P on chart and reviewed for co-morbid conditions and appropriate labs.


H&P ON CHART:  Yes





PREGNANCY STATUS


PREG STATUS ASSESSED:  Yes





MEDS/ALLERGIES REVIEWED


Meds/Allergies Reviewed


Medications and Allergies including time and route of recently administered 

narcotics and sedatives.


MEDS/ALLERGIES REVIEWED:  Yes





ASA RATING


ASA RATING:  II





AIRWAY ASSESSMENT


Airway Assessment


Airway patency, oral function limitations, presence  of caps, crowns, dentures, 

partials, and ability to extend neck assessed.


AIRWAY ASSESSMENT:  Yes





MALLAMPATI SCORE


MALLAMPATI SCORE:  II





PRE-SEDATION ASSESSMENT


PRE-SEDATION ASSESSMENT:  Yes











GIL WEINSTEIN MD              Dec 27, 2019 10:48

## 2019-12-27 NOTE — PDOC
PROGRESS NOTES


Chief Complaint


Chief Complaint


Perianal wound - with bleeding NON SURGICAL /MED mx


Severe Crohn's with multiple surgeries s/p ostomy (multiple fissures and 

abdominal wounds)


Chronic pain


Hyperlipidemia


Hypertension


GERD


Incidenatl 5 mm rt kidney stone


HIatal hernia with small bowel loops - known to him





History of Present Illness


History of Present Illness


RECtal wounds inspected, erythema round but no induration, 


TRial off abx per ID


GS recs IV abx prior to sx - which we are unsure if plastic sx is needed (flap 

etc)


I have read multiple times GS and ID notes


Pt has a rt peripheral line


ID recs [facility] transfer - i m coordinating with SW





5 coccygeal wounds, wound care on board


Pt asks me about PICC and IV abx


Pt does not want sx currently


INterested in HH in current living situations


SW on case








NO fevers





Ambulatory,. non toxic appearing


HX crohns for yrs now


[place]ives in [place] and has HH wound care


Incidental 5 mm RT kidney stone, no pains





PLAN:


Attempting [facility] transfer


GS mentions poor surgical candidate


MIght need plastics for flap sx?


WIll see what [facility] has to offer


Dw ID and reached out to  mid level


Pt can go either way, transfer or no transfer, he is aware of plan





signif time with this pt





Vitals


Vitals





Vital Signs








  Date Time  Temp Pulse Resp B/P (MAP) Pulse Ox O2 Delivery O2 Flow Rate FiO2


 


12/27/19 10:57   18  98 Nasal Cannula 2.0 


 


12/27/19 10:52  78      


 


12/27/19 07:00 97.5   111/63 (79)    





 97.5       











Physical Exam


Physical Exam


GENERAL:  The patient is propped up in bed, alert, in no apparent distress.


HEENT:  Pupils equally round.  Oropharynx pink and moist.


NECK:  Supple, no lymphadenopathy.


LUNGS:  Clear to auscultation.


HEART:  S1, S2.


ABDOMEN:  Obese, soft, nontender with bowel sounds present.  Multiple scars and


right-sided ostomy.


EXTREMITIES:  No gross edema or cyanosis.


SKIN:  Warm to touch without signs of rash.  He has several sacrococcygeal


fistulas bleeding and packed with Nu Gauze.  No overt signs of infection.


NEUROLOGIC:  Alert.  Answering questions appropriately.


General:  Alert, Oriented X3, Cooperative, No acute distress


Heart:  Regular rate, Normal S1, Normal S2


Lungs:  Clear


Abdomen:  Soft, No tenderness


Extremities:  No clubbing, No cyanosis


Skin:  Other (dressing intact)





Labs


LABS





Laboratory Tests








Test


 12/27/19


04:55


 


White Blood Count


 4.8 x10^3/uL


(4.0-11.0)


 


Red Blood Count


 4.28 x10^6/uL


(4.30-5.70)


 


Hemoglobin


 13.1 g/dL


(13.0-17.5)


 


Hematocrit


 38.6 %


(39.0-53.0)


 


Mean Corpuscular Volume 90 fL () 


 


Mean Corpuscular Hemoglobin 31 pg (25-35) 


 


Mean Corpuscular Hemoglobin


Concent 34 g/dL


(31-37)


 


Red Cell Distribution Width


 13.6 %


(11.5-14.5)


 


Platelet Count


 172 x10^3/uL


(140-400)


 


Neutrophils (%) (Auto) 66 % (31-73) 


 


Lymphocytes (%) (Auto) 21 % (24-48) 


 


Monocytes (%) (Auto) 10 % (0-9) 


 


Eosinophils (%) (Auto) 3 % (0-3) 


 


Basophils (%) (Auto) 1 % (0-3) 


 


Neutrophils # (Auto)


 3.2 x10^3/uL


(1.8-7.7)


 


Lymphocytes # (Auto)


 1.0 x10^3/uL


(1.0-4.8)


 


Monocytes # (Auto)


 0.5 x10^3/uL


(0.0-1.1)


 


Eosinophils # (Auto)


 0.1 x10^3/uL


(0.0-0.7)


 


Basophils # (Auto)


 0.0 x10^3/uL


(0.0-0.2)


 


Prothrombin Time


 13.8 SEC


(11.7-14.0)


 


Prothromb Time International


Ratio 1.1 (0.8-1.1) 





 


Sodium Level


 140 mmol/L


(136-145)


 


Potassium Level


 3.9 mmol/L


(3.5-5.1)


 


Chloride Level


 106 mmol/L


()


 


Carbon Dioxide Level


 28 mmol/L


(21-32)


 


Anion Gap 6 (6-14) 


 


Blood Urea Nitrogen


 14 mg/dL


(8-26)


 


Creatinine


 1.0 mg/dL


(0.7-1.3)


 


Estimated GFR


(Cockcroft-Gault) 76.2 





 


Glucose Level


 104 mg/dL


(70-99)


 


Calcium Level


 9.0 mg/dL


(8.5-10.1)











Review of Systems


Review of Systems


no complaints





Comment


Review of Relevant


I have reviewed the following items crow (where applicable) has been applied.


Labs





Laboratory Tests








Test


 12/26/19


03:42 12/27/19


04:55


 


White Blood Count


 5.1 x10^3/uL


(4.0-11.0) 4.8 x10^3/uL


(4.0-11.0)


 


Red Blood Count


 4.23 x10^6/uL


(4.30-5.70) 4.28 x10^6/uL


(4.30-5.70)


 


Hemoglobin


 12.9 g/dL


(13.0-17.5) 13.1 g/dL


(13.0-17.5)


 


Hematocrit


 38.2 %


(39.0-53.0) 38.6 %


(39.0-53.0)


 


Mean Corpuscular Volume 90 fL ()  90 fL () 


 


Mean Corpuscular Hemoglobin 31 pg (25-35)  31 pg (25-35) 


 


Mean Corpuscular Hemoglobin


Concent 34 g/dL


(31-37) 34 g/dL


(31-37)


 


Red Cell Distribution Width


 13.5 %


(11.5-14.5) 13.6 %


(11.5-14.5)


 


Platelet Count


 164 x10^3/uL


(140-400) 172 x10^3/uL


(140-400)


 


Neutrophils (%) (Auto) 62 % (31-73)  66 % (31-73) 


 


Lymphocytes (%) (Auto) 21 % (24-48)  21 % (24-48) 


 


Monocytes (%) (Auto) 13 % (0-9)  10 % (0-9) 


 


Eosinophils (%) (Auto) 3 % (0-3)  3 % (0-3) 


 


Basophils (%) (Auto) 1 % (0-3)  1 % (0-3) 


 


Neutrophils # (Auto)


 3.2 x10^3/uL


(1.8-7.7) 3.2 x10^3/uL


(1.8-7.7)


 


Lymphocytes # (Auto)


 1.1 x10^3/uL


(1.0-4.8) 1.0 x10^3/uL


(1.0-4.8)


 


Monocytes # (Auto)


 0.6 x10^3/uL


(0.0-1.1) 0.5 x10^3/uL


(0.0-1.1)


 


Eosinophils # (Auto)


 0.2 x10^3/uL


(0.0-0.7) 0.1 x10^3/uL


(0.0-0.7)


 


Basophils # (Auto)


 0.0 x10^3/uL


(0.0-0.2) 0.0 x10^3/uL


(0.0-0.2)


 


Sodium Level


 142 mmol/L


(136-145) 140 mmol/L


(136-145)


 


Potassium Level


 4.0 mmol/L


(3.5-5.1) 3.9 mmol/L


(3.5-5.1)


 


Chloride Level


 105 mmol/L


() 106 mmol/L


()


 


Carbon Dioxide Level


 27 mmol/L


(21-32) 28 mmol/L


(21-32)


 


Anion Gap 10 (6-14)  6 (6-14) 


 


Blood Urea Nitrogen


 18 mg/dL


(8-26) 14 mg/dL


(8-26)


 


Creatinine


 1.0 mg/dL


(0.7-1.3) 1.0 mg/dL


(0.7-1.3)


 


Estimated GFR


(Cockcroft-Gault) 76.2 


 76.2 





 


Glucose Level


 102 mg/dL


(70-99) 104 mg/dL


(70-99)


 


Calcium Level


 8.4 mg/dL


(8.5-10.1) 9.0 mg/dL


(8.5-10.1)


 


Prothrombin Time


 


 13.8 SEC


(11.7-14.0)


 


Prothromb Time International


Ratio 


 1.1 (0.8-1.1) 











Laboratory Tests








Test


 12/27/19


04:55


 


White Blood Count


 4.8 x10^3/uL


(4.0-11.0)


 


Red Blood Count


 4.28 x10^6/uL


(4.30-5.70)


 


Hemoglobin


 13.1 g/dL


(13.0-17.5)


 


Hematocrit


 38.6 %


(39.0-53.0)


 


Mean Corpuscular Volume 90 fL () 


 


Mean Corpuscular Hemoglobin 31 pg (25-35) 


 


Mean Corpuscular Hemoglobin


Concent 34 g/dL


(31-37)


 


Red Cell Distribution Width


 13.6 %


(11.5-14.5)


 


Platelet Count


 172 x10^3/uL


(140-400)


 


Neutrophils (%) (Auto) 66 % (31-73) 


 


Lymphocytes (%) (Auto) 21 % (24-48) 


 


Monocytes (%) (Auto) 10 % (0-9) 


 


Eosinophils (%) (Auto) 3 % (0-3) 


 


Basophils (%) (Auto) 1 % (0-3) 


 


Neutrophils # (Auto)


 3.2 x10^3/uL


(1.8-7.7)


 


Lymphocytes # (Auto)


 1.0 x10^3/uL


(1.0-4.8)


 


Monocytes # (Auto)


 0.5 x10^3/uL


(0.0-1.1)


 


Eosinophils # (Auto)


 0.1 x10^3/uL


(0.0-0.7)


 


Basophils # (Auto)


 0.0 x10^3/uL


(0.0-0.2)


 


Prothrombin Time


 13.8 SEC


(11.7-14.0)


 


Prothromb Time International


Ratio 1.1 (0.8-1.1) 





 


Sodium Level


 140 mmol/L


(136-145)


 


Potassium Level


 3.9 mmol/L


(3.5-5.1)


 


Chloride Level


 106 mmol/L


()


 


Carbon Dioxide Level


 28 mmol/L


(21-32)


 


Anion Gap 6 (6-14) 


 


Blood Urea Nitrogen


 14 mg/dL


(8-26)


 


Creatinine


 1.0 mg/dL


(0.7-1.3)


 


Estimated GFR


(Cockcroft-Gault) 76.2 





 


Glucose Level


 104 mg/dL


(70-99)


 


Calcium Level


 9.0 mg/dL


(8.5-10.1)








Microbiology


12/23/19 Anaerobic/Aerobic Culture, Resulted


           Pending


12/23/19 Anaerobic Culture Result 1 ([facility]), Resulted


           Pending


12/23/19 Aerobic Culture - Final, Resulted


           


12/23/19 Aerobic Culture Result 1 ([facility]) - Final, Resulted


           


12/23/19 Gram Stain - Final, Resulted


           


12/23/19 Gram Stain Result 1 ([facility]) - Final, Resulted


           


12/23/19 Gram Stain Result 2 ([facility]) - Final, Resulted


Medications





Current Medications


Acetaminophen (Tylenol) 650 mg PRN Q4HRS  PRN PO PAIN;  Start 12/23/19 at 14:00;

 Stop 12/25/19 at 08:20;  Status DC


Ergocalciferol (Vitamin D2) 50,000 unit QMONTH PO ;  Start 1/22/20 at 09:00


Famotidine (Pepcid) 20 mg HS PO  Last administered on 12/26/19at 20:42;  Start 

12/23/19 at 21:00


Guaifenesin (Robitussin Dm) 5 ml PRN QID  PRN PO COUGH;  Start 12/23/19 at 14:00


Ascorbic Acid (Vitamin C) 500 mg BID PO  Last administered on 12/26/19at 20:42; 

Start 12/23/19 at 21:00


Clindamycin HCl (Cleocin) 300 mg TID PO  Last administered on 12/24/19at 08:39; 

Start 12/23/19 at 14:00;  Stop 12/24/19 at 09:30;  Status DC


Magnesium Hydroxide (Milk Of Magnesia) 2,400 mg PRN DAILY  PRN PO CONSTIPATION; 

Start 12/23/19 at 14:00


Mesalamine (Delzicol) 800 mg TID PO  Last administered on 12/26/19at 20:42;  

Start 12/23/19 at 15:00


Non-Formulary Medication (Protein Supplement (Nutritional Drink Mix)) 420 gm 

DAILY PO ;  Start 12/24/19 at 09:00;  Status UNV


Iohexol (Omnipaque 300 Mg/ml) 75 ml 1X  ONCE IV  Last administered on 12/24/19at

08:29;  Start 12/24/19 at 08:00;  Stop 12/24/19 at 08:01;  Status DC


Acetaminophen (Tylenol) 500 mg PRN Q6HRS  PRN PO MILD PAIN / TEMP;  Start 

12/25/19 at 08:30


Acetaminophen/ Codeine Phosphate (Tylenol #3) 1 tab PRN Q6HRS  PRN PO MODERATE 

PAIN;  Start 12/25/19 at 08:30


Oxycodone/ Acetaminophen (Percocet 5/325) 1 tab PRN Q4HRS  PRN PO SEVERE PAIN 

Last administered on 12/26/19at 16:37;  Start 12/25/19 at 08:30


Morphine Sulfate (Morphine Sulfate) 2 mg PRN Q2HR  PRN IV PAIN;  Start 12/25/19 

at 08:30


Ondansetron HCl (Zofran) 4 mg PRN Q6HRS  PRN IVP NAUSEA/VOMITING;  Start 

12/27/19 at 09:30


Diphenhydramine HCl (Benadryl) 25 mg PRN QHS  PRN PO INSOMNIA;  Start 12/27/19 

at 09:30


Lidocaine HCl (Buffered Lidocaine 1%) 3 ml STK-MED ONCE .ROUTE ;  Start 12/27/19

at 10:01;  Stop 12/27/19 at 10:02;  Status DC


Midazolam HCl (Versed) 2 mg STK-MED ONCE .ROUTE ;  Start 12/27/19 at 10:20;  

Stop 12/27/19 at 10:21;  Status DC


Fentanyl Citrate (Fentanyl 2ml Vial) 100 mcg STK-MED ONCE .ROUTE ;  Start 

12/27/19 at 10:20;  Stop 12/27/19 at 10:21;  Status DC


Lidocaine HCl (Buffered Lidocaine 1%) 3 ml 1X  ONCE IJ  Last administered on 

12/27/19at 10:58;  Start 12/27/19 at 11:00;  Stop 12/27/19 at 11:01;  Status DC


Midazolam HCl (Versed) 2 mg 1X  ONCE IV  Last administered on 12/27/19at 10:57; 

Start 12/27/19 at 11:00;  Stop 12/27/19 at 11:01;  Status DC


Fentanyl Citrate (Fentanyl 2ml Vial) 100 mcg 1X  ONCE IV  Last administered on 

12/27/19at 10:57;  Start 12/27/19 at 11:00;  Stop 12/27/19 at 11:01;  Status DC





Active Scripts


Active


Reported


Hydrocodone-Apap 5-325  ** (Hydrocodone Bit/Acetaminophen) 1 Tab Tablet 1-2 Tab 

PO PRN Q4HRS PRN


Tussin Dm Cough & Chest Syrup (Guaifenesin/Dextromethorphan) 118 Ml Syrup 5 Ml 

PO PRN QID PRN 12 Days


Milk Of Magnesia (Magnesium Hydroxide) 2,400 Mg/10 Ml Oral.susp 2,400 Mg PO PRN 

DAILY PRN


Tylenol (Acetaminophen) 325 Mg Tablet 650 Mg PO PRN Q4HRS PRN


Clindamycin Hcl 300 Mg Capsule 300 Mg PO TID


Vitamin D2 (Ergocalciferol (Vitamin D2)) 50,000 Unit Capsule 1 Cap PO QMONTH


Vitamin C (Ascorbate Calcium) 500 Mg Tablet 500 Mg PO BID


Nutritional Drink Mix (Protein Supplement) 420 Gm Powder 420 Gm PO DAILY


Multi-Vitamin Daily (Multivitamin) 1 Each Tablet 1 Each PO 


Famotidine 20 Mg Tablet 20 Mg PO HS


Asacol Hd (Mesalamine) 800 Mg Tablet.dr 1,600 Mg PO TID


Vitals/I & O





Vital Sign - Last 24 Hours








 12/26/19 12/26/19 12/26/19 12/26/19





 15:00 16:37 17:45 19:00


 


Temp 97.9   98.0





 97.9   98.0


 


Pulse 73   68


 


Resp 18   20


 


B/P (MAP) 114/71 (85)   120/74 (89)


 


Pulse Ox 95   97


 


O2 Delivery Room Air Room Air Room Air 


 


    





    





 12/26/19 12/26/19 12/27/19 12/27/19





 20:00 23:00 03:00 07:00


 


Temp  98.0 97.9 97.5





  98.0 97.9 97.5


 


Pulse  58 60 65


 


Resp  16 18 17


 


B/P (MAP)  114/63 (80) 120/70 (87) 111/63 (79)


 


Pulse Ox  96 96 95


 


O2 Delivery Room Air   Room Air


 


    





    





 12/27/19 12/27/19 12/27/19 12/27/19





 10:32 10:40 10:52 10:57


 


Pulse 76 73 78 


 


Resp 16 14 18 18


 


Pulse Ox   98 98


 


O2 Delivery  Nasal Cannula Nasal Cannula Nasal Cannula


 


O2 Flow Rate 2.0 2.0 2.0 2.0














Intake and Output   


 


 12/26/19 12/26/19 12/27/19





 15:00 23:00 07:00


 


Intake Total  500 ml 


 


Balance  500 ml 

















JENIFER VIDALES MD           Dec 27, 2019 12:04

## 2019-12-27 NOTE — RAD
Procedure: CT-guided biopsy of the sacrum



Clinical Indication: Adult male with severe decubitus ulcers, sacral ostial

lysis, suspected osteomyelitis.



Sedation: Conscious sedation was administered with a total intraprocedural

face-to-face time of 12 minutes.  The patient was monitored by a qualified

independent observer throughout the time of sedation.  Please refer to the

medical record for exact doses of medications utilized to achieve moderate

sedation. 



Antibiotics: None



Sterility: The procedure was performed in its entirety using appropriate

elements of sterile technique. 



Consent: 

The procedure was explained in its entirety to the patient or the patients

designated representative by a member of the treatment team, including a

discussion of the risks, benefits and commonly accepted alternatives to the

procedure, as well as the expected consequences of no therapy whatsoever.

Discussion of the risks included, but was not limited to, those that are most

frequent and those that are rare but possibly severe or life-threatening, as

well as the possibility of unforeseen complications.



Technique and Findings: Following informed consent, the patient was prepped

and draped in usual sterile fashion. Preliminary CT scan of the area of

interest was performed. 1% lidocaine was used to achieve local anesthesia. A

small dermatotomy was made. Under periodic CT surveillance, a 10-gauge

automated bone biopsy needle was advanced into the distal sacrum. The needle

was removed and the specimen was preserved in formalin. A second pass through

the sacrum was then performed, with a second specimen preserved in a sterile

container for microbiologic analysis. Hemostasis was readily achieved with

manual compression.



Complications: No immediate



Impression:

1. CT-guided biopsy of the sacrum as described. 





PQRS Compliance Statement:



One or more of the following individualized dose reduction techniques were

utilized for this examination:

1. Automated exposure control

2. Adjustment of the mA and/or kV according to patient size

3. Use of iterative reconstruction technique

## 2019-12-28 VITALS — DIASTOLIC BLOOD PRESSURE: 54 MMHG | SYSTOLIC BLOOD PRESSURE: 107 MMHG

## 2019-12-28 VITALS — DIASTOLIC BLOOD PRESSURE: 46 MMHG | SYSTOLIC BLOOD PRESSURE: 100 MMHG

## 2019-12-28 VITALS — DIASTOLIC BLOOD PRESSURE: 54 MMHG | SYSTOLIC BLOOD PRESSURE: 93 MMHG

## 2019-12-28 VITALS — SYSTOLIC BLOOD PRESSURE: 91 MMHG | DIASTOLIC BLOOD PRESSURE: 39 MMHG

## 2019-12-28 VITALS — DIASTOLIC BLOOD PRESSURE: 71 MMHG | SYSTOLIC BLOOD PRESSURE: 132 MMHG

## 2019-12-28 VITALS — SYSTOLIC BLOOD PRESSURE: 111 MMHG | DIASTOLIC BLOOD PRESSURE: 57 MMHG

## 2019-12-28 LAB
ANION GAP SERPL CALC-SCNC: 13 MMOL/L (ref 6–14)
BASOPHILS # BLD AUTO: 0 X10^3/UL (ref 0–0.2)
BASOPHILS NFR BLD: 1 % (ref 0–3)
BUN SERPL-MCNC: 12 MG/DL (ref 8–26)
CALCIUM SERPL-MCNC: 8.2 MG/DL (ref 8.5–10.1)
CHLORIDE SERPL-SCNC: 106 MMOL/L (ref 98–107)
CO2 SERPL-SCNC: 23 MMOL/L (ref 21–32)
CREAT SERPL-MCNC: 1 MG/DL (ref 0.7–1.3)
EOSINOPHIL NFR BLD: 0.1 X10^3/UL (ref 0–0.7)
EOSINOPHIL NFR BLD: 2 % (ref 0–3)
ERYTHROCYTE [DISTWIDTH] IN BLOOD BY AUTOMATED COUNT: 13.5 % (ref 11.5–14.5)
GFR SERPLBLD BASED ON 1.73 SQ M-ARVRAT: 76.2 ML/MIN
GLUCOSE SERPL-MCNC: 108 MG/DL (ref 70–99)
HCT VFR BLD CALC: 38.9 % (ref 39–53)
HGB BLD-MCNC: 12.9 G/DL (ref 13–17.5)
LYMPHOCYTES # BLD: 1.1 X10^3/UL (ref 1–4.8)
LYMPHOCYTES NFR BLD AUTO: 20 % (ref 24–48)
MCH RBC QN AUTO: 30 PG (ref 25–35)
MCHC RBC AUTO-ENTMCNC: 33 G/DL (ref 31–37)
MCV RBC AUTO: 91 FL (ref 79–100)
MONO #: 0.6 X10^3/UL (ref 0–1.1)
MONOCYTES NFR BLD: 11 % (ref 0–9)
NEUT #: 3.7 X10^3/UL (ref 1.8–7.7)
NEUTROPHILS NFR BLD AUTO: 66 % (ref 31–73)
PLATELET # BLD AUTO: 166 X10^3/UL (ref 140–400)
POTASSIUM SERPL-SCNC: 3.9 MMOL/L (ref 3.5–5.1)
RBC # BLD AUTO: 4.29 X10^6/UL (ref 4.3–5.7)
SODIUM SERPL-SCNC: 142 MMOL/L (ref 136–145)
WBC # BLD AUTO: 5.6 X10^3/UL (ref 4–11)

## 2019-12-28 RX ADMIN — PIPERACILLIN SODIUM AND TAZOBACTAM SODIUM SCH MLS/HR: 3; .375 INJECTION, POWDER, LYOPHILIZED, FOR SOLUTION INTRAVENOUS at 12:47

## 2019-12-28 RX ADMIN — MESALAMINE SCH MG: 400 CAPSULE, DELAYED RELEASE ORAL at 09:02

## 2019-12-28 RX ADMIN — VANCOMYCIN HYDROCHLORIDE PRN EACH: 1 INJECTION, POWDER, LYOPHILIZED, FOR SOLUTION INTRAVENOUS at 10:37

## 2019-12-28 RX ADMIN — OXYCODONE HYDROCHLORIDE AND ACETAMINOPHEN SCH MG: 500 TABLET ORAL at 21:13

## 2019-12-28 RX ADMIN — VANCOMYCIN HYDROCHLORIDE PRN EACH: 1 INJECTION, POWDER, LYOPHILIZED, FOR SOLUTION INTRAVENOUS at 22:01

## 2019-12-28 RX ADMIN — OXYCODONE HYDROCHLORIDE AND ACETAMINOPHEN SCH MG: 500 TABLET ORAL at 09:02

## 2019-12-28 RX ADMIN — VANCOMYCIN HYDROCHLORIDE SCH MLS/HR: 1 INJECTION, POWDER, FOR SOLUTION INTRAVENOUS at 15:03

## 2019-12-28 RX ADMIN — MESALAMINE SCH MG: 400 CAPSULE, DELAYED RELEASE ORAL at 21:13

## 2019-12-28 RX ADMIN — MESALAMINE SCH MG: 400 CAPSULE, DELAYED RELEASE ORAL at 15:03

## 2019-12-28 RX ADMIN — VANCOMYCIN HYDROCHLORIDE SCH MLS/HR: 1 INJECTION, POWDER, FOR SOLUTION INTRAVENOUS at 02:31

## 2019-12-28 RX ADMIN — FAMOTIDINE SCH MG: 20 TABLET ORAL at 21:31

## 2019-12-28 RX ADMIN — PIPERACILLIN SODIUM AND TAZOBACTAM SODIUM SCH MLS/HR: 3; .375 INJECTION, POWDER, LYOPHILIZED, FOR SOLUTION INTRAVENOUS at 17:33

## 2019-12-28 RX ADMIN — PIPERACILLIN SODIUM AND TAZOBACTAM SODIUM SCH MLS/HR: 3; .375 INJECTION, POWDER, LYOPHILIZED, FOR SOLUTION INTRAVENOUS at 05:31

## 2019-12-28 RX ADMIN — Medication SCH CAP: at 21:13

## 2019-12-28 NOTE — PDOC
PROGRESS NOTES


Chief Complaint


Chief Complaint


Perianal wound - with bleeding NON SURGICAL /MED mx


Severe Crohn's with multiple surgeries s/p ostomy (multiple fissures and 

abdominal wounds)


Chronic pain


Hyperlipidemia


Hypertension


GERD


Incidental 5 mm rt kidney stone


HIatal hernia with small bowel loops - known to him





History of Present Illness


History of Present Illness


Ambulatory,. non toxic appearing


HX crohns for yrs now


[place]ives in [place] and has HH wound care





NO fevers, he is comfortable. Cultures with NGTD, but CT with osteomyelitis of 

sacrum concerning. No CP or SOB





PLAN:


Attempting [facility] transfer


GS mentions poor surgical candidate


D/w ID - will need I and D of acute om, antibiotics alone will not be optimal


Rec transfer to [facility] DR Otero for I and D, ostectomy and possible flap if cullen carpio - [facility] has denied transfer, requests outpatient. He is known to their 

service





Vitals


Vitals





Vital Signs








  Date Time  Temp Pulse Resp B/P (MAP) Pulse Ox O2 Delivery O2 Flow Rate FiO2


 


12/28/19 03:00 98.0 63 17 111/57 (75) 96 Room Air  





 98.0       


 


12/27/19 10:57       2.0 











Physical Exam


Physical Exam


GENERAL:  The patient is propped up in bed, alert, in no apparent distress.


HEENT:  Pupils equally round.  Oropharynx pink and moist.


NECK:  Supple, no lymphadenopathy.


LUNGS:  Clear to auscultation.


HEART:  S1, S2.


ABDOMEN:  Obese, soft, nontender with bowel sounds present.  Multiple scars and


right-sided ostomy.


EXTREMITIES:  No gross edema or cyanosis.


SKIN:  Warm to touch without signs of rash.  He has several sacrococcygeal


fistulas bleeding and packed with Nu Gauze.  No overt signs of infection.


NEUROLOGIC:  Alert.  Answering questions appropriately.


General:  Alert, Oriented X3, Cooperative, No acute distress


Heart:  Regular rate, Normal S1, Normal S2


Lungs:  Clear


Abdomen:  Soft, No tenderness


Extremities:  No clubbing, No cyanosis


Skin:  Other (dressing intact)





Labs


LABS





Laboratory Tests








Test


 12/28/19


05:00 12/28/19


06:30


 


Sodium Level


 142 mmol/L


(136-145) 





 


Potassium Level


 3.9 mmol/L


(3.5-5.1) 





 


Chloride Level


 106 mmol/L


() 





 


Carbon Dioxide Level


 23 mmol/L


(21-32) 





 


Anion Gap 13 (6-14)  


 


Blood Urea Nitrogen


 12 mg/dL


(8-26) 





 


Creatinine


 1.0 mg/dL


(0.7-1.3) 





 


Estimated GFR


(Cockcroft-Gault) 76.2 


 





 


Glucose Level


 108 mg/dL


(70-99) 





 


Calcium Level


 8.2 mg/dL


(8.5-10.1) 





 


White Blood Count


 


 5.6 x10^3/uL


(4.0-11.0)


 


Red Blood Count


 


 4.29 x10^6/uL


(4.30-5.70)


 


Hemoglobin


 


 12.9 g/dL


(13.0-17.5)


 


Hematocrit


 


 38.9 %


(39.0-53.0)


 


Mean Corpuscular Volume  91 fL () 


 


Mean Corpuscular Hemoglobin  30 pg (25-35) 


 


Mean Corpuscular Hemoglobin


Concent 


 33 g/dL


(31-37)


 


Red Cell Distribution Width


 


 13.5 %


(11.5-14.5)


 


Platelet Count


 


 166 x10^3/uL


(140-400)


 


Neutrophils (%) (Auto)  66 % (31-73) 


 


Lymphocytes (%) (Auto)  20 % (24-48) 


 


Monocytes (%) (Auto)  11 % (0-9) 


 


Eosinophils (%) (Auto)  2 % (0-3) 


 


Basophils (%) (Auto)  1 % (0-3) 


 


Neutrophils # (Auto)


 


 3.7 x10^3/uL


(1.8-7.7)


 


Lymphocytes # (Auto)


 


 1.1 x10^3/uL


(1.0-4.8)


 


Monocytes # (Auto)


 


 0.6 x10^3/uL


(0.0-1.1)


 


Eosinophils # (Auto)


 


 0.1 x10^3/uL


(0.0-0.7)


 


Basophils # (Auto)


 


 0.0 x10^3/uL


(0.0-0.2)











Comment


Review of Relevant


I have reviewed the following items crow (where applicable) has been applied.


Labs





Laboratory Tests








Test


 12/27/19


04:55 12/28/19


05:00 12/28/19


06:30


 


White Blood Count


 4.8 x10^3/uL


(4.0-11.0) 


 5.6 x10^3/uL


(4.0-11.0)


 


Red Blood Count


 4.28 x10^6/uL


(4.30-5.70) 


 4.29 x10^6/uL


(4.30-5.70)


 


Hemoglobin


 13.1 g/dL


(13.0-17.5) 


 12.9 g/dL


(13.0-17.5)


 


Hematocrit


 38.6 %


(39.0-53.0) 


 38.9 %


(39.0-53.0)


 


Mean Corpuscular Volume 90 fL ()   91 fL () 


 


Mean Corpuscular Hemoglobin 31 pg (25-35)   30 pg (25-35) 


 


Mean Corpuscular Hemoglobin


Concent 34 g/dL


(31-37) 


 33 g/dL


(31-37)


 


Red Cell Distribution Width


 13.6 %


(11.5-14.5) 


 13.5 %


(11.5-14.5)


 


Platelet Count


 172 x10^3/uL


(140-400) 


 166 x10^3/uL


(140-400)


 


Neutrophils (%) (Auto) 66 % (31-73)   66 % (31-73) 


 


Lymphocytes (%) (Auto) 21 % (24-48)   20 % (24-48) 


 


Monocytes (%) (Auto) 10 % (0-9)   11 % (0-9) 


 


Eosinophils (%) (Auto) 3 % (0-3)   2 % (0-3) 


 


Basophils (%) (Auto) 1 % (0-3)   1 % (0-3) 


 


Neutrophils # (Auto)


 3.2 x10^3/uL


(1.8-7.7) 


 3.7 x10^3/uL


(1.8-7.7)


 


Lymphocytes # (Auto)


 1.0 x10^3/uL


(1.0-4.8) 


 1.1 x10^3/uL


(1.0-4.8)


 


Monocytes # (Auto)


 0.5 x10^3/uL


(0.0-1.1) 


 0.6 x10^3/uL


(0.0-1.1)


 


Eosinophils # (Auto)


 0.1 x10^3/uL


(0.0-0.7) 


 0.1 x10^3/uL


(0.0-0.7)


 


Basophils # (Auto)


 0.0 x10^3/uL


(0.0-0.2) 


 0.0 x10^3/uL


(0.0-0.2)


 


Prothrombin Time


 13.8 SEC


(11.7-14.0) 


 





 


Prothromb Time International


Ratio 1.1 (0.8-1.1) 


 


 





 


Sodium Level


 140 mmol/L


(136-145) 142 mmol/L


(136-145) 





 


Potassium Level


 3.9 mmol/L


(3.5-5.1) 3.9 mmol/L


(3.5-5.1) 





 


Chloride Level


 106 mmol/L


() 106 mmol/L


() 





 


Carbon Dioxide Level


 28 mmol/L


(21-32) 23 mmol/L


(21-32) 





 


Anion Gap 6 (6-14)  13 (6-14)  


 


Blood Urea Nitrogen


 14 mg/dL


(8-26) 12 mg/dL


(8-26) 





 


Creatinine


 1.0 mg/dL


(0.7-1.3) 1.0 mg/dL


(0.7-1.3) 





 


Estimated GFR


(Cockcroft-Gault) 76.2 


 76.2 


 





 


Glucose Level


 104 mg/dL


(70-99) 108 mg/dL


(70-99) 





 


Calcium Level


 9.0 mg/dL


(8.5-10.1) 8.2 mg/dL


(8.5-10.1) 











Laboratory Tests








Test


 12/28/19


05:00 12/28/19


06:30


 


Sodium Level


 142 mmol/L


(136-145) 





 


Potassium Level


 3.9 mmol/L


(3.5-5.1) 





 


Chloride Level


 106 mmol/L


() 





 


Carbon Dioxide Level


 23 mmol/L


(21-32) 





 


Anion Gap 13 (6-14)  


 


Blood Urea Nitrogen


 12 mg/dL


(8-26) 





 


Creatinine


 1.0 mg/dL


(0.7-1.3) 





 


Estimated GFR


(Cockcroft-Gault) 76.2 


 





 


Glucose Level


 108 mg/dL


(70-99) 





 


Calcium Level


 8.2 mg/dL


(8.5-10.1) 





 


White Blood Count


 


 5.6 x10^3/uL


(4.0-11.0)


 


Red Blood Count


 


 4.29 x10^6/uL


(4.30-5.70)


 


Hemoglobin


 


 12.9 g/dL


(13.0-17.5)


 


Hematocrit


 


 38.9 %


(39.0-53.0)


 


Mean Corpuscular Volume  91 fL () 


 


Mean Corpuscular Hemoglobin  30 pg (25-35) 


 


Mean Corpuscular Hemoglobin


Concent 


 33 g/dL


(31-37)


 


Red Cell Distribution Width


 


 13.5 %


(11.5-14.5)


 


Platelet Count


 


 166 x10^3/uL


(140-400)


 


Neutrophils (%) (Auto)  66 % (31-73) 


 


Lymphocytes (%) (Auto)  20 % (24-48) 


 


Monocytes (%) (Auto)  11 % (0-9) 


 


Eosinophils (%) (Auto)  2 % (0-3) 


 


Basophils (%) (Auto)  1 % (0-3) 


 


Neutrophils # (Auto)


 


 3.7 x10^3/uL


(1.8-7.7)


 


Lymphocytes # (Auto)


 


 1.1 x10^3/uL


(1.0-4.8)


 


Monocytes # (Auto)


 


 0.6 x10^3/uL


(0.0-1.1)


 


Eosinophils # (Auto)


 


 0.1 x10^3/uL


(0.0-0.7)


 


Basophils # (Auto)


 


 0.0 x10^3/uL


(0.0-0.2)








Microbiology


12/23/19 Anaerobic/Aerobic Culture - Final, Complete


           


12/23/19 Anaerobic Culture Result 1 (LION) - Final, Complete


           


12/23/19 Aerobic Culture - Final, Complete


           


12/23/19 Aerobic Culture Result 1 (LION) - Final, Complete


           


12/23/19 Gram Stain - Final, Complete


           


12/23/19 Gram Stain Result 1 (LION) - Final, Complete


           


12/23/19 Gram Stain Result 2 (LION) - Final, Complete


Medications





Current Medications


Acetaminophen (Tylenol) 650 mg PRN Q4HRS  PRN PO PAIN;  Start 12/23/19 at 14:00;

 Stop 12/25/19 at 08:20;  Status DC


Ergocalciferol (Vitamin D2) 50,000 unit QMONTH PO ;  Start 1/22/20 at 09:00


Famotidine (Pepcid) 20 mg HS PO  Last administered on 12/27/19at 20:53;  Start 

12/23/19 at 21:00


Guaifenesin (Robitussin Dm) 5 ml PRN QID  PRN PO COUGH;  Start 12/23/19 at 14:00


Ascorbic Acid (Vitamin C) 500 mg BID PO  Last administered on 12/27/19at 20:53; 

Start 12/23/19 at 21:00


Clindamycin HCl (Cleocin) 300 mg TID PO  Last administered on 12/24/19at 08:39; 

Start 12/23/19 at 14:00;  Stop 12/24/19 at 09:30;  Status DC


Magnesium Hydroxide (Milk Of Magnesia) 2,400 mg PRN DAILY  PRN PO CONSTIPATION; 

Start 12/23/19 at 14:00


Mesalamine (Delzicol) 800 mg TID PO  Last administered on 12/27/19at 20:53;  

Start 12/23/19 at 15:00


Non-Formulary Medication (Protein Supplement (Nutritional Drink Mix)) 420 gm DA

RAJ PO ;  Start 12/24/19 at 09:00;  Status UNV


Iohexol (Omnipaque 300 Mg/ml) 75 ml 1X  ONCE IV  Last administered on 12/24/19at

08:29;  Start 12/24/19 at 08:00;  Stop 12/24/19 at 08:01;  Status DC


Acetaminophen (Tylenol) 500 mg PRN Q6HRS  PRN PO MILD PAIN / TEMP;  Start 

12/25/19 at 08:30


Acetaminophen/ Codeine Phosphate (Tylenol #3) 1 tab PRN Q6HRS  PRN PO MODERATE 

PAIN Last administered on 12/27/19at 20:57;  Start 12/25/19 at 08:30


Oxycodone/ Acetaminophen (Percocet 5/325) 1 tab PRN Q4HRS  PRN PO SEVERE PAIN 

Last administered on 12/26/19at 16:37;  Start 12/25/19 at 08:30


Morphine Sulfate (Morphine Sulfate) 2 mg PRN Q2HR  PRN IV PAIN;  Start 12/25/19 

at 08:30


Ondansetron HCl (Zofran) 4 mg PRN Q6HRS  PRN IVP NAUSEA/VOMITING;  Start 

12/27/19 at 09:30


Diphenhydramine HCl (Benadryl) 25 mg PRN QHS  PRN PO INSOMNIA;  Start 12/27/19 

at 09:30


Lidocaine HCl (Buffered Lidocaine 1%) 3 ml STK-MED ONCE .ROUTE ;  Start 12/27/19

at 10:01;  Stop 12/27/19 at 10:02;  Status DC


Midazolam HCl (Versed) 2 mg STK-MED ONCE .ROUTE ;  Start 12/27/19 at 10:20;  

Stop 12/27/19 at 10:21;  Status DC


Fentanyl Citrate (Fentanyl 2ml Vial) 100 mcg STK-MED ONCE .ROUTE ;  Start 

12/27/19 at 10:20;  Stop 12/27/19 at 10:21;  Status DC


Lidocaine HCl (Buffered Lidocaine 1%) 3 ml 1X  ONCE IJ  Last administered on 

12/27/19at 10:58;  Start 12/27/19 at 11:00;  Stop 12/27/19 at 11:01;  Status DC


Midazolam HCl (Versed) 2 mg 1X  ONCE IV  Last administered on 12/27/19at 10:57; 

Start 12/27/19 at 11:00;  Stop 12/27/19 at 11:01;  Status DC


Fentanyl Citrate (Fentanyl 2ml Vial) 100 mcg 1X  ONCE IV  Last administered on 

12/27/19at 10:57;  Start 12/27/19 at 11:00;  Stop 12/27/19 at 11:01;  Status DC


Vancomycin HCl (Vanco Per Pharmacy) 1 each PRN DAILY  PRN MC SEE COMMENTS Last 

administered on 12/27/19at 16:04;  Start 12/27/19 at 13:00


Piperacillin Sod/ Tazobactam Sod (Zosyn Per Pharmacy) 1 each PRN DAILY  PRN MC 

SEE COMMENTS;  Start 12/27/19 at 13:00


Vancomycin HCl 2 gm/Sodium Chloride 500 ml @  250 mls/hr 1X  ONCE IV  Last 

administered on 12/27/19at 15:01;  Start 12/27/19 at 14:00;  Stop 12/27/19 at 

15:59;  Status DC


Piperacillin Sod/ Tazobactam Sod 3.375 gm/Sodium Chloride 50 ml @  100 mls/hr 

Q6HRS IV  Last administered on 12/28/19at 05:31;  Start 12/27/19 at 13:15


Vancomycin HCl 1.5 gm/Sodium Chloride 500 ml @  250 mls/hr Q12H IV  Last 

administered on 12/28/19at 02:31;  Start 12/28/19 at 03:00


Vancomycin HCl (Vancomycin Trough Level) 1 each 1X  ONCE MC ;  Start 12/29/19 at

02:30;  Stop 12/29/19 at 02:31





Active Scripts


Active


Reported


Hydrocodone-Apap 5-325  ** (Hydrocodone Bit/Acetaminophen) 1 Tab Tablet 1-2 Tab 

PO PRN Q4HRS PRN


Tussin Dm Cough & Chest Syrup (Guaifenesin/Dextromethorphan) 118 Ml Syrup 5 Ml 

PO PRN QID PRN 12 Days


Milk Of Magnesia (Magnesium Hydroxide) 2,400 Mg/10 Ml Oral.susp 2,400 Mg PO PRN 

DAILY PRN


Tylenol (Acetaminophen) 325 Mg Tablet 650 Mg PO PRN Q4HRS PRN


Clindamycin Hcl 300 Mg Capsule 300 Mg PO TID


Vitamin D2 (Ergocalciferol (Vitamin D2)) 50,000 Unit Capsule 1 Cap PO QMONTH


Vitamin C (Ascorbate Calcium) 500 Mg Tablet 500 Mg PO BID


Nutritional Drink Mix (Protein Supplement) 420 Gm Powder 420 Gm PO DAILY


Multi-Vitamin Daily (Multivitamin) 1 Each Tablet 1 Each PO 


Famotidine 20 Mg Tablet 20 Mg PO HS


Asacol Hd (Mesalamine) 800 Mg Tablet.dr 1,600 Mg PO TID


Vitals/I & O





Vital Sign - Last 24 Hours








 12/27/19 12/27/19 12/27/19 12/27/19





 10:32 10:40 10:52 10:57


 


Pulse 76 73 78 


 


Resp 16 14 18 18


 


Pulse Ox   98 98


 


O2 Delivery  Nasal Cannula Nasal Cannula Nasal Cannula


 


O2 Flow Rate 2.0 2.0 2.0 2.0





 12/27/19 12/27/19 12/27/19 12/27/19





 11:00 11:27 15:00 19:00


 


Temp    97.9





    97.9


 


Pulse 68  65 70


 


Resp 17   17


 


B/P (MAP) 126/69 (88)  96/51 (66) 100/45 (63)


 


Pulse Ox 96  97 95


 


O2 Delivery Room Air Room Air Room Air Room Air


 


    





    





 12/27/19 12/27/19 12/27/19 12/27/19





 20:00 20:57 21:57 23:00


 


Temp    98.1





    98.1


 


Pulse    57


 


Resp    17


 


B/P (MAP)    102/58 (73)


 


Pulse Ox    95


 


O2 Delivery Room Air Room Air Room Air Room Air


 


    





    





 12/28/19   





 03:00   


 


Temp 98.0   





 98.0   


 


Pulse 63   


 


Resp 17   


 


B/P (MAP) 111/57 (75)   


 


Pulse Ox 96   


 


O2 Delivery Room Air   














Intake and Output   


 


 12/27/19 12/27/19 12/28/19





 15:00 23:00 07:00


 


Intake Total  300 ml 100 ml


 


Balance  300 ml 100 ml

















ADALBERTO FORD MD        Dec 28, 2019 09:00

## 2019-12-28 NOTE — PDOC
Infectious Disease Note


Subjective


Subjective


Comfortable


Eating


No F/C/N/V/or increase ostomy output





ROS


ROS


per HPI





Vital Sign


Vital Signs





Vital Signs








  Date Time  Temp Pulse Resp B/P (MAP) Pulse Ox O2 Delivery O2 Flow Rate FiO2


 


12/28/19 07:00 97.1 69 16 132/71 (91) 97 Room Air  





 97.1       


 


12/27/19 10:57       2.0 











Physical Exam


PHYSICAL EXAM


GENERAL:  Propped up in bed, alert, in no apparent distress.


HEENT:  Pupils equally round.  Oropharynx pink and moist.


NECK:  Supple, no lymphadenopathy.


LUNGS:  Clear to auscultation.


HEART:  S1, S2.


ABDOMEN:  Obese, soft, nontender, bowel sounds present.  Multiple scars and 

right-sided ostomy.


EXTREMITIES:  No gross edema or cyanosis.


SKIN:  Warm to touch without signs of rash. Several sacrococcygeal fistulas . 


NEUROLOGIC:  Alert and answering questions appropriately.  


PIV





Labs


Lab





Laboratory Tests








Test


 12/28/19


05:00 12/28/19


06:30


 


Sodium Level


 142 mmol/L


(136-145) 





 


Potassium Level


 3.9 mmol/L


(3.5-5.1) 





 


Chloride Level


 106 mmol/L


() 





 


Carbon Dioxide Level


 23 mmol/L


(21-32) 





 


Anion Gap 13 (6-14)  


 


Blood Urea Nitrogen


 12 mg/dL


(8-26) 





 


Creatinine


 1.0 mg/dL


(0.7-1.3) 





 


Estimated GFR


(Cockcroft-Gault) 76.2 


 





 


Glucose Level


 108 mg/dL


(70-99) 





 


Calcium Level


 8.2 mg/dL


(8.5-10.1) 





 


White Blood Count


 


 5.6 x10^3/uL


(4.0-11.0)


 


Red Blood Count


 


 4.29 x10^6/uL


(4.30-5.70)


 


Hemoglobin


 


 12.9 g/dL


(13.0-17.5)


 


Hematocrit


 


 38.9 %


(39.0-53.0)


 


Mean Corpuscular Volume  91 fL () 


 


Mean Corpuscular Hemoglobin  30 pg (25-35) 


 


Mean Corpuscular Hemoglobin


Concent 


 33 g/dL


(31-37)


 


Red Cell Distribution Width


 


 13.5 %


(11.5-14.5)


 


Platelet Count


 


 166 x10^3/uL


(140-400)


 


Neutrophils (%) (Auto)  66 % (31-73) 


 


Lymphocytes (%) (Auto)  20 % (24-48) 


 


Monocytes (%) (Auto)  11 % (0-9) 


 


Eosinophils (%) (Auto)  2 % (0-3) 


 


Basophils (%) (Auto)  1 % (0-3) 


 


Neutrophils # (Auto)


 


 3.7 x10^3/uL


(1.8-7.7)


 


Lymphocytes # (Auto)


 


 1.1 x10^3/uL


(1.0-4.8)


 


Monocytes # (Auto)


 


 0.6 x10^3/uL


(0.0-1.1)


 


Eosinophils # (Auto)


 


 0.1 x10^3/uL


(0.0-0.7)


 


Basophils # (Auto)


 


 0.0 x10^3/uL


(0.0-0.2)








Micro





Microbiology


12/23/19 Anaerobic/Aerobic Culture - Final, Complete


           


12/23/19 Anaerobic Culture Result 1 (LION) - Final, Complete


           


12/23/19 Aerobic Culture - Final, Complete


           


12/23/19 Aerobic Culture Result 1 (LION) - Final, Complete


           


12/23/19 Gram Stain - Final, Complete


           


12/23/19 Gram Stain Result 1 (LION) - Final, Complete


           


12/23/19 Gram Stain Result 2 (LION) - Final, Complete





Objective


Assessment


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 culture neg 


Crohn's disease with history of ostomy and multiple fistula repairs.


Acute and chronic OM changes on CT abdomen and pelvis ,Pt has been treated with 

previous OM at [facility] in the past . Chronic OM could be from the same


    - ESR 35


    - s/p IR biopsy 12/27


Gastroesophageal reflux disease.


Obesity.


Kidney stone on CT Abdomen








Plan


Plan of Care


Vanc and Zosyn 


monitor renal functions closely


Probiotics 


Bedrest P500 bed





will need I and D of acute om, antibiotics alone will not be optimal


Rec transfer to [facility] DR Otero for I and D, ostectomy and possible flap if 

eligible





D/w nursing 


D/w Dr. Harry JACOBO denied transfer 





3 visable packings.  clean





F/u cults





Attending Co-Sign


Attending Co-Sign


The patient was seen and interviewed as well as examined at the bedside. The 

chart was reviewed. The case was discussed. Agree with the plan of care.











CASEY CHE        Dec 28, 2019 12:19


RIA BELLE MD              Dec 28, 2019 17:02

## 2019-12-29 VITALS — DIASTOLIC BLOOD PRESSURE: 35 MMHG | SYSTOLIC BLOOD PRESSURE: 93 MMHG

## 2019-12-29 VITALS — DIASTOLIC BLOOD PRESSURE: 47 MMHG | SYSTOLIC BLOOD PRESSURE: 118 MMHG

## 2019-12-29 VITALS — SYSTOLIC BLOOD PRESSURE: 112 MMHG | DIASTOLIC BLOOD PRESSURE: 51 MMHG

## 2019-12-29 VITALS — SYSTOLIC BLOOD PRESSURE: 102 MMHG | DIASTOLIC BLOOD PRESSURE: 55 MMHG

## 2019-12-29 VITALS — SYSTOLIC BLOOD PRESSURE: 107 MMHG | DIASTOLIC BLOOD PRESSURE: 65 MMHG

## 2019-12-29 VITALS — SYSTOLIC BLOOD PRESSURE: 107 MMHG | DIASTOLIC BLOOD PRESSURE: 59 MMHG

## 2019-12-29 LAB
ANION GAP SERPL CALC-SCNC: 9 MMOL/L (ref 6–14)
BASOPHILS # BLD AUTO: 0 X10^3/UL (ref 0–0.2)
BASOPHILS NFR BLD: 1 % (ref 0–3)
BUN SERPL-MCNC: 11 MG/DL (ref 8–26)
CALCIUM SERPL-MCNC: 8.2 MG/DL (ref 8.5–10.1)
CHLORIDE SERPL-SCNC: 108 MMOL/L (ref 98–107)
CO2 SERPL-SCNC: 26 MMOL/L (ref 21–32)
CREAT SERPL-MCNC: 1.1 MG/DL (ref 0.7–1.3)
EOSINOPHIL NFR BLD: 0.1 X10^3/UL (ref 0–0.7)
EOSINOPHIL NFR BLD: 3 % (ref 0–3)
ERYTHROCYTE [DISTWIDTH] IN BLOOD BY AUTOMATED COUNT: 13.7 % (ref 11.5–14.5)
GFR SERPLBLD BASED ON 1.73 SQ M-ARVRAT: 68.3 ML/MIN
GLUCOSE SERPL-MCNC: 135 MG/DL (ref 70–99)
HCT VFR BLD CALC: 36.9 % (ref 39–53)
HGB BLD-MCNC: 12.4 G/DL (ref 13–17.5)
LYMPHOCYTES # BLD: 1.1 X10^3/UL (ref 1–4.8)
LYMPHOCYTES NFR BLD AUTO: 22 % (ref 24–48)
MCH RBC QN AUTO: 30 PG (ref 25–35)
MCHC RBC AUTO-ENTMCNC: 34 G/DL (ref 31–37)
MCV RBC AUTO: 91 FL (ref 79–100)
MONO #: 0.5 X10^3/UL (ref 0–1.1)
MONOCYTES NFR BLD: 9 % (ref 0–9)
NEUT #: 3.5 X10^3/UL (ref 1.8–7.7)
NEUTROPHILS NFR BLD AUTO: 66 % (ref 31–73)
PLATELET # BLD AUTO: 174 X10^3/UL (ref 140–400)
POTASSIUM SERPL-SCNC: 4 MMOL/L (ref 3.5–5.1)
RBC # BLD AUTO: 4.08 X10^6/UL (ref 4.3–5.7)
SODIUM SERPL-SCNC: 143 MMOL/L (ref 136–145)
VANC TR: 12.1 MCG/ML (ref 10–20)
WBC # BLD AUTO: 5.3 X10^3/UL (ref 4–11)

## 2019-12-29 RX ADMIN — VANCOMYCIN HYDROCHLORIDE PRN EACH: 1 INJECTION, POWDER, LYOPHILIZED, FOR SOLUTION INTRAVENOUS at 07:39

## 2019-12-29 RX ADMIN — VANCOMYCIN HYDROCHLORIDE SCH MLS/HR: 1 INJECTION, POWDER, FOR SOLUTION INTRAVENOUS at 04:08

## 2019-12-29 RX ADMIN — MESALAMINE SCH MG: 400 CAPSULE, DELAYED RELEASE ORAL at 09:00

## 2019-12-29 RX ADMIN — PIPERACILLIN SODIUM AND TAZOBACTAM SODIUM SCH MLS/HR: 3; .375 INJECTION, POWDER, LYOPHILIZED, FOR SOLUTION INTRAVENOUS at 00:11

## 2019-12-29 RX ADMIN — OXYCODONE HYDROCHLORIDE AND ACETAMINOPHEN SCH MG: 500 TABLET ORAL at 21:06

## 2019-12-29 RX ADMIN — PIPERACILLIN SODIUM AND TAZOBACTAM SODIUM SCH MLS/HR: 3; .375 INJECTION, POWDER, LYOPHILIZED, FOR SOLUTION INTRAVENOUS at 11:58

## 2019-12-29 RX ADMIN — PIPERACILLIN SODIUM AND TAZOBACTAM SODIUM SCH MLS/HR: 3; .375 INJECTION, POWDER, LYOPHILIZED, FOR SOLUTION INTRAVENOUS at 19:26

## 2019-12-29 RX ADMIN — PIPERACILLIN SODIUM AND TAZOBACTAM SODIUM SCH MLS/HR: 3; .375 INJECTION, POWDER, LYOPHILIZED, FOR SOLUTION INTRAVENOUS at 06:18

## 2019-12-29 RX ADMIN — FAMOTIDINE SCH MG: 20 TABLET ORAL at 21:06

## 2019-12-29 RX ADMIN — OXYCODONE HYDROCHLORIDE AND ACETAMINOPHEN SCH MG: 500 TABLET ORAL at 09:02

## 2019-12-29 RX ADMIN — MESALAMINE SCH MG: 400 CAPSULE, DELAYED RELEASE ORAL at 21:07

## 2019-12-29 RX ADMIN — VANCOMYCIN HYDROCHLORIDE PRN EACH: 1 INJECTION, POWDER, LYOPHILIZED, FOR SOLUTION INTRAVENOUS at 04:30

## 2019-12-29 RX ADMIN — OXYCODONE HYDROCHLORIDE AND ACETAMINOPHEN PRN TAB: 5; 325 TABLET ORAL at 21:06

## 2019-12-29 RX ADMIN — Medication SCH CAP: at 09:02

## 2019-12-29 RX ADMIN — MESALAMINE SCH MG: 400 CAPSULE, DELAYED RELEASE ORAL at 14:00

## 2019-12-29 RX ADMIN — Medication SCH CAP: at 21:06

## 2019-12-29 RX ADMIN — VANCOMYCIN HYDROCHLORIDE SCH MLS/HR: 1 INJECTION, POWDER, FOR SOLUTION INTRAVENOUS at 16:53

## 2019-12-29 NOTE — NUR
Pharmacy Vancomycin Dosing Note



S: Consulted to monitor and dose vancomycin started 12/27/19.



O: ANIVAL DOWD is a 59 year old M with 

Osteomyelitis, .



Other Antibiotics: 

ZOSYN



LABS:

Last BUN: 12 

Last Creatinine: 1.0 

Creatinine Clearance: 88 mL/min

Last WBC: 5.6 

Last Procalcitonin: 

Tmax (past 24 hours): AFEBRILE 



Microbiology:  



I/O: 400/-  3 VOIDS 



Drug Levels:

Last Trough level: 12.1  on 12/29/19 at 0330 

Last dose given 12/28/19 at 1500 



Vancomycin Dosing:

Dosing Weight: Actual

Target Trough: 15-20





A: Based on: Trough, Actual Wt and CrCl







P: 1. 12/29/19 1200 Increase  Vancomycin 1500 mg IV q8h

   2. Follow up Trough level on 12/30/19 at 1130 

   3. Pharmacy will continue to monitor, follow and adjust therapy as needed.

 

 JAIMEE PEREZ RPH, 12/29/19 0431

-------------------------------------------------------------------------------

Signed:    12/29/19 at 0432 by JAIMEE PEREZ RPH PHA

-------------------------------------------------------------------------------

## 2019-12-29 NOTE — PDOC
Infectious Disease Note


Subjective


Subjective


Comfortable


No F/C/N/V/or increase ostomy output





ROS


ROS


per HPI





Vital Sign


Vital Signs





Vital Signs








  Date Time  Temp Pulse Resp B/P (MAP) Pulse Ox O2 Delivery O2 Flow Rate FiO2


 


12/29/19 07:00 97.3 67 16 93/35 (54) 93 Room Air  





 97.3       











Physical Exam


PHYSICAL EXAM


GENERAL:  Propped up in bed, alert, in no apparent distress.


HEENT:  Pupils equally round.  Oropharynx pink and moist.


NECK:  Supple


LUNGS:  Clear to auscultation.


HEART:  S1, S2.


ABDOMEN:  Obese, soft, nontender, bowel sounds present.  Multiple scars and 

right-sided ostomy.


EXTREMITIES:  No gross edema or cyanosis.


SKIN:  Warm to touch without signs of rash. Several sacrococcygeal fistula, 3 

packed. + drainage, no surrounding redness  


NEUROLOGIC:  Alert and answering questions appropriately.  


PIV





Labs


Lab





Laboratory Tests








Test


 12/29/19


03:25


 


White Blood Count


 5.3 x10^3/uL


(4.0-11.0)


 


Red Blood Count


 4.08 x10^6/uL


(4.30-5.70)


 


Hemoglobin


 12.4 g/dL


(13.0-17.5)


 


Hematocrit


 36.9 %


(39.0-53.0)


 


Mean Corpuscular Volume 91 fL () 


 


Mean Corpuscular Hemoglobin 30 pg (25-35) 


 


Mean Corpuscular Hemoglobin


Concent 34 g/dL


(31-37)


 


Red Cell Distribution Width


 13.7 %


(11.5-14.5)


 


Platelet Count


 174 x10^3/uL


(140-400)


 


Neutrophils (%) (Auto) 66 % (31-73) 


 


Lymphocytes (%) (Auto) 22 % (24-48) 


 


Monocytes (%) (Auto) 9 % (0-9) 


 


Eosinophils (%) (Auto) 3 % (0-3) 


 


Basophils (%) (Auto) 1 % (0-3) 


 


Neutrophils # (Auto)


 3.5 x10^3/uL


(1.8-7.7)


 


Lymphocytes # (Auto)


 1.1 x10^3/uL


(1.0-4.8)


 


Monocytes # (Auto)


 0.5 x10^3/uL


(0.0-1.1)


 


Eosinophils # (Auto)


 0.1 x10^3/uL


(0.0-0.7)


 


Basophils # (Auto)


 0.0 x10^3/uL


(0.0-0.2)


 


Sodium Level


 143 mmol/L


(136-145)


 


Potassium Level


 4.0 mmol/L


(3.5-5.1)


 


Chloride Level


 108 mmol/L


()


 


Carbon Dioxide Level


 26 mmol/L


(21-32)


 


Anion Gap 9 (6-14) 


 


Blood Urea Nitrogen


 11 mg/dL


(8-26)


 


Creatinine


 1.1 mg/dL


(0.7-1.3)


 


Estimated GFR


(Cockcroft-Gault) 68.3 





 


Glucose Level


 135 mg/dL


(70-99)


 


Calcium Level


 8.2 mg/dL


(8.5-10.1)


 


Vancomycin Level Trough


 12.1 mcg/mL


(10.0-20.0)


 


Vancomycin Last Dose Date  


 


Vancomycin Last Dose Time  








Micro





Microbiology


12/23/19 Anaerobic/Aerobic Culture - Final, Complete


           


12/23/19 Anaerobic Culture Result 1 ([facility]) - Final, Complete


           


12/23/19 Aerobic Culture - Final, Complete


           


12/23/19 Aerobic Culture Result 1 ([facility]) - Final, Complete


           


12/23/19 Gram Stain - Final, Complete


           


12/23/19 Gram Stain Result 1 ([facility]) - Final, Complete


           


12/23/19 Gram Stain Result 2 ([facility]) - Final, Complete





Objective


Assessment


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 culture neg 

so far 


Crohn's disease with history of ostomy and multiple fistula repairs.


Acute and chronic OM changes on CT abdomen and pelvis ,Pt has been treated with 

previous OM at [facility] in the past . Chronic OM could be from the same


    - ESR 35


    - s/p IR biopsy 12/27


Gastroesophageal reflux disease.


Obesity.


Kidney stone on CT Abdomen








Plan


Plan of Care


Vanc and Zosyn 


Trough 12.1 on 12/29 


Monitor renal functions closely


Probiotics 


Bedrest P500 bed





will need I and D of acute om, antibiotics alone will not be optimal


Rec transfer to [facility] for I and D, ostectomy and possible flap if 

eligible





Attending Co-Sign


Attending Co-Sign


The patient was seen and interviewed as well as examined at the bedside. The 

chart was reviewed. The case was discussed. Agree with the plan of care.











CASEY CHE        Dec 29, 2019 12:41


RIA BELLE MD              Dec 29, 2019 15:49

## 2019-12-29 NOTE — NUR
patient wounds changed. Pictures were taken, but the wounds were not well captured because 
they were bleeding.

## 2019-12-29 NOTE — PDOC
PROGRESS NOTES


Chief Complaint


Chief Complaint


Perianal wound - with bleeding NON SURGICAL /MED mx


Severe Crohn's with multiple surgeries s/p ostomy (multiple fissures and 

abdominal wounds)


Chronic pain


Hyperlipidemia


Hypertension


GERD


Incidental 5 mm rt kidney stone


HIatal hernia with small bowel loops - known to him





History of Present Illness


History of Present Illness


Ambulatory, non toxic appearing


HX crohns for yrs now


[place]ives in [place] and has HH wound care





No fevers, he is comfortable. Cultures with NGTD, but CT with osteomyelitis of 

sacrum concerning. No CP or SOB





PLAN:


Attempted [facility] transfer


GS mentions poor surgical candidate


D/w ID - will need I and D of acute om, antibiotics alone will not be optimal


Rec transfer to [facility] for I and D, ostectomy and possible flap if 

eligible - [facility] has denied transfer, requests outpatient. He is known to their 

service





Vitals


Vitals





Vital Signs








  Date Time  Temp Pulse Resp B/P (MAP) Pulse Ox O2 Delivery O2 Flow Rate FiO2


 


12/29/19 03:18 97.5 61 20 107/65 (79) 96 Room Air  





 97.5       











Physical Exam


Physical Exam


GENERAL:  Propped up in bed, alert, in no apparent distress.


HEENT:  Pupils equally round.  Oropharynx pink and moist.


NECK:  Supple, no lymphadenopathy.


LUNGS:  Clear to auscultation.


HEART:  S1, S2.


ABDOMEN:  Obese, soft, nontender, bowel sounds present.  Multiple scars and 

right-sided ostomy.


EXTREMITIES:  No gross edema or cyanosis.


SKIN:  Warm to touch without signs of rash. Several sacrococcygeal fistulas . 


NEUROLOGIC:  Alert and answering questions appropriately.  


PIV


General:  Alert, Oriented X3, Cooperative, No acute distress


Heart:  Regular rate, Normal S1, Normal S2


Lungs:  Clear


Abdomen:  Soft, No tenderness


Extremities:  No clubbing, No cyanosis


Skin:  Other (dressing intact)





Labs


LABS





Laboratory Tests








Test


 12/29/19


03:25


 


White Blood Count


 5.3 x10^3/uL


(4.0-11.0)


 


Red Blood Count


 4.08 x10^6/uL


(4.30-5.70)


 


Hemoglobin


 12.4 g/dL


(13.0-17.5)


 


Hematocrit


 36.9 %


(39.0-53.0)


 


Mean Corpuscular Volume 91 fL () 


 


Mean Corpuscular Hemoglobin 30 pg (25-35) 


 


Mean Corpuscular Hemoglobin


Concent 34 g/dL


(31-37)


 


Red Cell Distribution Width


 13.7 %


(11.5-14.5)


 


Platelet Count


 174 x10^3/uL


(140-400)


 


Neutrophils (%) (Auto) 66 % (31-73) 


 


Lymphocytes (%) (Auto) 22 % (24-48) 


 


Monocytes (%) (Auto) 9 % (0-9) 


 


Eosinophils (%) (Auto) 3 % (0-3) 


 


Basophils (%) (Auto) 1 % (0-3) 


 


Neutrophils # (Auto)


 3.5 x10^3/uL


(1.8-7.7)


 


Lymphocytes # (Auto)


 1.1 x10^3/uL


(1.0-4.8)


 


Monocytes # (Auto)


 0.5 x10^3/uL


(0.0-1.1)


 


Eosinophils # (Auto)


 0.1 x10^3/uL


(0.0-0.7)


 


Basophils # (Auto)


 0.0 x10^3/uL


(0.0-0.2)


 


Sodium Level


 143 mmol/L


(136-145)


 


Potassium Level


 4.0 mmol/L


(3.5-5.1)


 


Chloride Level


 108 mmol/L


()


 


Carbon Dioxide Level


 26 mmol/L


(21-32)


 


Anion Gap 9 (6-14) 


 


Blood Urea Nitrogen


 11 mg/dL


(8-26)


 


Creatinine


 1.1 mg/dL


(0.7-1.3)


 


Estimated GFR


(Cockcroft-Gault) 68.3 





 


Glucose Level


 135 mg/dL


(70-99)


 


Calcium Level


 8.2 mg/dL


(8.5-10.1)


 


Vancomycin Level Trough


 12.1 mcg/mL


(10.0-20.0)


 


Vancomycin Last Dose Date  


 


Vancomycin Last Dose Time  











Comment


Review of Relevant


I have reviewed the following items crow (where applicable) has been applied.


Labs





Laboratory Tests








Test


 12/28/19


05:00 12/28/19


06:30 12/29/19


03:25


 


Sodium Level


 142 mmol/L


(136-145) 


 143 mmol/L


(136-145)


 


Potassium Level


 3.9 mmol/L


(3.5-5.1) 


 4.0 mmol/L


(3.5-5.1)


 


Chloride Level


 106 mmol/L


() 


 108 mmol/L


()


 


Carbon Dioxide Level


 23 mmol/L


(21-32) 


 26 mmol/L


(21-32)


 


Anion Gap 13 (6-14)   9 (6-14) 


 


Blood Urea Nitrogen


 12 mg/dL


(8-26) 


 11 mg/dL


(8-26)


 


Creatinine


 1.0 mg/dL


(0.7-1.3) 


 1.1 mg/dL


(0.7-1.3)


 


Estimated GFR


(Cockcroft-Gault) 76.2 


 


 68.3 





 


Glucose Level


 108 mg/dL


(70-99) 


 135 mg/dL


(70-99)


 


Calcium Level


 8.2 mg/dL


(8.5-10.1) 


 8.2 mg/dL


(8.5-10.1)


 


White Blood Count


 


 5.6 x10^3/uL


(4.0-11.0) 5.3 x10^3/uL


(4.0-11.0)


 


Red Blood Count


 


 4.29 x10^6/uL


(4.30-5.70) 4.08 x10^6/uL


(4.30-5.70)


 


Hemoglobin


 


 12.9 g/dL


(13.0-17.5) 12.4 g/dL


(13.0-17.5)


 


Hematocrit


 


 38.9 %


(39.0-53.0) 36.9 %


(39.0-53.0)


 


Mean Corpuscular Volume  91 fL ()  91 fL () 


 


Mean Corpuscular Hemoglobin  30 pg (25-35)  30 pg (25-35) 


 


Mean Corpuscular Hemoglobin


Concent 


 33 g/dL


(31-37) 34 g/dL


(31-37)


 


Red Cell Distribution Width


 


 13.5 %


(11.5-14.5) 13.7 %


(11.5-14.5)


 


Platelet Count


 


 166 x10^3/uL


(140-400) 174 x10^3/uL


(140-400)


 


Neutrophils (%) (Auto)  66 % (31-73)  66 % (31-73) 


 


Lymphocytes (%) (Auto)  20 % (24-48)  22 % (24-48) 


 


Monocytes (%) (Auto)  11 % (0-9)  9 % (0-9) 


 


Eosinophils (%) (Auto)  2 % (0-3)  3 % (0-3) 


 


Basophils (%) (Auto)  1 % (0-3)  1 % (0-3) 


 


Neutrophils # (Auto)


 


 3.7 x10^3/uL


(1.8-7.7) 3.5 x10^3/uL


(1.8-7.7)


 


Lymphocytes # (Auto)


 


 1.1 x10^3/uL


(1.0-4.8) 1.1 x10^3/uL


(1.0-4.8)


 


Monocytes # (Auto)


 


 0.6 x10^3/uL


(0.0-1.1) 0.5 x10^3/uL


(0.0-1.1)


 


Eosinophils # (Auto)


 


 0.1 x10^3/uL


(0.0-0.7) 0.1 x10^3/uL


(0.0-0.7)


 


Basophils # (Auto)


 


 0.0 x10^3/uL


(0.0-0.2) 0.0 x10^3/uL


(0.0-0.2)


 


Vancomycin Level Trough


 


 


 12.1 mcg/mL


(10.0-20.0)


 


Vancomycin Last Dose Date    


 


Vancomycin Last Dose Time    








Laboratory Tests








Test


 12/29/19


03:25


 


White Blood Count


 5.3 x10^3/uL


(4.0-11.0)


 


Red Blood Count


 4.08 x10^6/uL


(4.30-5.70)


 


Hemoglobin


 12.4 g/dL


(13.0-17.5)


 


Hematocrit


 36.9 %


(39.0-53.0)


 


Mean Corpuscular Volume 91 fL () 


 


Mean Corpuscular Hemoglobin 30 pg (25-35) 


 


Mean Corpuscular Hemoglobin


Concent 34 g/dL


(31-37)


 


Red Cell Distribution Width


 13.7 %


(11.5-14.5)


 


Platelet Count


 174 x10^3/uL


(140-400)


 


Neutrophils (%) (Auto) 66 % (31-73) 


 


Lymphocytes (%) (Auto) 22 % (24-48) 


 


Monocytes (%) (Auto) 9 % (0-9) 


 


Eosinophils (%) (Auto) 3 % (0-3) 


 


Basophils (%) (Auto) 1 % (0-3) 


 


Neutrophils # (Auto)


 3.5 x10^3/uL


(1.8-7.7)


 


Lymphocytes # (Auto)


 1.1 x10^3/uL


(1.0-4.8)


 


Monocytes # (Auto)


 0.5 x10^3/uL


(0.0-1.1)


 


Eosinophils # (Auto)


 0.1 x10^3/uL


(0.0-0.7)


 


Basophils # (Auto)


 0.0 x10^3/uL


(0.0-0.2)


 


Sodium Level


 143 mmol/L


(136-145)


 


Potassium Level


 4.0 mmol/L


(3.5-5.1)


 


Chloride Level


 108 mmol/L


()


 


Carbon Dioxide Level


 26 mmol/L


(21-32)


 


Anion Gap 9 (6-14) 


 


Blood Urea Nitrogen


 11 mg/dL


(8-26)


 


Creatinine


 1.1 mg/dL


(0.7-1.3)


 


Estimated GFR


(Cockcroft-Gault) 68.3 





 


Glucose Level


 135 mg/dL


(70-99)


 


Calcium Level


 8.2 mg/dL


(8.5-10.1)


 


Vancomycin Level Trough


 12.1 mcg/mL


(10.0-20.0)


 


Vancomycin Last Dose Date  


 


Vancomycin Last Dose Time  








Microbiology


12/27/19 Anaerobic/Aerobic Culture, Resulted


           Pending


12/27/19 Anaerobic Culture Result 1 (LION), Resulted


           Pending


12/27/19 Aerobic Culture, Resulted


           Pending


12/27/19 Aerobic Culture Result 1 (LION), Resulted


           Pending


12/27/19 Gram Stain - Final, Resulted


           


12/27/19 Gram Stain Result 1 (LION) - Final, Resulted


           


12/27/19 Gram Stain Result 2 (LION) - Final, Resulted


Medications





Current Medications


Acetaminophen (Tylenol) 650 mg PRN Q4HRS  PRN PO PAIN;  Start 12/23/19 at 14:00;

 Stop 12/25/19 at 08:20;  Status DC


Ergocalciferol (Vitamin D2) 50,000 unit QMONTH PO ;  Start 1/22/20 at 09:00


Famotidine (Pepcid) 20 mg HS PO  Last administered on 12/28/19at 21:31;  Start 

12/23/19 at 21:00


Guaifenesin (Robitussin Dm) 5 ml PRN QID  PRN PO COUGH;  Start 12/23/19 at 14:00


Ascorbic Acid (Vitamin C) 500 mg BID PO  Last administered on 12/28/19at 21:13; 

Start 12/23/19 at 21:00


Clindamycin HCl (Cleocin) 300 mg TID PO  Last administered on 12/24/19at 08:39; 

Start 12/23/19 at 14:00;  Stop 12/24/19 at 09:30;  Status DC


Magnesium Hydroxide (Milk Of Magnesia) 2,400 mg PRN DAILY  PRN PO CONSTIPATION; 

Start 12/23/19 at 14:00


Mesalamine (Delzicol) 800 mg TID PO  Last administered on 12/28/19at 21:13;  

Start 12/23/19 at 15:00


Non-Formulary Medication (Protein Supplement (Nutritional Drink Mix)) 420 gm 

DAILY PO ;  Start 12/24/19 at 09:00;  Status UNV


Iohexol (Omnipaque 300 Mg/ml) 75 ml 1X  ONCE IV  Last administered on 12/24/19at

08:29;  Start 12/24/19 at 08:00;  Stop 12/24/19 at 08:01;  Status DC


Acetaminophen (Tylenol) 500 mg PRN Q6HRS  PRN PO MILD PAIN / TEMP;  Start 

12/25/19 at 08:30


Acetaminophen/ Codeine Phosphate (Tylenol #3) 1 tab PRN Q6HRS  PRN PO MODERATE 

PAIN Last administered on 12/27/19at 20:57;  Start 12/25/19 at 08:30


Oxycodone/ Acetaminophen (Percocet 5/325) 1 tab PRN Q4HRS  PRN PO SEVERE PAIN 

Last administered on 12/26/19at 16:37;  Start 12/25/19 at 08:30


Morphine Sulfate (Morphine Sulfate) 2 mg PRN Q2HR  PRN IV PAIN;  Start 12/25/19 

at 08:30


Ondansetron HCl (Zofran) 4 mg PRN Q6HRS  PRN IVP NAUSEA/VOMITING;  Start 

12/27/19 at 09:30


Diphenhydramine HCl (Benadryl) 25 mg PRN QHS  PRN PO INSOMNIA;  Start 12/27/19 

at 09:30


Lidocaine HCl (Buffered Lidocaine 1%) 3 ml STK-MED ONCE .ROUTE ;  Start 12/27/19

at 10:01;  Stop 12/27/19 at 10:02;  Status DC


Midazolam HCl (Versed) 2 mg STK-MED ONCE .ROUTE ;  Start 12/27/19 at 10:20;  

Stop 12/27/19 at 10:21;  Status DC


Fentanyl Citrate (Fentanyl 2ml Vial) 100 mcg STK-MED ONCE .ROUTE ;  Start 12/ 27/19 at 10:20;  Stop 12/27/19 at 10:21;  Status DC


Lidocaine HCl (Buffered Lidocaine 1%) 3 ml 1X  ONCE IJ  Last administered on 

12/27/19at 10:58;  Start 12/27/19 at 11:00;  Stop 12/27/19 at 11:01;  Status DC


Midazolam HCl (Versed) 2 mg 1X  ONCE IV  Last administered on 12/27/19at 10:57; 

Start 12/27/19 at 11:00;  Stop 12/27/19 at 11:01;  Status DC


Fentanyl Citrate (Fentanyl 2ml Vial) 100 mcg 1X  ONCE IV  Last administered on 

12/27/19at 10:57;  Start 12/27/19 at 11:00;  Stop 12/27/19 at 11:01;  Status DC


Vancomycin HCl (Vanco Per Pharmacy) 1 each PRN DAILY  PRN MC SEE COMMENTS Last 

administered on 12/29/19at 07:39;  Start 12/27/19 at 13:00


Piperacillin Sod/ Tazobactam Sod (Zosyn Per Pharmacy) 1 each PRN DAILY  PRN MC 

SEE COMMENTS;  Start 12/27/19 at 13:00


Vancomycin HCl 2 gm/Sodium Chloride 500 ml @  250 mls/hr 1X  ONCE IV  Last 

administered on 12/27/19at 15:01;  Start 12/27/19 at 14:00;  Stop 12/27/19 at 

15:59;  Status DC


Piperacillin Sod/ Tazobactam Sod 3.375 gm/Sodium Chloride 50 ml @  100 mls/hr 

Q6HRS IV  Last administered on 12/29/19at 06:18;  Start 12/27/19 at 13:15


Vancomycin HCl 1.5 gm/Sodium Chloride 500 ml @  250 mls/hr Q12H IV  Last 

administered on 12/29/19at 04:08;  Start 12/28/19 at 03:00;  Stop 12/29/19 at 

06:00;  Status DC


Vancomycin HCl (Vancomycin Trough Level) 1 each 1X  ONCE MC  Last administered 

on 12/29/19at 03:00;  Start 12/29/19 at 02:30;  Stop 12/29/19 at 02:31;  Status 

DC


Lactobacillus Rhamnosus (Culturelle) 1 cap BID PO  Last administered on 

12/28/19at 21:13;  Start 12/28/19 at 21:00


Vancomycin HCl 1.5 gm/Sodium Chloride 500 ml @  250 mls/hr Q8H IV ;  Start 

12/29/19 at 12:00;  Stop 12/29/19 at 07:31;  Status DC


Vancomycin HCl (Vancomycin Trough Level) 1 each 1X  ONCE MC ;  Start 12/30/19 at

11:30;  Stop 12/29/19 at 07:31;  Status DC


Vancomycin HCl 2 gm/Sodium Chloride 500 ml @  250 mls/hr Q12H IV ;  Start 

12/29/19 at 16:00


Vancomycin HCl (Vancomycin Trough Level) 1 each 1X  ONCE MC ;  Start 12/30/19 at

15:30;  Stop 12/30/19 at 15:31





Active Scripts


Active


Reported


Hydrocodone-Apap 5-325  ** (Hydrocodone Bit/Acetaminophen) 1 Tab Tablet 1-2 Tab 

PO PRN Q4HRS PRN


Tussin Dm Cough & Chest Syrup (Guaifenesin/Dextromethorphan) 118 Ml Syrup 5 Ml 

PO PRN QID PRN 12 Days


Milk Of Magnesia (Magnesium Hydroxide) 2,400 Mg/10 Ml Oral.susp 2,400 Mg PO PRN 

DAILY PRN


Tylenol (Acetaminophen) 325 Mg Tablet 650 Mg PO PRN Q4HRS PRN


Clindamycin Hcl 300 Mg Capsule 300 Mg PO TID


Vitamin D2 (Ergocalciferol (Vitamin D2)) 50,000 Unit Capsule 1 Cap PO QMONTH


Vitamin C (Ascorbate Calcium) 500 Mg Tablet 500 Mg PO BID


Nutritional Drink Mix (Protein Supplement) 420 Gm Powder 420 Gm PO DAILY


Multi-Vitamin Daily (Multivitamin) 1 Each Tablet 1 Each PO 


Famotidine 20 Mg Tablet 20 Mg PO HS


Asacol Hd (Mesalamine) 800 Mg Tablet.dr 1,600 Mg PO TID


Vitals/I & O





Vital Sign - Last 24 Hours








 12/28/19 12/28/19 12/28/19 12/28/19





 11:00 15:00 19:00 20:00


 


Temp 97.4 97.9 97.8 





 97.4 97.9 97.8 


 


Pulse 67 57 64 


 


Resp 16 16 20 


 


B/P (MAP) 93/54 (67) 100/46 (64) 91/39 (56) 


 


Pulse Ox 99 97 95 


 


O2 Delivery Room Air Room Air Room Air Room Air


 


    





    





 12/28/19 12/29/19  





 23:00 03:18  


 


Temp 97.8 97.5  





 97.8 97.5  


 


Pulse 62 61  


 


Resp 18 20  


 


B/P (MAP) 107/54 (71) 107/65 (79)  


 


Pulse Ox 94 96  


 


O2 Delivery Room Air Room Air  

















ADALBERTO FORD MD        Dec 29, 2019 08:47

## 2019-12-30 VITALS — SYSTOLIC BLOOD PRESSURE: 106 MMHG | DIASTOLIC BLOOD PRESSURE: 60 MMHG

## 2019-12-30 VITALS — SYSTOLIC BLOOD PRESSURE: 104 MMHG | DIASTOLIC BLOOD PRESSURE: 55 MMHG

## 2019-12-30 VITALS — SYSTOLIC BLOOD PRESSURE: 111 MMHG | DIASTOLIC BLOOD PRESSURE: 66 MMHG

## 2019-12-30 VITALS — DIASTOLIC BLOOD PRESSURE: 57 MMHG | SYSTOLIC BLOOD PRESSURE: 115 MMHG

## 2019-12-30 VITALS — DIASTOLIC BLOOD PRESSURE: 58 MMHG | SYSTOLIC BLOOD PRESSURE: 107 MMHG

## 2019-12-30 VITALS — SYSTOLIC BLOOD PRESSURE: 100 MMHG | DIASTOLIC BLOOD PRESSURE: 57 MMHG

## 2019-12-30 LAB
ANION GAP SERPL CALC-SCNC: 10 MMOL/L (ref 6–14)
BASOPHILS # BLD AUTO: 0 X10^3/UL (ref 0–0.2)
BASOPHILS NFR BLD: 0 % (ref 0–3)
BUN SERPL-MCNC: 7 MG/DL (ref 8–26)
CALCIUM SERPL-MCNC: 8.2 MG/DL (ref 8.5–10.1)
CHLORIDE SERPL-SCNC: 108 MMOL/L (ref 98–107)
CO2 SERPL-SCNC: 25 MMOL/L (ref 21–32)
CREAT SERPL-MCNC: 1 MG/DL (ref 0.7–1.3)
EOSINOPHIL NFR BLD: 0.2 X10^3/UL (ref 0–0.7)
EOSINOPHIL NFR BLD: 3 % (ref 0–3)
ERYTHROCYTE [DISTWIDTH] IN BLOOD BY AUTOMATED COUNT: 13.8 % (ref 11.5–14.5)
GFR SERPLBLD BASED ON 1.73 SQ M-ARVRAT: 76.2 ML/MIN
GLUCOSE SERPL-MCNC: 88 MG/DL (ref 70–99)
HCT VFR BLD CALC: 36.6 % (ref 39–53)
HGB BLD-MCNC: 12.2 G/DL (ref 13–17.5)
LYMPHOCYTES # BLD: 1.4 X10^3/UL (ref 1–4.8)
LYMPHOCYTES NFR BLD AUTO: 25 % (ref 24–48)
MCH RBC QN AUTO: 31 PG (ref 25–35)
MCHC RBC AUTO-ENTMCNC: 33 G/DL (ref 31–37)
MCV RBC AUTO: 91 FL (ref 79–100)
MONO #: 0.5 X10^3/UL (ref 0–1.1)
MONOCYTES NFR BLD: 8 % (ref 0–9)
NEUT #: 3.7 X10^3/UL (ref 1.8–7.7)
NEUTROPHILS NFR BLD AUTO: 64 % (ref 31–73)
PLATELET # BLD AUTO: 174 X10^3/UL (ref 140–400)
POTASSIUM SERPL-SCNC: 4 MMOL/L (ref 3.5–5.1)
RBC # BLD AUTO: 4 X10^6/UL (ref 4.3–5.7)
SODIUM SERPL-SCNC: 143 MMOL/L (ref 136–145)
WBC # BLD AUTO: 5.8 X10^3/UL (ref 4–11)

## 2019-12-30 RX ADMIN — MESALAMINE SCH MG: 400 CAPSULE, DELAYED RELEASE ORAL at 21:23

## 2019-12-30 RX ADMIN — FAMOTIDINE SCH MG: 20 TABLET ORAL at 21:23

## 2019-12-30 RX ADMIN — Medication SCH CAP: at 08:34

## 2019-12-30 RX ADMIN — MESALAMINE SCH MG: 400 CAPSULE, DELAYED RELEASE ORAL at 08:35

## 2019-12-30 RX ADMIN — DAPTOMYCIN SCH MLS/HR: 500 INJECTION, POWDER, LYOPHILIZED, FOR SOLUTION INTRAVENOUS at 13:25

## 2019-12-30 RX ADMIN — OXYCODONE HYDROCHLORIDE AND ACETAMINOPHEN SCH MG: 500 TABLET ORAL at 08:34

## 2019-12-30 RX ADMIN — PIPERACILLIN SODIUM AND TAZOBACTAM SODIUM SCH MLS/HR: 3; .375 INJECTION, POWDER, LYOPHILIZED, FOR SOLUTION INTRAVENOUS at 00:12

## 2019-12-30 RX ADMIN — VANCOMYCIN HYDROCHLORIDE SCH MLS/HR: 1 INJECTION, POWDER, FOR SOLUTION INTRAVENOUS at 04:19

## 2019-12-30 RX ADMIN — OXYCODONE HYDROCHLORIDE AND ACETAMINOPHEN SCH MG: 500 TABLET ORAL at 21:23

## 2019-12-30 RX ADMIN — MESALAMINE SCH MG: 400 CAPSULE, DELAYED RELEASE ORAL at 14:36

## 2019-12-30 RX ADMIN — PIPERACILLIN SODIUM AND TAZOBACTAM SODIUM SCH MLS/HR: 3; .375 INJECTION, POWDER, LYOPHILIZED, FOR SOLUTION INTRAVENOUS at 06:41

## 2019-12-30 RX ADMIN — PIPERACILLIN SODIUM AND TAZOBACTAM SODIUM SCH MLS/HR: 3; .375 INJECTION, POWDER, LYOPHILIZED, FOR SOLUTION INTRAVENOUS at 12:26

## 2019-12-30 RX ADMIN — PIPERACILLIN SODIUM AND TAZOBACTAM SODIUM SCH MLS/HR: 3; .375 INJECTION, POWDER, LYOPHILIZED, FOR SOLUTION INTRAVENOUS at 18:05

## 2019-12-30 RX ADMIN — Medication SCH CAP: at 21:23

## 2019-12-30 NOTE — PDOC
SURGICAL PROGRESS NOTE


Subjective


Pt without new c/o, continued wound drainage


Vital Signs





Vital Signs








  Date Time  Temp Pulse Resp B/P (MAP) Pulse Ox O2 Delivery O2 Flow Rate FiO2


 


12/30/19 07:00 98.1 63 17 107/58 (74) 95 Room Air  





 98.1       


 


12/29/19 22:06       2.0 








I&O











Intake and Output 


 


 12/30/19





 07:00


 


Output Total 1050 ml


 


Balance -1050 ml


 


 


 


Output Urine Total 1050 ml


 


# Voids 2








General:  Alert, Oriented X3, Cooperative, No acute distress


Abdomen:  Soft, Other (ostomy fxn)


Labs





Laboratory Tests








Test


 12/29/19


03:25 12/30/19


03:40 12/30/19


03:46


 


White Blood Count


 5.3 x10^3/uL


(4.0-11.0) 


 5.8 x10^3/uL


(4.0-11.0)


 


Red Blood Count


 4.08 x10^6/uL


(4.30-5.70) 


 4.00 x10^6/uL


(4.30-5.70)


 


Hemoglobin


 12.4 g/dL


(13.0-17.5) 


 12.2 g/dL


(13.0-17.5)


 


Hematocrit


 36.9 %


(39.0-53.0) 


 36.6 %


(39.0-53.0)


 


Mean Corpuscular Volume 91 fL ()   91 fL () 


 


Mean Corpuscular Hemoglobin 30 pg (25-35)   31 pg (25-35) 


 


Mean Corpuscular Hemoglobin


Concent 34 g/dL


(31-37) 


 33 g/dL


(31-37)


 


Red Cell Distribution Width


 13.7 %


(11.5-14.5) 


 13.8 %


(11.5-14.5)


 


Platelet Count


 174 x10^3/uL


(140-400) 


 174 x10^3/uL


(140-400)


 


Neutrophils (%) (Auto) 66 % (31-73)   64 % (31-73) 


 


Lymphocytes (%) (Auto) 22 % (24-48)   25 % (24-48) 


 


Monocytes (%) (Auto) 9 % (0-9)   8 % (0-9) 


 


Eosinophils (%) (Auto) 3 % (0-3)   3 % (0-3) 


 


Basophils (%) (Auto) 1 % (0-3)   0 % (0-3) 


 


Neutrophils # (Auto)


 3.5 x10^3/uL


(1.8-7.7) 


 3.7 x10^3/uL


(1.8-7.7)


 


Lymphocytes # (Auto)


 1.1 x10^3/uL


(1.0-4.8) 


 1.4 x10^3/uL


(1.0-4.8)


 


Monocytes # (Auto)


 0.5 x10^3/uL


(0.0-1.1) 


 0.5 x10^3/uL


(0.0-1.1)


 


Eosinophils # (Auto)


 0.1 x10^3/uL


(0.0-0.7) 


 0.2 x10^3/uL


(0.0-0.7)


 


Basophils # (Auto)


 0.0 x10^3/uL


(0.0-0.2) 


 0.0 x10^3/uL


(0.0-0.2)


 


Sodium Level


 143 mmol/L


(136-145) 143 mmol/L


(136-145) 





 


Potassium Level


 4.0 mmol/L


(3.5-5.1) 4.0 mmol/L


(3.5-5.1) 





 


Chloride Level


 108 mmol/L


() 108 mmol/L


() 





 


Carbon Dioxide Level


 26 mmol/L


(21-32) 25 mmol/L


(21-32) 





 


Anion Gap 9 (6-14)  10 (6-14)  


 


Blood Urea Nitrogen


 11 mg/dL


(8-26) 7 mg/dL (8-26) 


 





 


Creatinine


 1.1 mg/dL


(0.7-1.3) 1.0 mg/dL


(0.7-1.3) 





 


Estimated GFR


(Cockcroft-Gault) 68.3 


 76.2 


 





 


Glucose Level


 135 mg/dL


(70-99) 88 mg/dL


(70-99) 





 


Calcium Level


 8.2 mg/dL


(8.5-10.1) 8.2 mg/dL


(8.5-10.1) 





 


Vancomycin Level Trough


 12.1 mcg/mL


(10.0-20.0) 


 





 


Vancomycin Last Dose Date    


 


Vancomycin Last Dose Time    








Laboratory Tests








Test


 12/30/19


03:40 12/30/19


03:46


 


Sodium Level


 143 mmol/L


(136-145) 





 


Potassium Level


 4.0 mmol/L


(3.5-5.1) 





 


Chloride Level


 108 mmol/L


() 





 


Carbon Dioxide Level


 25 mmol/L


(21-32) 





 


Anion Gap 10 (6-14)  


 


Blood Urea Nitrogen 7 mg/dL (8-26)  


 


Creatinine


 1.0 mg/dL


(0.7-1.3) 





 


Estimated GFR


(Cockcroft-Gault) 76.2 


 





 


Glucose Level


 88 mg/dL


(70-99) 





 


Calcium Level


 8.2 mg/dL


(8.5-10.1) 





 


White Blood Count


 


 5.8 x10^3/uL


(4.0-11.0)


 


Red Blood Count


 


 4.00 x10^6/uL


(4.30-5.70)


 


Hemoglobin


 


 12.2 g/dL


(13.0-17.5)


 


Hematocrit


 


 36.6 %


(39.0-53.0)


 


Mean Corpuscular Volume  91 fL () 


 


Mean Corpuscular Hemoglobin  31 pg (25-35) 


 


Mean Corpuscular Hemoglobin


Concent 


 33 g/dL


(31-37)


 


Red Cell Distribution Width


 


 13.8 %


(11.5-14.5)


 


Platelet Count


 


 174 x10^3/uL


(140-400)


 


Neutrophils (%) (Auto)  64 % (31-73) 


 


Lymphocytes (%) (Auto)  25 % (24-48) 


 


Monocytes (%) (Auto)  8 % (0-9) 


 


Eosinophils (%) (Auto)  3 % (0-3) 


 


Basophils (%) (Auto)  0 % (0-3) 


 


Neutrophils # (Auto)


 


 3.7 x10^3/uL


(1.8-7.7)


 


Lymphocytes # (Auto)


 


 1.4 x10^3/uL


(1.0-4.8)


 


Monocytes # (Auto)


 


 0.5 x10^3/uL


(0.0-1.1)


 


Eosinophils # (Auto)


 


 0.2 x10^3/uL


(0.0-0.7)


 


Basophils # (Auto)


 


 0.0 x10^3/uL


(0.0-0.2)








Problem List


perineal wound, non healing


agree with plans for d/c and outpt f/u with [facility] to consider debridement 

with flap.


will sign off, but please call for questions.











ORI YOUNG MD 30, 2019 09:52

## 2019-12-30 NOTE — PDOC
Infectious Disease Note


Subjective:


Subjective


Comfortable


No F/C/N/V/or increase ostomy output 


says feels stronger





Vital Signs:


Vital Signs





Vital Signs








  Date Time  Temp Pulse Resp B/P (MAP) Pulse Ox O2 Delivery O2 Flow Rate FiO2


 


19 07:00 98.1 63 17 107/58 (74) 95 Room Air  





 98.1       


 


19 22:06       2.0 











Physical Exam:


PHYSICAL EXAM


GENERAL:  Propped up in bed, alert, in no apparent distress.


HEENT:  Pupils equally round.  Oropharynx pink and moist.


NECK:  Supple


LUNGS:  Clear to auscultation.


HEART:  S1, S2.


ABDOMEN:  Obese, soft, nontender, bowel sounds present.  Multiple scars and 

right-sided ostomy.


EXTREMITIES:  No gross edema or cyanosis.


SKIN:  Warm to touch without signs of rash. Several sacrococcygeal fistula, 3 

packed. + drainage, no surrounding redness  


NEUROLOGIC:  Alert and answering questions appropriately.  


PIV





Medications:


Inpatient Meds:





Current Medications








 Medications


  (Trade)  Dose


 Ordered  Sig/Lisa  Start Time


 Stop Time Status Last Admin


Dose Admin


 


 Acetaminophen


  (Tylenol)  500 mg  PRN Q6HRS  PRN  19 08:30


     





 


 Acetaminophen/


 Codeine Phosphate


  (Tylenol #3)  1 tab  PRN Q6HRS  PRN  19 08:30


    19 20:57


1 TAB


 


 Ascorbic Acid


  (Vitamin C)  500 mg  BID  19 21:00


    19 08:34


500 MG


 


 Clindamycin HCl


  (Cleocin)  300 mg  TID  19 14:00


 19 09:30 DC 19 08:39


300 MG


 


 Diphenhydramine


 HCl


  (Benadryl)  25 mg  PRN QHS  PRN  19 09:30


     





 


 Ergocalciferol


  (Vitamin D2)  50,000 unit  QMONTH  20 09:00


     





 


 Famotidine


  (Pepcid)  20 mg  HS  19 21:00


    19 21:06


20 MG


 


 Fentanyl Citrate


  (Fentanyl 2ml


 Vial)  100 mcg  1X  ONCE  19 11:00


 19 11:01 DC 19 10:57


50 MCG


 


 Guaifenesin


  (Robitussin Dm)  5 ml  PRN QID  PRN  19 14:00


     





 


 Iohexol


  (Omnipaque 300


 Mg/ml)  75 ml  1X  ONCE  19 08:00


 19 08:01 DC 19 08:29


75 ML


 


 Lactobacillus


 Rhamnosus


  (Culturelle)  1 cap  BID  19 21:00


    19 08:34


1 CAP


 


 Lidocaine HCl


  (Buffered


 Lidocaine 1%)  3 ml  1X  ONCE  19 11:00


 19 11:01 DC 19 10:58


4.5 ML


 


 Magnesium


 Hydroxide


  (Milk Of


 Magnesia)  2,400 mg  PRN DAILY  PRN  19 14:00


     





 


 Mesalamine


  (Delzicol)  800 mg  TID  19 15:00


    19 08:35


800 MG


 


 Midazolam HCl


  (Versed)  2 mg  1X  ONCE  19 11:00


 19 11:01 DC 19 10:57


1 MG


 


 Morphine Sulfate


  (Morphine


 Sulfate)  2 mg  PRN Q2HR  PRN  19 08:30


     





 


 Non-Formulary


 Medication


  (Protein


 Supplement


  (Nutritional


 Drink Mix))  420 gm  DAILY  19 09:00


   UNV  





 


 Ondansetron HCl


  (Zofran)  4 mg  PRN Q6HRS  PRN  19 09:30


     





 


 Oxycodone/


 Acetaminophen


  (Percocet 5/325)  1 tab  PRN Q4HRS  PRN  19 08:30


    19 21:06


1 TAB


 


 Piperacillin Sod/


 Tazobactam Sod


  (Zosyn Per


 Pharmacy)  1 each  PRN DAILY  PRN  19 13:00


     





 


 Piperacillin Sod/


 Tazobactam Sod


 3.375 gm/Sodium


 Chloride  50 ml @ 


 100 mls/hr  Q6HRS  19 13:15


    19 06:41


100 MLS/HR


 


 Vancomycin HCl


  (Vanco Per


 Pharmacy)  1 each  PRN DAILY  PRN  19 13:00


    19 07:39


1 EACH


 


 Vancomycin HCl


  (Vancomycin


 Trough Level)  1 each  1X  ONCE  19 15:30


 19 15:31   





 


 Vancomycin HCl


 1.5 gm/Sodium


 Chloride  500 ml @ 


 250 mls/hr  Q8H  19 12:00


 19 07:31 DC  





 


 Vancomycin HCl 2


 gm/Sodium Chloride  500 ml @ 


 250 mls/hr  Q12H  19 16:00


    19 04:19


250 MLS/HR











Labs:


Lab





Laboratory Tests








Test


 19


03:40 19


03:46


 


Sodium Level


 143 mmol/L


(136-145) 





 


Potassium Level


 4.0 mmol/L


(3.5-5.1) 





 


Chloride Level


 108 mmol/L


() 





 


Carbon Dioxide Level


 25 mmol/L


(21-32) 





 


Anion Gap 10 (6-14)  


 


Blood Urea Nitrogen 7 mg/dL (8-26)  


 


Creatinine


 1.0 mg/dL


(0.7-1.3) 





 


Estimated GFR


(Cockcroft-Gault) 76.2 


 





 


Glucose Level


 88 mg/dL


(70-99) 





 


Calcium Level


 8.2 mg/dL


(8.5-10.1) 





 


White Blood Count


 


 5.8 x10^3/uL


(4.0-11.0)


 


Red Blood Count


 


 4.00 x10^6/uL


(4.30-5.70)


 


Hemoglobin


 


 12.2 g/dL


(13.0-17.5)


 


Hematocrit


 


 36.6 %


(39.0-53.0)


 


Mean Corpuscular Volume  91 fL () 


 


Mean Corpuscular Hemoglobin  31 pg (25-35) 


 


Mean Corpuscular Hemoglobin


Concent 


 33 g/dL


(31-37)


 


Red Cell Distribution Width


 


 13.8 %


(11.5-14.5)


 


Platelet Count


 


 174 x10^3/uL


(140-400)


 


Neutrophils (%) (Auto)  64 % (31-73) 


 


Lymphocytes (%) (Auto)  25 % (24-48) 


 


Monocytes (%) (Auto)  8 % (0-9) 


 


Eosinophils (%) (Auto)  3 % (0-3) 


 


Basophils (%) (Auto)  0 % (0-3) 


 


Neutrophils # (Auto)


 


 3.7 x10^3/uL


(1.8-7.7)


 


Lymphocytes # (Auto)


 


 1.4 x10^3/uL


(1.0-4.8)


 


Monocytes # (Auto)


 


 0.5 x10^3/uL


(0.0-1.1)


 


Eosinophils # (Auto)


 


 0.2 x10^3/uL


(0.0-0.7)


 


Basophils # (Auto)


 


 0.0 x10^3/uL


(0.0-0.2)








Micro





RUN DATE: 11/15/19                                                              

  PAGE 1   


RUN TIME: 1512


                           Laboratory


                       [address] [place] [address]


                        Otoniel Hudson M.D., Medical Director





----------------------------

----------------------------------------------------------------





PATIENT: ANIVAL DOWD                     ACCT: [id number] LOC:  [facility]     

U: [id number]


                                       AGE/SX: 59/M         ROOM:            

RE19


REG DR:  DONIS GARCÍA DO          :    1960   BED:             

DIS:         


                                       STATUS: REG RCR      TLOC:           


 

--------------------------------------------------------------------------------


------------








SPEC #: [id number]       TENZIN:      STATUS:  COMP           REQ 

#: [id number]


                            RECD:      SUBM DR: DONIS GARCÍA DO          


SOURCE: BUTTOCKS            ENTR:      [facility] DR: CINTIA ARREDONDO    

          


[facility]: WOUND                                                                   

          


ORDERED:  TIM/ALVINA/JEREMY                                                        

 


COMMENTS: BUTTOCKS WOUND                                              


          WOUND CLINIC


 

--------------------------------------------------------------------------------


------------





  Procedure                         Result                                      

         


-------------------------------------

-------------------------------------------------------





  ANAEROBIC-AEROBIC CULTURE  Final  


        Preliminary report


        Final report





  ANAEROBIC RES 1  Final  


        Comment





        No anaerobes recovered in 48 hours.


        No anaerobic growth in 72 hours.





  AEROBIC CULT  Final  


        Preliminary report


        Final report





  AEROBIC RES 1  Final  


        Mixed site claudia.





        4+





  GRAM STAIN  Final  


        Final report





  GRAM STAIN RES 1  Final  


        Comment





        Moderate amount of white blood cells.





  GRAM STAIN RES 2  Final  


        Comment





        Rare gram negative rods.


        Performed at:  [facility], [place], [place]  [id number]


        : YAW Vidales MD, Phone:  [phone]





 

--------------------------------------------------------------------------------


------------


-----------------------

---------------------------------------------------------------------





RUN DATE: 19                  [facility] *LIVE*               

  PAGE 1   


RUN TIME: 1409                            Specimen Inquiry                    


-------------

-------------------------------------------------------------------------------





PATIENT: ANIVAL DOWD                     ACCT: [id number] LOC:  [address] [place] [address]      

U: [id number]


                                       AGE/SX: 59/M         ROOM: 2        

RE19


REG DR:  RANDALL PAULSON III, DO          :    1960   BED:  1          

DIS:         


                                       STATUS: ADM IN       TLOC:           


-------------------------------------------------------------------------------

-------------








SPEC #: [id number]       TENZIN:      STATUS:  RES            REQ 

#: [id number]


                            RECD:      SUBM DR: ALEXANDER VAIL MD          


SOURCE: SACRUM              ENTR:      OT DR: RANDALL PAULSON III, DO          


SPDESC: BIOPSY                                               DONIS GARCÍA STEPHEN J MD


ORDERED:  TIM/ALVINA/JEREMY                                                        

 


COMMENTS: SACRAL BONE TISSUE                                          


----------------------

----------------------------------------------------------------------





  Procedure                         Result                                      

         


 

--------------------------------------------------------------------------------


------------





  ANAEROBIC-AEROBIC CULTURE  


                                PENDING





  ANAEROBIC RES 1  


                                PENDING





  AEROBIC CULT  Preliminary  


        Preliminary report





  AEROBIC RES 1  Preliminary  


        Comment





        No growth in 36 - 48 hours.





  GRAM STAIN  Final  


        Final report





  GRAM STAIN RES 1  Final  


        Comment





        No white blood cells seen.





  GRAM STAIN RES 2  Final  


        No organisms seen





        Performed at:   - [address], [place], [place]  [id number]


        : YAW Vidales MD, Phone:  [phone]





----------------------------

----------------------------------------------------------------








-------------------------------------------------------------

-------------------------------





RUN DATE: 19                  [facility] *LIVE*               

  PAGE 1   


RUN TIME: 1410                            Specimen Inquiry                    


---------------------------------------------------

-----------------------------------------





PATIENT: ANIVAL DOWD EUGENE                     ACCT: [id number] LOC:  [address] [place] [address]      

U: [id number]


                                       AGE/SX: 59/M         ROOM: 562        

RE19


REG DR:  RANDALL PAULSON III DO          :    1960   BED:  1          

DIS:         


                                       STATUS: ADM IN       TLOC:           


 

--------------------------------------------------------------------------------


------------








SPEC #: [id number]       TENZIN:      STATUS:  COMP           REQ 

#: [id number]


                            RECD:      SUBM DR: RANDALL PAULSON III, DO          


SOURCE: SACRUM              ENTR:      [facility] DR: DONIS GARCÍA DO          


SPDESC: WOUND                                                                   

          


ORDERED:  ANAER/AEROB/GS                                                        

 


COMMENTS: SACRAL WOUND                                                


------------

--------------------------------------------------------------------------------





  Procedure                         Result                                      

         


 

--------------------------------------------------------------------------------


------------





  ANAEROBIC-AEROBIC CULTURE  Final  


        Final report





  ANAEROBIC RES 1  Final  


        Comment





        No anaerobic growth in 72 hours.





  AEROBIC CULT  Final  


        Preliminary report


        Final report





  AEROBIC RES 1  Final  


        Mixed site claudia.





        2+





  GRAM STAIN  Final  


        Final report





  GRAM STAIN RES 1  Final  


        Comment





        No white blood cells seen.





  GRAM STAIN RES 2  Final  


        No organisms seen





        Performed at:   - [facility], [place], [place]  [id number]


        : YAW Vidales MD, Phone:  [phone]





-----------

--------------------------------------------------------------------------------


-





Objective:


Assessment:


 


1.   Acute and chronic OM changes on CT abdomen and pelvis ,Pt has been treated 

with previous OM at [facility] in the past . Chronic OM could be from the same


ESR 35


S/P IR biopsy  


Cult neg so far


2.  Crohn's disease with history of ostomy and multiple fistula repairs.


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 Wound Cults

negative from 2019


4. Gastroesophageal reflux disease.


4.  Obesity.


5.  Kidney stone on CT Abdomen





Plan:


Plan of Care


Cont zosyn


DC Vanc


start daptomycin


PICC line  


f/u cults


Probiotics 


Bedrest P500 bed


Ideally would need  I and D of acute om, antibiotics alone will not be optimal


Recommended transfer to [facility] for I and D, ostectomy and possible flap 

if eligible


appears that Transfer  was denied


Continue antibiotics


Pt will need referral as outpt











ALEXANDER VAIL MD           Dec 30, 2019 10:37

## 2019-12-30 NOTE — NUR
Wound Care

Wound care follow up for coccyx fistulas. Cleansed area and repacked with betadine soaked 
gauze strips and covered with ABD's and tape. No other wounds noted. Pt will follow up with 
 wound center for plastic surgeon consult. WC will continue to follow until he is able to 
get in.

## 2019-12-30 NOTE — PDOC
[employer] PROGRESS NOTE


Chief Complaint


Chief Complaint


Non-surgical perianal wound - with bleeding


Severe Crohn's with multiple surgeries s/p ostomy (multiple fissures and 

abdominal wounds)


Chronic pain


Hyperlipidemia


Hypertension


GERD


Incidental 5 mm rt kidney stone


Hiatal hernia with small bowel loops





History of Present Illness


History of Present Illness


12/30/19


Pt seen and examined


Pt was sitting in chair and pleasant


Pt ambulated w/out difficulty


DW pt about their care


DW RN


Reviewed pt's chart





Vitals/I&O


Vitals/I&O:





                                   Vital Signs








  Date Time  Temp Pulse Resp B/P (MAP) Pulse Ox O2 Delivery O2 Flow Rate FiO2


 


12/30/19 07:00 98.1 63 17 107/58 (74) 95 Room Air  





 98.1       


 


12/29/19 22:06       2.0 














                                    I & O   


 


 12/29/19 12/29/19 12/30/19





 15:00 23:00 07:00


 


Output Total 550 ml  500 ml


 


Balance -550 ml  -500 ml











Physical Exam


Physical Exam:





General:  Alert, Oriented X3, Cooperative, No acute distress


Heart:  Regular rate, Normal S1, Normal S2


Lungs:  Clear


Abdomen:  Normal bowel sounds, Soft, Other (Multiple scars and right-sided 

ostomy.)


Extremities:  No clubbing, No cyanosis


Skin:  No rashes, Other ( Several sacrococcygeal fistula, 3 packed. + drainage, 

no surrounding redness  )





Labs


Labs:





Laboratory Tests








Test


 12/30/19


03:40 12/30/19


03:46


 


Sodium Level


 143 mmol/L


(136-145) 





 


Potassium Level


 4.0 mmol/L


(3.5-5.1) 





 


Chloride Level


 108 mmol/L


() 





 


Carbon Dioxide Level


 25 mmol/L


(21-32) 





 


Anion Gap 10 (6-14)  


 


Blood Urea Nitrogen 7 mg/dL (8-26)  


 


Creatinine


 1.0 mg/dL


(0.7-1.3) 





 


Estimated GFR


(Cockcroft-Gault) 76.2 


 





 


Glucose Level


 88 mg/dL


(70-99) 





 


Calcium Level


 8.2 mg/dL


(8.5-10.1) 





 


White Blood Count


 


 5.8 x10^3/uL


(4.0-11.0)


 


Red Blood Count


 


 4.00 x10^6/uL


(4.30-5.70)


 


Hemoglobin


 


 12.2 g/dL


(13.0-17.5)


 


Hematocrit


 


 36.6 %


(39.0-53.0)


 


Mean Corpuscular Volume  91 fL () 


 


Mean Corpuscular Hemoglobin  31 pg (25-35) 


 


Mean Corpuscular Hemoglobin


Concent 


 33 g/dL


(31-37)


 


Red Cell Distribution Width


 


 13.8 %


(11.5-14.5)


 


Platelet Count


 


 174 x10^3/uL


(140-400)


 


Neutrophils (%) (Auto)  64 % (31-73) 


 


Lymphocytes (%) (Auto)  25 % (24-48) 


 


Monocytes (%) (Auto)  8 % (0-9) 


 


Eosinophils (%) (Auto)  3 % (0-3) 


 


Basophils (%) (Auto)  0 % (0-3) 


 


Neutrophils # (Auto)


 


 3.7 x10^3/uL


(1.8-7.7)


 


Lymphocytes # (Auto)


 


 1.4 x10^3/uL


(1.0-4.8)


 


Monocytes # (Auto)


 


 0.5 x10^3/uL


(0.0-1.1)


 


Eosinophils # (Auto)


 


 0.2 x10^3/uL


(0.0-0.7)


 


Basophils # (Auto)


 


 0.0 x10^3/uL


(0.0-0.2)











Review of Systems


Review of Systems:


No c/o headache


No c/o CP





Assessment and Plan


Assessmemt and Plan


Assessment


Non-surgical perianal wound - with bleeding


Severe Crohn's with multiple surgeries s/p ostomy (multiple fissures and 

abdominal wounds)


Chronic pain


Hyperlipidemia


Hypertension


GERD


Incidental 5 mm rt kidney stone


Hiatal hernia with small bowel loops





Plan


Continue Abx


Wound care


Await cultures


Hopeful for I&D at [facility]


Labs


DVT Prophylaxis


PT/OT


Home meds


Full code


Appreciate subspecialist input





Comment


Review of Relevant


I have reviewed the following items crow (where applicable) has been applied.


Medications:





Current Medications








 Medications


  (Trade)  Dose


 Ordered  Sig/Lisa


 Route


 PRN Reason  Start Time


 Stop Time Status Last Admin


Dose Admin


 


 Vancomycin HCl 2


 gm/Sodium Chloride  500 ml @ 


 250 mls/hr  Q12H


 IV


   12/29/19 16:00


    12/30/19 04:19




















RANDALL PAULSON III DO           Dec 30, 2019 11:14

## 2019-12-31 VITALS — SYSTOLIC BLOOD PRESSURE: 114 MMHG | DIASTOLIC BLOOD PRESSURE: 57 MMHG

## 2019-12-31 VITALS — DIASTOLIC BLOOD PRESSURE: 62 MMHG | SYSTOLIC BLOOD PRESSURE: 118 MMHG

## 2019-12-31 VITALS — SYSTOLIC BLOOD PRESSURE: 105 MMHG | DIASTOLIC BLOOD PRESSURE: 65 MMHG

## 2019-12-31 VITALS — DIASTOLIC BLOOD PRESSURE: 55 MMHG | SYSTOLIC BLOOD PRESSURE: 103 MMHG

## 2019-12-31 VITALS — DIASTOLIC BLOOD PRESSURE: 67 MMHG | SYSTOLIC BLOOD PRESSURE: 116 MMHG

## 2019-12-31 VITALS — DIASTOLIC BLOOD PRESSURE: 55 MMHG | SYSTOLIC BLOOD PRESSURE: 110 MMHG

## 2019-12-31 LAB
ANION GAP SERPL CALC-SCNC: 10 MMOL/L (ref 6–14)
BUN SERPL-MCNC: 8 MG/DL (ref 8–26)
CALCIUM SERPL-MCNC: 8.8 MG/DL (ref 8.5–10.1)
CHLORIDE SERPL-SCNC: 108 MMOL/L (ref 98–107)
CO2 SERPL-SCNC: 25 MMOL/L (ref 21–32)
CREAT SERPL-MCNC: 1.2 MG/DL (ref 0.7–1.3)
GFR SERPLBLD BASED ON 1.73 SQ M-ARVRAT: 61.8 ML/MIN
GLUCOSE SERPL-MCNC: 96 MG/DL (ref 70–99)
POTASSIUM SERPL-SCNC: 4.1 MMOL/L (ref 3.5–5.1)
SODIUM SERPL-SCNC: 143 MMOL/L (ref 136–145)

## 2019-12-31 RX ADMIN — PIPERACILLIN SODIUM AND TAZOBACTAM SODIUM SCH MLS/HR: 3; .375 INJECTION, POWDER, LYOPHILIZED, FOR SOLUTION INTRAVENOUS at 05:58

## 2019-12-31 RX ADMIN — PIPERACILLIN SODIUM AND TAZOBACTAM SODIUM SCH MLS/HR: 3; .375 INJECTION, POWDER, LYOPHILIZED, FOR SOLUTION INTRAVENOUS at 17:08

## 2019-12-31 RX ADMIN — DAPTOMYCIN SCH MLS/HR: 500 INJECTION, POWDER, LYOPHILIZED, FOR SOLUTION INTRAVENOUS at 12:13

## 2019-12-31 RX ADMIN — MESALAMINE SCH MG: 400 CAPSULE, DELAYED RELEASE ORAL at 15:06

## 2019-12-31 RX ADMIN — PIPERACILLIN SODIUM AND TAZOBACTAM SODIUM SCH MLS/HR: 3; .375 INJECTION, POWDER, LYOPHILIZED, FOR SOLUTION INTRAVENOUS at 00:00

## 2019-12-31 RX ADMIN — PIPERACILLIN SODIUM AND TAZOBACTAM SODIUM SCH MLS/HR: 3; .375 INJECTION, POWDER, LYOPHILIZED, FOR SOLUTION INTRAVENOUS at 23:55

## 2019-12-31 RX ADMIN — FAMOTIDINE SCH MG: 20 TABLET ORAL at 21:17

## 2019-12-31 RX ADMIN — MESALAMINE SCH MG: 400 CAPSULE, DELAYED RELEASE ORAL at 21:17

## 2019-12-31 RX ADMIN — OXYCODONE HYDROCHLORIDE AND ACETAMINOPHEN SCH MG: 500 TABLET ORAL at 09:04

## 2019-12-31 RX ADMIN — Medication SCH CAP: at 21:18

## 2019-12-31 RX ADMIN — Medication SCH CAP: at 09:04

## 2019-12-31 RX ADMIN — PIPERACILLIN SODIUM AND TAZOBACTAM SODIUM SCH MLS/HR: 3; .375 INJECTION, POWDER, LYOPHILIZED, FOR SOLUTION INTRAVENOUS at 12:13

## 2019-12-31 RX ADMIN — MESALAMINE SCH MG: 400 CAPSULE, DELAYED RELEASE ORAL at 09:04

## 2019-12-31 RX ADMIN — OXYCODONE HYDROCHLORIDE AND ACETAMINOPHEN SCH MG: 500 TABLET ORAL at 21:17

## 2019-12-31 NOTE — NUR
ED phoned and faxed IV abx orders to [facility]. Awaiting to hear back on 
benefits.

Pt will need PICC line placement too. Discussed with RN.

## 2019-12-31 NOTE — PDOC
[employer] PROGRESS NOTE


Chief Complaint


Chief Complaint


Non-surgical perianal wound - with bleeding


Severe Crohn's with multiple surgeries s/p ostomy (multiple fissures and 

abdominal wounds)


Chronic pain


Hyperlipidemia


Hypertension


GERD


Incidental 5 mm rt kidney stone


Hiatal hernia with small bowel loops





History of Present Illness


History of Present Illness


12/30/19


Pt seen and examined


Pt was sitting in chair and pleasant


Pt ambulated w/out difficulty


DW pt about their care


DW RN


Reviewed pt's chart 





 12/31/19


Patient seen and examined


Chart reviewed


Discussed with RN





Vitals/I&O


Vitals/I&O:





                                   Vital Signs








  Date Time  Temp Pulse Resp B/P (MAP) Pulse Ox O2 Delivery O2 Flow Rate FiO2


 


12/31/19 11:00  64 18 110/55 (73) 98 Room Air  


 


12/31/19 08:00       2.0 


 


12/31/19 07:00 98.1       





 98.1       














                                    I & O   


 


 12/30/19 12/30/19 12/31/19





 15:00 23:00 07:00


 


Intake Total  350 ml 580 ml


 


Output Total 250 ml  


 


Balance -250 ml 350 ml 580 ml











Physical Exam


Physical Exam:


GENERAL:  Propped up in bed, alert, in no apparent distress.


HEENT:  Pupils equally round.  Oropharynx pink and moist.


NECK:  Supple, no lymphadenopathy.


LUNGS:  Clear to auscultation.


HEART:  S1, S2.


ABDOMEN:  Obese, soft, nontender, bowel sounds present.  Multiple scars and 

right-sided ostomy.


EXTREMITIES:  No gross edema or cyanosis.


SKIN:  Warm to touch without signs of rash. Several sacrococcygeal fistulas . 


NEUROLOGIC:  Alert and answering questions appropriately.  


PIV


General:  Alert, Oriented X3, Cooperative, No acute distress


Heart:  Regular rate, Normal S1, Normal S2


Lungs:  Clear


Abdomen:  Normal bowel sounds, Soft, Other (Multiple scars and right-sided 

ostomy.)


Extremities:  No clubbing, No cyanosis


Skin:  No rashes, Other ( Several sacrococcygeal fistula, 3 packed. + drainage, 

no surrounding redness  )





Labs


Labs:





Laboratory Tests








Test


 12/31/19


07:19


 


Sodium Level


 143 mmol/L


(136-145)


 


Potassium Level


 4.1 mmol/L


(3.5-5.1)


 


Chloride Level


 108 mmol/L


()


 


Carbon Dioxide Level


 25 mmol/L


(21-32)


 


Anion Gap 10 (6-14) 


 


Blood Urea Nitrogen 8 mg/dL (8-26) 


 


Creatinine


 1.2 mg/dL


(0.7-1.3)


 


Estimated GFR


(Cockcroft-Gault) 61.8 





 


Glucose Level


 96 mg/dL


(70-99)


 


Calcium Level


 8.8 mg/dL


(8.5-10.1)











Assessment and Plan


Assessmemt and Plan


Non-surgical perianal wound - with bleeding


Severe Crohn's with multiple surgeries s/p ostomy (multiple fissures and 

abdominal wounds)


Chronic pain


Hyperlipidemia


Hypertension


GERD


Incidental 5 mm rt kidney stone


Hiatal hernia with small bowel loops





Plan


IV antibiotics


Wound care


Home meds


PT OT


DVT prophylaxis


Discharge when okay with subspecialist





Comment


Review of Relevant


I have reviewed the following items crow (where applicable) has been applied.











RANDALL PAULSON III DO           Dec 31, 2019 13:55

## 2019-12-31 NOTE — PDOC
Infectious Disease Note


Subjective:


Subjective


Pt doing well


No F/C/N/V/or increase ostomy output 


says feels stronger


has been having dry skin on legs





Vital Signs:


Vital Signs





Vital Signs








  Date Time  Temp Pulse Resp B/P (MAP) Pulse Ox O2 Delivery O2 Flow Rate FiO2


 


19 08:00      Room Air 2.0 


 


19 07:00 98.1 67 18 114/57 (76) 92   





 98.1       











Physical Exam:


PHYSICAL EXAM


GENERAL:  Propped up in bed, alert, in no apparent distress.


HEENT:  Pupils equally round.  Oropharynx pink and moist.


NECK:  Supple, no lymphadenopathy.


LUNGS:  Clear to auscultation.


HEART:  S1, S2.


ABDOMEN:  Obese, soft, nontender, bowel sounds present.  Multiple scars and 

right-sided ostomy.


EXTREMITIES:  No gross edema or cyanosis.


SKIN:  Warm to touch without signs of rash. Several sacrococcygeal fistulas . 


NEUROLOGIC:  Alert and answering questions appropriately.  


PIV





Medications:


Inpatient Meds:





Current Medications








 Medications


  (Trade)  Dose


 Ordered  Sig/Lisa  Start Time


 Stop Time Status Last Admin


Dose Admin


 


 Acetaminophen


  (Tylenol)  500 mg  PRN Q6HRS  PRN  19 08:30


     





 


 Acetaminophen/


 Codeine Phosphate


  (Tylenol #3)  1 tab  PRN Q6HRS  PRN  19 08:30


    19 20:57


1 TAB


 


 Ascorbic Acid


  (Vitamin C)  500 mg  BID  19 21:00


    19 09:04


500 MG


 


 Clindamycin HCl


  (Cleocin)  300 mg  TID  19 14:00


 19 09:30 DC 19 08:39


300 MG


 


 Daptomycin 530 mg/


 Sodium Chloride  50 ml @ 


 100 mls/hr  Q24H  19 12:30


    19 13:25


100 MLS/HR


 


 Diphenhydramine


 HCl


  (Benadryl)  25 mg  PRN QHS  PRN  19 09:30


     





 


 Ergocalciferol


  (Vitamin D2)  50,000 unit  QMONTH  20 09:00


     





 


 Famotidine


  (Pepcid)  20 mg  HS  19 21:00


    19 21:23


20 MG


 


 Fentanyl Citrate


  (Fentanyl 2ml


 Vial)  100 mcg  1X  ONCE  19 11:00


 19 11:01 DC 19 10:57


50 MCG


 


 Guaifenesin


  (Robitussin Dm)  5 ml  PRN QID  PRN  19 14:00


     





 


 Iohexol


  (Omnipaque 300


 Mg/ml)  75 ml  1X  ONCE  19 08:00


 19 08:01 DC 19 08:29


75 ML


 


 Lactobacillus


 Rhamnosus


  (Culturelle)  1 cap  BID  19 21:00


    19 09:04


1 CAP


 


 Lidocaine HCl


  (Buffered


 Lidocaine 1%)  3 ml  1X  ONCE  19 11:00


 19 11:01 DC 19 10:58


4.5 ML


 


 Magnesium


 Hydroxide


  (Milk Of


 Magnesia)  2,400 mg  PRN DAILY  PRN  19 14:00


     





 


 Mesalamine


  (Delzicol)  800 mg  TID  19 15:00


    19 09:04


800 MG


 


 Midazolam HCl


  (Versed)  2 mg  1X  ONCE  19 11:00


 19 11:01 DC 19 10:57


1 MG


 


 Morphine Sulfate


  (Morphine


 Sulfate)  2 mg  PRN Q2HR  PRN  19 08:30


    19 16:07


2 MG


 


 Non-Formulary


 Medication


  (Protein


 Supplement


  (Nutritional


 Drink Mix))  420 gm  DAILY  19 09:00


   UNV  





 


 Ondansetron HCl


  (Zofran)  4 mg  PRN Q6HRS  PRN  19 09:30


     





 


 Oxycodone/


 Acetaminophen


  (Percocet 5/325)  1 tab  PRN Q4HRS  PRN  19 08:30


    19 21:06


1 TAB


 


 Piperacillin Sod/


 Tazobactam Sod


  (Zosyn Per


 Pharmacy)  1 each  PRN DAILY  PRN  19 13:00


     





 


 Piperacillin Sod/


 Tazobactam Sod


 3.375 gm/Sodium


 Chloride  50 ml @ 


 100 mls/hr  Q6HRS  19 13:15


    19 05:58


100 MLS/HR


 


 Vancomycin HCl


  (Vanco Per


 Pharmacy)  1 each  PRN DAILY  PRN  19 13:00


 19 11:36 DC 19 07:39


1 EACH


 


 Vancomycin HCl


  (Vancomycin


 Trough Level)  1 each  1X  ONCE  19 15:30


 19 11:36 DC  





 


 Vancomycin HCl


 1.5 gm/Sodium


 Chloride  500 ml @ 


 250 mls/hr  Q8H  19 12:00


 19 07:31 DC  





 


 Vancomycin HCl 2


 gm/Sodium Chloride  500 ml @ 


 250 mls/hr  Q12H  19 16:00


 19 11:36 DC 19 04:19


250 MLS/HR











Labs:


Lab





Laboratory Tests








Test


 19


07:19


 


Sodium Level


 143 mmol/L


(136-145)


 


Potassium Level


 4.1 mmol/L


(3.5-5.1)


 


Chloride Level


 108 mmol/L


()


 


Carbon Dioxide Level


 25 mmol/L


(21-32)


 


Anion Gap 10 (6-14) 


 


Blood Urea Nitrogen 8 mg/dL (8-26) 


 


Creatinine


 1.2 mg/dL


(0.7-1.3)


 


Estimated GFR


(Cockcroft-Gault) 61.8 





 


Glucose Level


 96 mg/dL


(70-99)


 


Calcium Level


 8.8 mg/dL


(8.5-10.1)








Micro





-----

--------------------------------------------------------------------------------


-------





RUN DATE: 19                  [facility] *LIVE*               

  PAGE 1   


RUN TIME: 1210                            Specimen Inquiry                    


 

--------------------------------------------------------------------------------


------------





PATIENT: ANIVAL DOWD                     ACCT: [id number] LOC:  [address] [place] [address]      

U: [id number]


                                       AGE/SX: 59/M         ROOM: 562        

RE19


REG DR:  RANDALL PAULSON III, DO          :    1960   BED:  1          

DIS:         


                                       STATUS: ADM IN       TLOC:           


-------------------------------------------------------------

-------------------------------








SPEC #: [id number]       TENZIN:      STATUS:  RES            REQ 

#: [id number]


                            RECD:      SUBM DR: ALEXANDER VAIL MD          


SOURCE: [facility]              ENTR:      OT DR: RANDALL PAULSON III, DO          


SPDESC: BIOPSY                                               DONIS GARCÍA STEPHEN J MD


ORDERED:  ANAER/AEROB/JEREMY                                                        

 


COMMENTS: SACRAL BONE TISSUE                                          


----

--------------------------------------------------------------------------------


--------





  Procedure                         Result                                      

         


---------------------------------------------------------------------------

-----------------





  ANAEROBIC-AEROBIC CULTURE  


                                PENDING





  ANAEROBIC RES 1  


                                PENDING





  AEROBIC CULT  Final  


        Preliminary report


        Final report





  AEROBIC RES 1  Final  


        Comment





        No growth in 36 - 48 hours.


        No growth in 56 - 72 hours.





  GRAM STAIN  Final  


        Final report





  GRAM STAIN RES 1  Final  


        Comment





        No white blood cells seen.





  GRAM STAIN RES 2  Final  


        No organisms seen





        Performed at:  [facility] [address], [place], [place]  [id number]


        : YAW Vidales MD, Phone:  [phone]





 

--------------------------------------------------------------------------------


------------

















 

--------------------------------------------------------------------------------


------------





RUN DATE: 19                  [facility] *LIVE*               

  PAGE 1   


RUN TIME: 1410                            Specimen Inquiry                    


 

--------------------------------------------------------------------------------


------------





PATIENT: ANIVAL DOWD                     ACCT: [id number] LOC:  [address] [place] [address]      

U: [id number]


                                       AGE/SX: 59/M         ROOM: 562        

RE19


REG DR:  RANDALL PAULSON III, DO          :    1960   BED:  1          

DIS:         


                                       STATUS: ADM IN       TLOC:           


---------------------------------------------------

-----------------------------------------








SPEC #: [id number]       TENZIN:      STATUS:  COMP           REQ 

#: [id number]


                            RECD: 19-     SUBM DR: RANDALL PAULSON III, DO          


SOURCE: [facility]              ENTR:      MELVIN DR: DONIS GARCÍA DO          


[facility]: WOUND                                                                   

          


ORDERED:  ANAER/AEROB/GS                                                        

 


COMMENTS: SACRAL WOUND                                                


--------------------------------------------------------------------------

------------------





  Procedure                         Result                                      

         


-----------------

---------------------------------------------------------------------------





  ANAEROBIC-AEROBIC CULTURE  Final  


        Final report





  ANAEROBIC RES 1  Final  


        Comment





        No anaerobic growth in 72 hours.





  AEROBIC CULT  Final  


        Preliminary report


        Final report





  AEROBIC RES 1  Final  


        Mixed site claudia.





        2+





  GRAM STAIN  Final  


        Final report





  GRAM STAIN RES 1  Final  


        Comment





        No white blood cells seen.





  GRAM STAIN RES 2  Final  


        No organisms seen





        Performed at:   - [facility], [place], [place]  [id number]


        : YAW Vidales MD, Phone:  [phone]





-------------------------------------------------------------------------

-------------------





Objective:


Assessment:


 


1.   Acute and chronic OM changes on CT abdomen and pelvis ,Pt has been treated 

with previous OM at [facility] in the past . Chronic OM could be from the same


ESR 35


S/P IR biopsy  


Cult neg so far


2.  Crohn's disease with history of ostomy and multiple fistula repairs.


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 Wound Cults

negative from 2019


4. Gastroesophageal reflux disease.


4.  Obesity.


5.  Kidney stone on CT Abdomen





Plan:


Plan of Care


Cont zosyn/ daptomycin


PICC line  


social service to assist with discharge antibiotics


script in chart


weekly labs CBCD/BUN/CREAT/CPK/ESR/LFT


WOUND CARE per wound team


f/u cults


Probiotics 


Bedrest P500 bed


Ideally would need  I and D of acute om, antibiotics alone will not be optimal


Recommended transfer to [facility] for I and D, ostectomy and possible flap 

if eligible


appears that Transfer  was denied


f/u cult till final





D/W ALEXANDER SANCHEZ MD           Dec 31, 2019 11:13

## 2020-01-01 VITALS — SYSTOLIC BLOOD PRESSURE: 123 MMHG | DIASTOLIC BLOOD PRESSURE: 86 MMHG

## 2020-01-01 VITALS — SYSTOLIC BLOOD PRESSURE: 107 MMHG | DIASTOLIC BLOOD PRESSURE: 63 MMHG

## 2020-01-01 VITALS — DIASTOLIC BLOOD PRESSURE: 55 MMHG | SYSTOLIC BLOOD PRESSURE: 101 MMHG

## 2020-01-01 VITALS — DIASTOLIC BLOOD PRESSURE: 66 MMHG | SYSTOLIC BLOOD PRESSURE: 118 MMHG

## 2020-01-01 VITALS — DIASTOLIC BLOOD PRESSURE: 57 MMHG | SYSTOLIC BLOOD PRESSURE: 95 MMHG

## 2020-01-01 VITALS — DIASTOLIC BLOOD PRESSURE: 66 MMHG | SYSTOLIC BLOOD PRESSURE: 114 MMHG

## 2020-01-01 LAB
ANION GAP SERPL CALC-SCNC: 9 MMOL/L (ref 6–14)
BUN SERPL-MCNC: 15 MG/DL (ref 8–26)
CALCIUM SERPL-MCNC: 8.8 MG/DL (ref 8.5–10.1)
CHLORIDE SERPL-SCNC: 106 MMOL/L (ref 98–107)
CO2 SERPL-SCNC: 26 MMOL/L (ref 21–32)
CREAT SERPL-MCNC: 1.1 MG/DL (ref 0.7–1.3)
GFR SERPLBLD BASED ON 1.73 SQ M-ARVRAT: 68.3 ML/MIN
GLUCOSE SERPL-MCNC: 104 MG/DL (ref 70–99)
POTASSIUM SERPL-SCNC: 4 MMOL/L (ref 3.5–5.1)
SODIUM SERPL-SCNC: 141 MMOL/L (ref 136–145)

## 2020-01-01 RX ADMIN — OXYCODONE HYDROCHLORIDE AND ACETAMINOPHEN PRN TAB: 5; 325 TABLET ORAL at 16:44

## 2020-01-01 RX ADMIN — FAMOTIDINE SCH MG: 20 TABLET ORAL at 20:43

## 2020-01-01 RX ADMIN — Medication SCH CAP: at 20:43

## 2020-01-01 RX ADMIN — PIPERACILLIN SODIUM AND TAZOBACTAM SODIUM SCH MLS/HR: 3; .375 INJECTION, POWDER, LYOPHILIZED, FOR SOLUTION INTRAVENOUS at 12:51

## 2020-01-01 RX ADMIN — PIPERACILLIN SODIUM AND TAZOBACTAM SODIUM SCH MLS/HR: 3; .375 INJECTION, POWDER, LYOPHILIZED, FOR SOLUTION INTRAVENOUS at 05:43

## 2020-01-01 RX ADMIN — MESALAMINE SCH MG: 400 CAPSULE, DELAYED RELEASE ORAL at 12:48

## 2020-01-01 RX ADMIN — MESALAMINE SCH MG: 400 CAPSULE, DELAYED RELEASE ORAL at 20:43

## 2020-01-01 RX ADMIN — DAPTOMYCIN SCH MLS/HR: 500 INJECTION, POWDER, LYOPHILIZED, FOR SOLUTION INTRAVENOUS at 12:51

## 2020-01-01 RX ADMIN — OXYCODONE HYDROCHLORIDE AND ACETAMINOPHEN SCH MG: 500 TABLET ORAL at 08:52

## 2020-01-01 RX ADMIN — PIPERACILLIN SODIUM AND TAZOBACTAM SODIUM SCH MLS/HR: 3; .375 INJECTION, POWDER, LYOPHILIZED, FOR SOLUTION INTRAVENOUS at 23:54

## 2020-01-01 RX ADMIN — MESALAMINE SCH MG: 400 CAPSULE, DELAYED RELEASE ORAL at 08:52

## 2020-01-01 RX ADMIN — Medication SCH CAP: at 08:52

## 2020-01-01 RX ADMIN — OXYCODONE HYDROCHLORIDE AND ACETAMINOPHEN SCH MG: 500 TABLET ORAL at 20:43

## 2020-01-01 RX ADMIN — PIPERACILLIN SODIUM AND TAZOBACTAM SODIUM SCH MLS/HR: 3; .375 INJECTION, POWDER, LYOPHILIZED, FOR SOLUTION INTRAVENOUS at 17:44

## 2020-01-01 RX ADMIN — OXYCODONE HYDROCHLORIDE AND ACETAMINOPHEN PRN TAB: 5; 325 TABLET ORAL at 08:53

## 2020-01-01 NOTE — PDOC
Infectious Disease Note


Subjective:


Subjective


Pt without complaints


feels stronger


No F/C/N/V/or increase ostomy output 


says feels stronger


has been having dry skin on legs





Vital Signs:


Vital Signs





Vital Signs








  Date Time  Temp Pulse Resp B/P (MAP) Pulse Ox O2 Delivery O2 Flow Rate FiO2


 


20 11:00 97.5 67 16 114/66 (82) 96 Room Air  





 97.5       


 


19 08:00       2.0 











Physical Exam:


PHYSICAL EXAM


GENERAL:  Propped up in bed, alert, in no apparent distress.


HEENT:  Pupils equally round.  Oropharynx pink and moist.


NECK:  Supple, no lymphadenopathy.


LUNGS:  Clear to auscultation.


HEART:  S1, S2.


ABDOMEN:  Obese, soft, nontender, bowel sounds present.  Multiple scars and 

right-sided ostomy.


EXTREMITIES:  No gross edema or cyanosis.


SKIN:  Warm to touch without signs of rash. Several sacrococcygeal fistulas . 


NEUROLOGIC:  Alert and answering questions appropriately.  


PIV





Medications:


Inpatient Meds:





Current Medications








 Medications


  (Trade)  Dose


 Ordered  Sig/Lisa  Start Time


 Stop Time Status Last Admin


Dose Admin


 


 Acetaminophen


  (Tylenol)  500 mg  PRN Q6HRS  PRN  19 08:30


     





 


 Acetaminophen/


 Codeine Phosphate


  (Tylenol #3)  1 tab  PRN Q6HRS  PRN  19 08:30


    19 20:57





 


 Ascorbic Acid


  (Vitamin C)  500 mg  BID  19 21:00


    20 08:52





 


 Clindamycin HCl


  (Cleocin)  300 mg  TID  19 14:00


 19 09:30 DC 19 08:39





 


 Daptomycin 530 mg/


 Sodium Chloride  50 ml @ 


 100 mls/hr  Q24H  19 12:30


    19 12:13





 


 Diphenhydramine


 HCl


  (Benadryl)  25 mg  PRN QHS  PRN  19 09:30


     





 


 Ergocalciferol


  (Vitamin D2)  50,000 unit  QMONTH  20 09:00


     





 


 Famotidine


  (Pepcid)  20 mg  HS  19 21:00


    19 21:17





 


 Fentanyl Citrate


  (Fentanyl 2ml


 Vial)  100 mcg  1X  ONCE  19 11:00


 19 11:01 DC 19 10:57





 


 Guaifenesin


  (Robitussin Dm)  5 ml  PRN QID  PRN  19 14:00


     





 


 Iohexol


  (Omnipaque 300


 Mg/ml)  75 ml  1X  ONCE  19 08:00


 19 08:01 DC 19 08:29





 


 Lactobacillus


 Rhamnosus


  (Culturelle)  1 cap  BID  19 21:00


    20 08:52





 


 Lidocaine HCl


  (Buffered


 Lidocaine 1%)  6 ml  1X  ONCE  19 14:30


 19 14:31 DC  





 


 Magnesium


 Hydroxide


  (Milk Of


 Magnesia)  2,400 mg  PRN DAILY  PRN  19 14:00


     





 


 Mesalamine


  (Delzicol)  800 mg  TID  19 15:00


    20 08:52





 


 Midazolam HCl


  (Versed)  2 mg  1X  ONCE  19 11:00


 19 11:01 DC 19 10:57





 


 Morphine Sulfate


  (Morphine


 Sulfate)  2 mg  PRN Q2HR  PRN  19 08:30


    19 16:07





 


 Non-Formulary


 Medication


  (Protein


 Supplement


  (Nutritional


 Drink Mix))  420 gm  DAILY  19 09:00


   UNV  





 


 Ondansetron HCl


  (Zofran)  4 mg  PRN Q6HRS  PRN  19 09:30


     





 


 Oxycodone/


 Acetaminophen


  (Percocet 5/325)  1 tab  PRN Q4HRS  PRN  19 08:30


    20 08:53





 


 Piperacillin Sod/


 Tazobactam Sod


  (Zosyn Per


 Pharmacy)  1 each  PRN DAILY  PRN  19 13:00


     





 


 Piperacillin Sod/


 Tazobactam Sod


 3.375 gm/Sodium


 Chloride  50 ml @ 


 100 mls/hr  Q6HRS  19 13:15


    20 05:43





 


 Vancomycin HCl


  (Vanco Per


 Pharmacy)  1 each  PRN DAILY  PRN  19 13:00


 19 11:36 DC 19 07:39





 


 Vancomycin HCl


  (Vancomycin


 Trough Level)  1 each  1X  ONCE  19 15:30


 19 11:36 DC  





 


 Vancomycin HCl


 1.5 gm/Sodium


 Chloride  500 ml @ 


 250 mls/hr  Q8H  19 12:00


 19 07:31 DC  





 


 Vancomycin HCl 2


 gm/Sodium Chloride  500 ml @ 


 250 mls/hr  Q12H  19 16:00


 19 11:36 DC 19 04:19














Labs:


Lab





Laboratory Tests








Test


 20


05:35


 


Sodium Level


 141 mmol/L


(136-145)


 


Potassium Level


 4.0 mmol/L


(3.5-5.1)


 


Chloride Level


 106 mmol/L


()


 


Carbon Dioxide Level


 26 mmol/L


(21-32)


 


Anion Gap 9 (6-14) 


 


Blood Urea Nitrogen


 15 mg/dL


(8-26)


 


Creatinine


 1.1 mg/dL


(0.7-1.3)


 


Estimated GFR


(Cockcroft-Gault) 68.3 





 


Glucose Level


 104 mg/dL


(70-99)


 


Calcium Level


 8.8 mg/dL


(8.5-10.1)








Micro





 

--------------------------------------------------------------------------------


------------





RUN DATE: 19                  [facility] *LIVE*               

  PAGE 1   


RUN TIME: 1610                            Specimen Inquiry                    


 

--------------------------------------------------------------------------------


------------





PATIENT: NOEMÍANIVAL KNIGHT                     ACCT: [id number] LOC:  [address] [place] [address]      

U: [id number]


                                       AGE/SX: 59/M         ROOM: [facility]        

RE19


REG DR:  RANDALL PAULSON III DO          :    1960   BED:  1          

DIS:         


                                       STATUS: ADM IN       TLOC:           


-----------------------------------

---------------------------------------------------------








SPEC #: [id number]       TENZIN:      STATUS:  COMP           REQ 

#: [id number]


                            RECD:      [facility] DR: ALEXANDER VAIL MD          


SOURCE: [facility]              ENTR:      OT DR: RANDALL PAULSON III, DO          


[facility]: DONIS MEHTA STEPHEN J MD


ORDERED:  TIM/ALVINA/JEREMY                                                        

 


COMMENTS: SACRAL BONE TISSUE                                          


 

--------------------------------------------------------------------------------


------------





  Procedure                         Result                                      

         


-------------------------------------------------

-------------------------------------------





  ANAEROBIC-AEROBIC CULTURE  Final  


        Final report





  ANAEROBIC RES 1  Final  


        Comment





        No anaerobic growth in 72 hours.





  AEROBIC CULT  Final  


        Preliminary report


        Final report





  AEROBIC RES 1  Final  


        Comment





        No growth in 36 - 48 hours.


        No growth in 56 - 72 hours.





  GRAM STAIN  Final  


        Final report





  GRAM STAIN RES 1  Final  


        Comment





        No white blood cells seen.





  GRAM STAIN RES 2  Final  


        No organisms seen





        Performed at:   - [facility], [place], [place]  [id number]


        : YAW Vidales MD, Phone:  [phone]





------------------------------------------------------

--------------------------------------











 

--------------------------------------------------------------------------------


------------





RUN DATE: 19                  [facility] *LIVE*               

  PAGE 1   


RUN TIME:                             Specimen Inquiry                    


----------------------------------------------------------------------------

----------------





PATIENT: ANIVAL DOWD EUGENE                     ACCT: [id number] LOC:  [address] [place] [address]      

U: [id number]


                                       AGE/SX: 59/M         ROOM: 562        

RE19


REG DR:  RANDALL PAULSON III, DO          :    1960   BED:  1          

DIS:         


                                       STATUS: ADM IN       TLOC:           


--------------

------------------------------------------------------------------------------








SPEC #: [id number]       TENZIN: 19-     STATUS:  COMP           REQ 

#: [id number]


                            RECD: 19-     SUBM DR: RANDALL PAULSON III, DO          


SOURCE: [facility]              ENTR:      MELVIN DR: DONIS GARCÍA DO          


[facility]: WOUND                                                                   

          


ORDERED:  ANAER/AEROB/GS                                                        

 


COMMENTS: SACRAL WOUND                                                


-------------------------------------

-------------------------------------------------------





  Procedure                         Result                                      

         


 

--------------------------------------------------------------------------------


------------





  ANAEROBIC-AEROBIC CULTURE  Final  


        Final report





  ANAEROBIC RES 1  Final  


        Comment





        No anaerobic growth in 72 hours.





  AEROBIC CULT  Final  


        Preliminary report


        Final report





  AEROBIC RES 1  Final  


        Mixed site clauida.





        2+





  GRAM STAIN  Final  


        Final report





  GRAM STAIN RES 1  Final  


        Comment





        No white blood cells seen.





  GRAM STAIN RES 2  Final  


        No organisms seen





        Performed at:   - [facility] [address], [place], [place]  [id number]


        : YAW Vidales MD, Phone:  [phone]





------------------------------------

--------------------------------------------------------

















                                   ** END OF REPORT **





Objective:


Assessment:


 


1.   Acute and chronic OM changes on CT abdomen and pelvis ,Pt has been treated 

with previous OM at [facility] in the past . Chronic OM could be from the same


ESR 35


S/P IR biopsy  


Cult negative 


2.  Crohn's disease with history of ostomy and multiple fistula repairs.


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 Wound Cults

negative from 2019


4. Gastroesophageal reflux disease.


4.  Obesity.


5.  Kidney stone on CT Abdomen





Plan:


Plan of Care


Cont trial with empiric zosyn/ daptomycin


Ideally would need  I and D of acute om, antibiotics alone will not be optimal


Recommended transfer to [facility] for I and D, ostectomy and possible flap 

if eligible


Per staff [facility] did not accept pt for transfer


PICC line  


social service to assist with discharge antibiotics


script in chart


weekly labs CBCD/BUN/CREAT/CPK/ESR/LFT


d/w pt about rationale with antibiotic treatment, adverse events and risk of 

failure


Probiotics 


 Off load


WOUND CARE per wound team





D/W ALEXANDER SANCHEZ MD            2020 12:18

## 2020-01-01 NOTE — PDOC
PROGRESS NOTES


Chief Complaint


Chief Complaint


IMPRESSION











Non-surgical perianal wound - with bleeding


Severe Crohn's with multiple surgeries s/p ostomy (multiple fissures and 

abdominal wounds)


Multiple deep ulcers along the distal sacrum and buttocks extend into the right 

ischiorectal fat and to the former site of the coccyx. The  coccyx and distal 

sacrum have been completely eroded. There is some debris within the tracts, but 

no drainable collection.


2. Large parastomal hernia on the right containing multiple nonobstructed 


small bowel loops


Chronic pain


Hyperlipidemia


Hypertension


GERD


Incidental 5 mm rt kidney stone


Hiatal hernia with small bowel loops





Cont trial with empiric zosyn/ daptomycin


CT-guided biopsy of the sacrum








PLAN transfer to [facility]/// Plastics for I and D, ostectomy and possible flap if 

eligible [facility] did not accept pt for transfer


PICC line  


social service to assist with discharge antibiotics








37 MIN PT EXAM, CHART REVIEW, > 50% OF TIME SPENT WITH EXAM, CHART REVIEW, PT 

CARE COORDINATION





History of Present Illness


History of Present Illness


01/01/20


Pt seen and examined


pleasant


Pt ambulated w/out difficulty


DW pt about their care


DW RN


Reviewed pt's chart 





 12/31/19


Patient seen and examined


Chart reviewed


Discussed with RN





Vitals


Vitals





Vital Signs








  Date Time  Temp Pulse Resp B/P (MAP) Pulse Ox O2 Delivery O2 Flow Rate FiO2


 


1/1/20 09:53   16   Room Air  


 


1/1/20 07:00 98.3 67  101/55 (70) 96   





 98.3       


 


12/31/19 08:00       2.0 











Physical Exam


Physical Exam


GENERAL:  Propped up in bed, alert, in no apparent distress.


HEENT:  Pupils equally round.  Oropharynx pink and moist.


NECK:  Supple, no lymphadenopathy.


LUNGS:  Clear to auscultation.


HEART:  S1, S2.


ABDOMEN:  Obese, soft, nontender, bowel sounds present.  Multiple scars and 

right-sided ostomy.


EXTREMITIES:  No gross edema or cyanosis.


SKIN:  Warm to touch without signs of rash. Several sacrococcygeal fistulas . 


NEUROLOGIC:  Alert and answering questions appropriately.  


PIV


General:  Alert, Oriented X3, Cooperative, No acute distress


Heart:  Regular rate, Normal S1, Normal S2


Lungs:  Clear


Abdomen:  Normal bowel sounds, Soft, Other (Multiple scars and right-sided 

ostomy.)


Extremities:  No clubbing, No cyanosis


Skin:  No rashes, Other ( Several sacrococcygeal fistula, 3 packed. + drainage, 

no surrounding redness  )





Labs


LABS


SEX: M                    EXAM DT: 12/24/19         ACCESSION#: [id number]


STATUS: ADM IN            ORD. PHYSICIAN: HENRRY VINCENT MD


REASON: Multiple tunnels or fistulas of sacral area


PROCEDURE: CT ABD PELV W/ IV CONTRST ONLY





EXAM: CT ABDOMEN/PELVIS WITH CONTRAST.


 


HISTORY: Sacral wound second fistulous.


 


TECHNIQUE: Computed tomography of the abdomen and pelvis was performed 


after the intravenous administration of iodinated contrast.


 


COMPARISON: 08/09/2018, 09/11/2018.


 


FINDINGS: Lung windows through the visualized portions of the bases reveal


mild atelectasis. There is a calcified granuloma in the left lower lobe. 


 


Soft tissue defects along the distal sacrum and midline buttocks are 


consistent with ulcers. Granulation tissue lined tracts extend deep into 


the right ischiorectal fat, and to the former location of the coccyx. The 


coccyx and most distal aspect of the sacrum have been completely eroded 


consistent with acute osteomyelitis. There is chronic osteomyelitis within


the remaining distal sacrum. The cavity at the former site of coccyx 


measures 3.7 x 3.0 cm. Additional granulation tissue and stranding extends


into the presacral space. There is no drainable collection at this site.


 


The liver, gallbladder, pancreatic and adrenal glands are unremarkable. 


Foci of mild hypoattenuation within the spleen measure up to 13 mm. These 


are likely benign lesions such as hemangiomas. Right renal calculi measure


up to 5 mm. The left kidney is unremarkable. There are no pathologically 


enlarged lymph nodes.


 


The colon appears to be surgically absent. There is a right lower quadrant


ileostomy. A large right lower quadrant parastomal hernia contains 


multiple loops of nonobstructed small bowel. Another moderate to large 


left sided hernia is likely at the site of an old ostomy and contains only


fat. There is no small bowel obstruction.


 


The prostate is enlarged measuring 4.3 x 3.8 cm. The bladder is partially 


decompressed but demonstrates diffuse wall thickening.


 


IMPRESSION: 


1. Multiple deep ulcers along the distal sacrum and buttocks extend into 


the right ischiorectal fat and to the former site of the coccyx. The 


coccyx and distal sacrum have been completely eroded. There is some debris


within the tracts, but no drainable collection.


2. Large parastomal hernia on the right containing multiple nonobstructed 


small bowel loops


3. A moderate hernia at a prior stoma site on the left contains only fat.


4. Right renal calculi measure up to 5 mm.


5. Diffuse bladder wall thickening indicates chronic outlet obstruction or


inflammation.


 


 


*One or more of the following individualized dose reduction techniques 


were utilized for this examination:  


1. Automated exposure control.  


2. Adjustment of the mA and/or kV according to patient size.  


3. Use of iterative reconstruction technique.


 


 


Electronically signed by: KT Hopson MD (12/24/2019 10:30 AM) 


[facility]














DICTATED and SIGNED BY:     JF HOPSON MD


DATE:     12/24/19 1030





Procedure: CT-guided biopsy of the sacrum





Clinical Indication: Adult male with severe decubitus ulcers, sacral ostial


lysis, suspected osteomyelitis.





Sedation: Conscious sedation was administered with a total intraprocedural


face-to-face time of 12 minutes.  The patient was monitored by a qualified


independent observer throughout the time of sedation.  Please refer to the


medical record for exact doses of medications utilized to achieve moderate


sedation. 





Antibiotics: None





Sterility: The procedure was performed in its entirety using appropriate


elements of sterile technique. 





Consent: 


The procedure was explained in its entirety to the patient or the patients


designated representative by a member of the treatment team, including a


discussion of the risks, benefits and commonly accepted alternatives to the


procedure, as well as the expected consequences of no therapy whatsoever.


Discussion of the risks included, but was not limited to, those that are most


frequent and those that are rare but possibly severe or life-threatening, as


well as the possibility of unforeseen complications.





Technique and Findings: Following informed consent, the patient was prepped


and draped in usual sterile fashion. Preliminary CT scan of the area of


interest was performed. 1% lidocaine was used to achieve local anesthesia. A


small dermatotomy was made. Under periodic CT surveillance, a 10-gauge


automated bone biopsy needle was advanced into the distal sacrum. The needle


was removed and the specimen was preserved in formalin. A second pass through


the sacrum was then performed, with a second specimen preserved in a sterile


container for microbiologic analysis. Hemostasis was readily achieved with


manual compression.





Complications: No immediate





Impression:


1. CT-guided biopsy of the sacrum as described. 











SPDESC: BIOPSY                                               DONIS GARCÍA STEPHEN J MD


ORDERED:  TIM/ALVINA/JEREMY                                                        

 


COMMENTS: SACRAL BONE TISSUE                                          


-----------------------------------------------------------------------

---------------------





  Procedure                         Result                                      

         


--------------

------------------------------------------------------------------------------





  ANAEROBIC-AEROBIC CULTURE  Final  


        Final report





  ANAEROBIC RES 1  Final  


        Comment





        No anaerobic growth in 72 hours.





  AEROBIC CULT  Final  


        Preliminary report


        Final report





  AEROBIC RES 1  Final  


        Comment





        No growth in 36 - 48 hours.


        No growth in 56 - 72 hours.





  GRAM STAIN  Final  


        Final report





  GRAM STAIN RES 1  Final  


        Comment





        No white blood cells seen.





  GRAM STAIN RES 2  Final  


        No organisms seen





        Performed at:   - [facility], [place], [place]  [id number]


        : YAW Vidales MD, Phone:  [phone]





-------------------

-------------------------------------------------------------------------











Laboratory Tests








Test


 1/1/20


05:35


 


Sodium Level


 141 mmol/L


(136-145)


 


Potassium Level


 4.0 mmol/L


(3.5-5.1)


 


Chloride Level


 106 mmol/L


()


 


Carbon Dioxide Level


 26 mmol/L


(21-32)


 


Anion Gap 9 (6-14) 


 


Blood Urea Nitrogen


 15 mg/dL


(8-26)


 


Creatinine


 1.1 mg/dL


(0.7-1.3)


 


Estimated GFR


(Cockcroft-Gault) 68.3 





 


Glucose Level


 104 mg/dL


(70-99)


 


Calcium Level


 8.8 mg/dL


(8.5-10.1)











Comment


Review of Relevant


I have reviewed the following items crow (where applicable) has been applied.


Labs





Laboratory Tests








Test


 12/31/19


07:19 1/1/20


05:35


 


Sodium Level


 143 mmol/L


(136-145) 141 mmol/L


(136-145)


 


Potassium Level


 4.1 mmol/L


(3.5-5.1) 4.0 mmol/L


(3.5-5.1)


 


Chloride Level


 108 mmol/L


() 106 mmol/L


()


 


Carbon Dioxide Level


 25 mmol/L


(21-32) 26 mmol/L


(21-32)


 


Anion Gap 10 (6-14)  9 (6-14) 


 


Blood Urea Nitrogen


 8 mg/dL (8-26) 


 15 mg/dL


(8-26)


 


Creatinine


 1.2 mg/dL


(0.7-1.3) 1.1 mg/dL


(0.7-1.3)


 


Estimated GFR


(Cockcroft-Gault) 61.8 


 68.3 





 


Glucose Level


 96 mg/dL


(70-99) 104 mg/dL


(70-99)


 


Calcium Level


 8.8 mg/dL


(8.5-10.1) 8.8 mg/dL


(8.5-10.1)








Laboratory Tests








Test


 1/1/20


05:35


 


Sodium Level


 141 mmol/L


(136-145)


 


Potassium Level


 4.0 mmol/L


(3.5-5.1)


 


Chloride Level


 106 mmol/L


()


 


Carbon Dioxide Level


 26 mmol/L


(21-32)


 


Anion Gap 9 (6-14) 


 


Blood Urea Nitrogen


 15 mg/dL


(8-26)


 


Creatinine


 1.1 mg/dL


(0.7-1.3)


 


Estimated GFR


(Cockcroft-Gault) 68.3 





 


Glucose Level


 104 mg/dL


(70-99)


 


Calcium Level


 8.8 mg/dL


(8.5-10.1)








Microbiology


12/27/19 Anaerobic/Aerobic Culture - Final, Complete


           


12/27/19 Anaerobic Culture Result 1 (LION) - Final, Complete


           


12/27/19 Aerobic Culture - Final, Complete


           


12/27/19 Aerobic Culture Result 1 (LION) - Final, Complete


           


12/27/19 Gram Stain - Final, Complete


           


12/27/19 Gram Stain Result 1 (LION) - Final, Complete


           


12/27/19 Gram Stain Result 2 (LION) - Final, Complete


Medications





Current Medications


Acetaminophen (Tylenol) 650 mg PRN Q4HRS  PRN PO PAIN;  Start 12/23/19 at 14:00;

 Stop 12/25/19 at 08:20;  Status DC


Ergocalciferol (Vitamin D2) 50,000 unit QMONTH PO ;  Start 1/22/20 at 09:00


Famotidine (Pepcid) 20 mg HS PO  Last administered on 12/31/19at 21:17;  Start 

12/23/19 at 21:00


Guaifenesin (Robitussin Dm) 5 ml PRN QID  PRN PO COUGH;  Start 12/23/19 at 14:00


Ascorbic Acid (Vitamin C) 500 mg BID PO  Last administered on 1/1/20at 08:52;  

Start 12/23/19 at 21:00


Clindamycin HCl (Cleocin) 300 mg TID PO  Last administered on 12/24/19at 08:39; 

Start 12/23/19 at 14:00;  Stop 12/24/19 at 09:30;  Status DC


Magnesium Hydroxide (Milk Of Magnesia) 2,400 mg PRN DAILY  PRN PO CONSTIPATION; 

Start 12/23/19 at 14:00


Mesalamine (Delzicol) 800 mg TID PO  Last administered on 1/1/20at 08:52;  Start

12/23/19 at 15:00


Non-Formulary Medication (Protein Supplement (Nutritional Drink Mix)) 420 gm 

DAILY PO ;  Start 12/24/19 at 09:00;  Status UNV


Iohexol (Omnipaque 300 Mg/ml) 75 ml 1X  ONCE IV  Last administered on 12/24/19at

08:29;  Start 12/24/19 at 08:00;  Stop 12/24/19 at 08:01;  Status DC


Acetaminophen (Tylenol) 500 mg PRN Q6HRS  PRN PO MILD PAIN / TEMP;  Start 

12/25/19 at 08:30


Acetaminophen/ Codeine Phosphate (Tylenol #3) 1 tab PRN Q6HRS  PRN PO MODERATE 

PAIN Last administered on 12/27/19at 20:57;  Start 12/25/19 at 08:30


Oxycodone/ Acetaminophen (Percocet 5/325) 1 tab PRN Q4HRS  PRN PO SEVERE PAIN 

Last administered on 1/1/20at 08:53;  Start 12/25/19 at 08:30


Morphine Sulfate (Morphine Sulfate) 2 mg PRN Q2HR  PRN IV PAIN Last administered

on 12/30/19at 16:07;  Start 12/25/19 at 08:30


Ondansetron HCl (Zofran) 4 mg PRN Q6HRS  PRN IVP NAUSEA/VOMITING;  Start 

12/27/19 at 09:30


Diphenhydramine HCl (Benadryl) 25 mg PRN QHS  PRN PO INSOMNIA;  Start 12/27/19 

at 09:30


Lidocaine HCl (Buffered Lidocaine 1%) 3 ml STK-MED ONCE .ROUTE ;  Start 12/27/19

at 10:01;  Stop 12/27/19 at 10:02;  Status DC


Midazolam HCl (Versed) 2 mg STK-MED ONCE .ROUTE ;  Start 12/27/19 at 10:20;  

Stop 12/27/19 at 10:21;  Status DC


Fentanyl Citrate (Fentanyl 2ml Vial) 100 mcg STK-MED ONCE .ROUTE ;  Start 

12/27/19 at 10:20;  Stop 12/27/19 at 10:21;  Status DC


Lidocaine HCl (Buffered Lidocaine 1%) 3 ml 1X  ONCE IJ  Last administered on 

12/27/19at 10:58;  Start 12/27/19 at 11:00;  Stop 12/27/19 at 11:01;  Status DC


Midazolam HCl (Versed) 2 mg 1X  ONCE IV  Last administered on 12/27/19at 10:57; 

Start 12/27/19 at 11:00;  Stop 12/27/19 at 11:01;  Status DC


Fentanyl Citrate (Fentanyl 2ml Vial) 100 mcg 1X  ONCE IV  Last administered on 

12/27/19at 10:57;  Start 12/27/19 at 11:00;  Stop 12/27/19 at 11:01;  Status DC


Vancomycin HCl (Vanco Per Pharmacy) 1 each PRN DAILY  PRN MC SEE COMMENTS Last a

dministered on 12/29/19at 07:39;  Start 12/27/19 at 13:00;  Stop 12/30/19 at 

11:36;  Status DC


Piperacillin Sod/ Tazobactam Sod (Zosyn Per Pharmacy) 1 each PRN DAILY  PRN MC 

SEE COMMENTS;  Start 12/27/19 at 13:00


Vancomycin HCl 2 gm/Sodium Chloride 500 ml @  250 mls/hr 1X  ONCE IV  Last 

administered on 12/27/19at 15:01;  Start 12/27/19 at 14:00;  Stop 12/27/19 at 

15:59;  Status DC


Piperacillin Sod/ Tazobactam Sod 3.375 gm/Sodium Chloride 50 ml @  100 mls/hr 

Q6HRS IV  Last administered on 1/1/20at 05:43;  Start 12/27/19 at 13:15


Vancomycin HCl 1.5 gm/Sodium Chloride 500 ml @  250 mls/hr Q12H IV  Last 

administered on 12/29/19at 04:08;  Start 12/28/19 at 03:00;  Stop 12/29/19 at 

06:00;  Status DC


Vancomycin HCl (Vancomycin Trough Level) 1 each 1X  ONCE MC  Last administered 

on 12/29/19at 03:00;  Start 12/29/19 at 02:30;  Stop 12/29/19 at 02:31;  Status 

DC


Lactobacillus Rhamnosus (Culturelle) 1 cap BID PO  Last administered on 1/1/20at

08:52;  Start 12/28/19 at 21:00


Vancomycin HCl 1.5 gm/Sodium Chloride 500 ml @  250 mls/hr Q8H IV ;  Start 

12/29/19 at 12:00;  Stop 12/29/19 at 07:31;  Status DC


Vancomycin HCl (Vancomycin Trough Level) 1 each 1X  ONCE MC ;  Start 12/30/19 at

11:30;  Stop 12/29/19 at 07:31;  Status DC


Vancomycin HCl 2 gm/Sodium Chloride 500 ml @  250 mls/hr Q12H IV  Last 

administered on 12/30/19at 04:19;  Start 12/29/19 at 16:00;  Stop 12/30/19 at 

11:36;  Status DC


Vancomycin HCl (Vancomycin Trough Level) 1 each 1X  ONCE MC ;  Start 12/30/19 at

15:30;  Stop 12/30/19 at 11:36;  Status DC


Daptomycin 530 mg/ Sodium Chloride 50 ml @  100 mls/hr Q24H IV  Last 

administered on 12/31/19at 12:13;  Start 12/30/19 at 12:30


Lidocaine HCl (Buffered Lidocaine 1%) 3 ml STK-MED ONCE .ROUTE ;  Start 12/31/19

at 14:11;  Stop 12/31/19 at 14:11;  Status DC


Lidocaine HCl (Buffered Lidocaine 1%) 6 ml 1X  ONCE INJ ;  Start 12/31/19 at 

14:30;  Stop 12/31/19 at 14:31;  Status DC





Active Scripts


Active


Reported


Hydrocodone-Apap 5-325  ** (Hydrocodone Bit/Acetaminophen) 1 Tab Tablet 1-2 Tab 

PO PRN Q4HRS PRN


Tussin Dm Cough & Chest Syrup (Guaifenesin/Dextromethorphan) 118 Ml Syrup 5 Ml 

PO PRN QID PRN 12 Days


Milk Of Magnesia (Magnesium Hydroxide) 2,400 Mg/10 Ml Oral.susp 2,400 Mg PO PRN 

DAILY PRN


Tylenol (Acetaminophen) 325 Mg Tablet 650 Mg PO PRN Q4HRS PRN


Clindamycin Hcl 300 Mg Capsule 300 Mg PO TID


Vitamin D2 (Ergocalciferol (Vitamin D2)) 50,000 Unit Capsule 1 Cap PO QMONTH


Vitamin C (Ascorbate Calcium) 500 Mg Tablet 500 Mg PO BID


Nutritional Drink Mix (Protein Supplement) 420 Gm Powder 420 Gm PO DAILY


Multi-Vitamin Daily (Multivitamin) 1 Each Tablet 1 Each PO 


Famotidine 20 Mg Tablet 20 Mg PO HS


Asacol Hd (Mesalamine) 800 Mg Tablet.dr 1,600 Mg PO TID


Vitals/I & O





Vital Sign - Last 24 Hours








 12/31/19 12/31/19 12/31/19 12/31/19





 11:00 15:00 19:00 19:30


 


Temp  97.4 98.0 





  97.4 98.0 


 


Pulse 64 78 67 


 


Resp 18 18 20 


 


B/P (MAP) 110/55 (73) 116/67 (83) 118/62 (80) 


 


Pulse Ox 98 98 97 


 


O2 Delivery Room Air Room Air  Room Air


 


    





    





 12/31/19 1/1/20 1/1/20 1/1/20





 23:00 03:00 07:00 08:53


 


Temp 98.0 97.9 98.3 





 98.0 97.9 98.3 


 


Pulse 67 60 67 


 


Resp 16 16 16 16


 


B/P (MAP) 103/55 (71) 95/57 (70) 101/55 (70) 


 


Pulse Ox 95 96 96 


 


O2 Delivery   Room Air Room Air


 


    





    





 1/1/20   





 09:53   


 


Resp 16   


 


O2 Delivery Room Air   














Intake and Output   


 


 12/31/19 12/31/19 1/1/20





 15:00 23:00 07:00


 


Intake Total  240 ml 120 ml


 


Balance  240 ml 120 ml

















GUNJAN LOPEZ MD           Jan 1, 2020 10:17

## 2020-01-02 VITALS — DIASTOLIC BLOOD PRESSURE: 73 MMHG | SYSTOLIC BLOOD PRESSURE: 113 MMHG

## 2020-01-02 VITALS — SYSTOLIC BLOOD PRESSURE: 108 MMHG | DIASTOLIC BLOOD PRESSURE: 57 MMHG

## 2020-01-02 VITALS — DIASTOLIC BLOOD PRESSURE: 66 MMHG | SYSTOLIC BLOOD PRESSURE: 107 MMHG

## 2020-01-02 VITALS — DIASTOLIC BLOOD PRESSURE: 64 MMHG | SYSTOLIC BLOOD PRESSURE: 117 MMHG

## 2020-01-02 VITALS — SYSTOLIC BLOOD PRESSURE: 114 MMHG | DIASTOLIC BLOOD PRESSURE: 61 MMHG

## 2020-01-02 VITALS — DIASTOLIC BLOOD PRESSURE: 62 MMHG | SYSTOLIC BLOOD PRESSURE: 114 MMHG

## 2020-01-02 LAB
ALBUMIN SERPL-MCNC: 2.9 G/DL (ref 3.4–5)
ALBUMIN/GLOB SERPL: 0.6 {RATIO} (ref 1–1.7)
ALP SERPL-CCNC: 85 U/L (ref 46–116)
ALT SERPL-CCNC: 26 U/L (ref 16–63)
ANION GAP SERPL CALC-SCNC: 8 MMOL/L (ref 6–14)
AST SERPL-CCNC: 17 U/L (ref 15–37)
BASOPHILS # BLD AUTO: 0.1 X10^3/UL (ref 0–0.2)
BASOPHILS NFR BLD: 1 % (ref 0–3)
BILIRUB SERPL-MCNC: 0.3 MG/DL (ref 0.2–1)
BUN SERPL-MCNC: 13 MG/DL (ref 8–26)
BUN/CREAT SERPL: 12 (ref 6–20)
CALCIUM SERPL-MCNC: 8.6 MG/DL (ref 8.5–10.1)
CHLORIDE SERPL-SCNC: 105 MMOL/L (ref 98–107)
CO2 SERPL-SCNC: 27 MMOL/L (ref 21–32)
CREAT SERPL-MCNC: 1.1 MG/DL (ref 0.7–1.3)
EOSINOPHIL NFR BLD: 0.2 X10^3/UL (ref 0–0.7)
EOSINOPHIL NFR BLD: 3 % (ref 0–3)
ERYTHROCYTE [DISTWIDTH] IN BLOOD BY AUTOMATED COUNT: 13.9 % (ref 11.5–14.5)
GFR SERPLBLD BASED ON 1.73 SQ M-ARVRAT: 68.3 ML/MIN
GLOBULIN SER-MCNC: 4.6 G/DL (ref 2.2–3.8)
GLUCOSE SERPL-MCNC: 98 MG/DL (ref 70–99)
HCT VFR BLD CALC: 39.3 % (ref 39–53)
HGB BLD-MCNC: 13.1 G/DL (ref 13–17.5)
LYMPHOCYTES # BLD: 1 X10^3/UL (ref 1–4.8)
LYMPHOCYTES NFR BLD AUTO: 16 % (ref 24–48)
MCH RBC QN AUTO: 30 PG (ref 25–35)
MCHC RBC AUTO-ENTMCNC: 33 G/DL (ref 31–37)
MCV RBC AUTO: 90 FL (ref 79–100)
MONO #: 0.6 X10^3/UL (ref 0–1.1)
MONOCYTES NFR BLD: 10 % (ref 0–9)
NEUT #: 4.8 X10^3/UL (ref 1.8–7.7)
NEUTROPHILS NFR BLD AUTO: 71 % (ref 31–73)
PLATELET # BLD AUTO: 153 X10^3/UL (ref 140–400)
POTASSIUM SERPL-SCNC: 4.1 MMOL/L (ref 3.5–5.1)
PROT SERPL-MCNC: 7.5 G/DL (ref 6.4–8.2)
RBC # BLD AUTO: 4.35 X10^6/UL (ref 4.3–5.7)
SODIUM SERPL-SCNC: 140 MMOL/L (ref 136–145)
WBC # BLD AUTO: 6.7 X10^3/UL (ref 4–11)

## 2020-01-02 PROCEDURE — B5181ZA FLUOROSCOPY OF SUPERIOR VENA CAVA USING LOW OSMOLAR CONTRAST, GUIDANCE: ICD-10-PCS | Performed by: RADIOLOGY

## 2020-01-02 PROCEDURE — 02HV33Z INSERTION OF INFUSION DEVICE INTO SUPERIOR VENA CAVA, PERCUTANEOUS APPROACH: ICD-10-PCS | Performed by: RADIOLOGY

## 2020-01-02 PROCEDURE — B548ZZA ULTRASONOGRAPHY OF SUPERIOR VENA CAVA, GUIDANCE: ICD-10-PCS | Performed by: RADIOLOGY

## 2020-01-02 RX ADMIN — OXYCODONE HYDROCHLORIDE AND ACETAMINOPHEN PRN TAB: 5; 325 TABLET ORAL at 14:46

## 2020-01-02 RX ADMIN — OXYCODONE HYDROCHLORIDE AND ACETAMINOPHEN SCH MG: 500 TABLET ORAL at 09:34

## 2020-01-02 RX ADMIN — Medication SCH CAP: at 20:42

## 2020-01-02 RX ADMIN — FAMOTIDINE SCH MG: 20 TABLET ORAL at 20:42

## 2020-01-02 RX ADMIN — DAPTOMYCIN SCH MLS/HR: 500 INJECTION, POWDER, LYOPHILIZED, FOR SOLUTION INTRAVENOUS at 11:41

## 2020-01-02 RX ADMIN — MESALAMINE SCH MG: 400 CAPSULE, DELAYED RELEASE ORAL at 09:35

## 2020-01-02 RX ADMIN — MESALAMINE SCH MG: 400 CAPSULE, DELAYED RELEASE ORAL at 20:42

## 2020-01-02 RX ADMIN — PIPERACILLIN SODIUM AND TAZOBACTAM SODIUM SCH MLS/HR: 3; .375 INJECTION, POWDER, LYOPHILIZED, FOR SOLUTION INTRAVENOUS at 18:32

## 2020-01-02 RX ADMIN — MESALAMINE SCH MG: 400 CAPSULE, DELAYED RELEASE ORAL at 13:28

## 2020-01-02 RX ADMIN — OXYCODONE HYDROCHLORIDE AND ACETAMINOPHEN PRN TAB: 5; 325 TABLET ORAL at 22:39

## 2020-01-02 RX ADMIN — PIPERACILLIN SODIUM AND TAZOBACTAM SODIUM SCH MLS/HR: 3; .375 INJECTION, POWDER, LYOPHILIZED, FOR SOLUTION INTRAVENOUS at 13:26

## 2020-01-02 RX ADMIN — PIPERACILLIN SODIUM AND TAZOBACTAM SODIUM SCH MLS/HR: 3; .375 INJECTION, POWDER, LYOPHILIZED, FOR SOLUTION INTRAVENOUS at 05:45

## 2020-01-02 RX ADMIN — Medication SCH CAP: at 09:34

## 2020-01-02 RX ADMIN — OXYCODONE HYDROCHLORIDE AND ACETAMINOPHEN SCH MG: 500 TABLET ORAL at 20:42

## 2020-01-02 NOTE — PDOC
Infectious Disease Note


Subjective:


Subjective


Pt without complaints


feels stronger


No F/C/N/V/or increase ostomy output 


says feels stronger


has been having dry skin on legs





Vital Signs:


Vital Signs





Vital Signs








  Date Time  Temp Pulse Resp B/P (MAP) Pulse Ox O2 Delivery O2 Flow Rate FiO2


 


20 07:00 97.8 77 17 114/61 (78) 96 Room Air  





 97.8       











Physical Exam:


PHYSICAL EXAM


GENERAL:  Propped up in bed, alert, in no apparent distress.


HEENT:  Pupils equally round.  Oropharynx pink and moist.


NECK:  Supple, no lymphadenopathy.


LUNGS:  Clear to auscultation.


HEART:  S1, S2.


ABDOMEN:  Obese, soft, nontender, bowel sounds present.  Multiple scars and 

right-sided ostomy.


EXTREMITIES:  No gross edema or cyanosis.


SKIN:  Warm to touch without signs of rash. Several sacrococcygeal fistulas . 


NEUROLOGIC:  Alert and answering questions appropriately.  


PIV





Medications:


Inpatient Meds:





Current Medications








 Medications


  (Trade)  Dose


 Ordered  Sig/Lisa  Start Time


 Stop Time Status Last Admin


Dose Admin


 


 Acetaminophen


  (Tylenol)  500 mg  PRN Q6HRS  PRN  19 08:30


     





 


 Acetaminophen/


 Codeine Phosphate


  (Tylenol #3)  1 tab  PRN Q6HRS  PRN  19 08:30


    19 20:57


1 TAB


 


 Ascorbic Acid


  (Vitamin C)  500 mg  BID  19 21:00


    20 09:34


500 MG


 


 Clindamycin HCl


  (Cleocin)  300 mg  TID  19 14:00


 19 09:30 DC 19 08:39


300 MG


 


 Daptomycin 530 mg/


 Sodium Chloride  50 ml @ 


 100 mls/hr  Q24H  19 12:30


    20 12:51


100 MLS/HR


 


 Diphenhydramine


 HCl


  (Benadryl)  25 mg  PRN QHS  PRN  19 09:30


     





 


 Ergocalciferol


  (Vitamin D2)  50,000 unit  QMONTH  20 09:00


     





 


 Famotidine


  (Pepcid)  20 mg  HS  19 21:00


    20 20:43


20 MG


 


 Fentanyl Citrate


  (Fentanyl 2ml


 Vial)  100 mcg  1X  ONCE  19 11:00


 19 11:01 DC 19 10:57


50 MCG


 


 Guaifenesin


  (Robitussin Dm)  5 ml  PRN QID  PRN  19 14:00


     





 


 Iohexol


  (Omnipaque 300


 Mg/ml)  75 ml  1X  ONCE  19 08:00


 19 08:01 DC 19 08:29


75 ML


 


 Lactobacillus


 Rhamnosus


  (Culturelle)  1 cap  BID  19 21:00


    20 09:34


1 CAP


 


 Lidocaine HCl


  (Buffered


 Lidocaine 1%)  6 ml  1X  ONCE  19 14:30


 19 14:31 DC  





 


 Magnesium


 Hydroxide


  (Milk Of


 Magnesia)  2,400 mg  PRN DAILY  PRN  19 14:00


     





 


 Mesalamine


  (Delzicol)  800 mg  TID  19 15:00


    20 09:35


800 MG


 


 Midazolam HCl


  (Versed)  2 mg  1X  ONCE  19 11:00


 19 11:01 DC 19 10:57


1 MG


 


 Morphine Sulfate


  (Morphine


 Sulfate)  2 mg  PRN Q2HR  PRN  19 08:30


    19 16:07


2 MG


 


 Non-Formulary


 Medication


  (Protein


 Supplement


  (Nutritional


 Drink Mix))  420 gm  DAILY  19 09:00


   UNV  





 


 Ondansetron HCl


  (Zofran)  4 mg  PRN Q6HRS  PRN  19 09:30


     





 


 Oxycodone/


 Acetaminophen


  (Percocet 5/325)  1 tab  PRN Q4HRS  PRN  19 08:30


    20 16:44


1 TAB


 


 Piperacillin Sod/


 Tazobactam Sod


  (Zosyn Per


 Pharmacy)  1 each  PRN DAILY  PRN  19 13:00


     





 


 Piperacillin Sod/


 Tazobactam Sod


 3.375 gm/Sodium


 Chloride  50 ml @ 


 100 mls/hr  Q6HRS  19 13:15


    20 05:45


100 MLS/HR


 


 Vancomycin HCl


  (Vanco Per


 Pharmacy)  1 each  PRN DAILY  PRN  19 13:00


 19 11:36 DC 19 07:39


1 EACH


 


 Vancomycin HCl


  (Vancomycin


 Trough Level)  1 each  1X  ONCE  19 15:30


 19 11:36 DC  





 


 Vancomycin HCl


 1.5 gm/Sodium


 Chloride  500 ml @ 


 250 mls/hr  Q8H  19 12:00


 19 07:31 DC  





 


 Vancomycin HCl 2


 gm/Sodium Chloride  500 ml @ 


 250 mls/hr  Q12H  19 16:00


 19 11:36 DC 19 04:19


250 MLS/HR











Labs:


Lab





Laboratory Tests








Test


 20


05:55


 


White Blood Count


 6.7 x10^3/uL


(4.0-11.0)


 


Red Blood Count


 4.35 x10^6/uL


(4.30-5.70)


 


Hemoglobin


 13.1 g/dL


(13.0-17.5)


 


Hematocrit


 39.3 %


(39.0-53.0)


 


Mean Corpuscular Volume 90 fL () 


 


Mean Corpuscular Hemoglobin 30 pg (25-35) 


 


Mean Corpuscular Hemoglobin


Concent 33 g/dL


(31-37)


 


Red Cell Distribution Width


 13.9 %


(11.5-14.5)


 


Platelet Count


 153 x10^3/uL


(140-400)


 


Neutrophils (%) (Auto) 71 % (31-73) 


 


Lymphocytes (%) (Auto) 16 % (24-48) 


 


Monocytes (%) (Auto) 10 % (0-9) 


 


Eosinophils (%) (Auto) 3 % (0-3) 


 


Basophils (%) (Auto) 1 % (0-3) 


 


Neutrophils # (Auto)


 4.8 x10^3/uL


(1.8-7.7)


 


Lymphocytes # (Auto)


 1.0 x10^3/uL


(1.0-4.8)


 


Monocytes # (Auto)


 0.6 x10^3/uL


(0.0-1.1)


 


Eosinophils # (Auto)


 0.2 x10^3/uL


(0.0-0.7)


 


Basophils # (Auto)


 0.1 x10^3/uL


(0.0-0.2)


 


Sodium Level


 140 mmol/L


(136-145)


 


Potassium Level


 4.1 mmol/L


(3.5-5.1)


 


Chloride Level


 105 mmol/L


()


 


Carbon Dioxide Level


 27 mmol/L


(21-32)


 


Anion Gap 8 (6-14) 


 


Blood Urea Nitrogen


 13 mg/dL


(8-26)


 


Creatinine


 1.1 mg/dL


(0.7-1.3)


 


Estimated GFR


(Cockcroft-Gault) 68.3 





 


BUN/Creatinine Ratio 12 (6-20) 


 


Glucose Level


 98 mg/dL


(70-99)


 


Calcium Level


 8.6 mg/dL


(8.5-10.1)


 


Total Bilirubin


 0.3 mg/dL


(0.2-1.0)


 


Aspartate Amino Transf


(AST/SGOT) 17 U/L (15-37) 





 


Alanine Aminotransferase


(ALT/SGPT) 26 U/L (16-63) 





 


Alkaline Phosphatase


 85 U/L


()


 


Total Protein


 7.5 g/dL


(6.4-8.2)


 


Albumin


 2.9 g/dL


(3.4-5.0)


 


Albumin/Globulin Ratio 0.6 (1.0-1.7) 


 


Thyroid Stimulating Hormone


(TSH) 1.357 uIU/mL


(0.358-3.74)








Micro





-----

--------------------------------------------------------------------------------


-------





RUN DATE: 19                  [facility] *LIVE*               

  PAGE 1   


RUN TIME: 1610                            Specimen Inquiry                    


 

--------------------------------------------------------------------------------


------------





PATIENT: NOEMÍANIVAL KNIGHT                     ACCT: [id number] LOC:  [address] [place] [address]      

U: [id number]


                                       AGE/SX: 59/M         ROOM: [facility]        

RE19


REG DR:  RANDALL PAULSON III DO          :    1960   BED:  1          

DIS:         


                                       STATUS: ADM IN       TLOC:           


-------------------------------------------------------------

-------------------------------








SPEC #: [id number]       TENZIN:      STATUS:  COMP           REQ 

#: [id number]


                            RECD:      [facility] DR: ALEXANDER VAIL MD          


SOURCE: [facility]              ENTR:      [facility] DR: RANDALL PAULSON III, DO          


[facility]: DONIS MEHTA STEPHEN J MD


ORDERED:  TIM/AEROB/JEREMY                                                        

 


COMMENTS: SACRAL BONE TISSUE                                          


----

--------------------------------------------------------------------------------


--------





  Procedure                         Result                                      

         


---------------------------------------------------------------------------

-----------------





  ANAEROBIC-AEROBIC CULTURE  Final  


        Final report





  ANAEROBIC RES 1  Final  


        Comment





        No anaerobic growth in 72 hours.





  AEROBIC CULT  Final  


        Preliminary report


        Final report





  AEROBIC RES 1  Final  


        Comment





        No growth in 36 - 48 hours.


        No growth in 56 - 72 hours.





  GRAM STAIN  Final  


        Final report





  GRAM STAIN RES 1  Final  


        Comment





        No white blood cells seen.





  GRAM STAIN RES 2  Final  


        No organisms seen





        Performed at:   - [address], [place], [place]  [id number]


        : YAW Vidales MD, Phone:  [phone]





--------------------------------------------------------------------------------

------------











 

--------------------------------------------------------------------------------


------------





RUN DATE: 19                  [facility] *LIVE*               

  PAGE 1   


RUN TIME:                             Specimen Inquiry                    


 

--------------------------------------------------------------------------------


------------





PATIENT: ANIVAL DOWD EUGENE                     ACCT: [id number] LOC:  [address] [place] [address]      

U: [id number]


                                       AGE/SX: 59/M         ROOM: 562        

RE19


REG DR:  RANDALL PAULSON III, DO          :    1960   BED:  1          DIS

:         


                                       STATUS: ADM IN       TLOC:           


----------------------------------------

----------------------------------------------------








SPEC #: [id number]       TENZIN: 19-     STATUS:  COMP           REQ 

#: [id number]


                            RECD:      SU[facility] DR: RANDALL PAULSON III, DO          


SOURCE: [facility]              ENTR:      [facility] DR: DONIS GARCÍA DO          


[facility]: WOUND                                                                   

          


ORDERED:  ANAER/AEROB/GS                                                        

 


COMMENTS: SACRAL WOUND                                                


---------------------------------------------------------------

-----------------------------





  Procedure                         Result                                      

         


------

--------------------------------------------------------------------------------


------





  ANAEROBIC-AEROBIC CULTURE  Final  


        Final report





  ANAEROBIC RES 1  Final  


        Comment





        No anaerobic growth in 72 hours.





  AEROBIC CULT  Final  


        Preliminary report


        Final report





  AEROBIC RES 1  Final  


        Mixed site claudia.





        2+





  GRAM STAIN  Final  


        Final report





  GRAM STAIN RES 1  Final  


        Comment





        No white blood cells seen.





  GRAM STAIN RES 2  Final  


        No organisms seen





        Performed at:   - [facility], [place], [place]  [id number]


        : YAW Vidales MD, Phone:  [phone]





--------------------------------------------------------------

------------------------------

















                                   ** END OF REPORT **





Objective:


Assessment:


 


1.   Acute and chronic OM changes on CT abdomen and pelvis ,Pt has been treated 

with previous OM at [facility] in the past . Chronic OM could be from the same


ESR 35


S/P IR biopsy  


Cult negative 


2.  Crohn's disease with history of ostomy and multiple fistula repairs.


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 Wound Cults

negative from 2019


4. Gastroesophageal reflux disease.


4.  Obesity.


5.  Kidney stone on CT Abdomen





Plan:


Plan of Care


Cont trial with empiric zosyn/ daptomycin


script in chart


social service to assist with discharge antibiotics


Ideally would need  I and D of acute om, antibiotics alone will not be optimal


Recommended transfer to [facility] for I and D, ostectomy and possible flap 

if eligible


Per staff [facility] did not accept pt for transfer


PICC line  


weekly labs CBCD/BUN/CREAT/CPK/ESR/LFT


d/w pt about rationale with antibiotic treatment, adverse events and risk of 

failure


Probiotics 


 Off load


WOUND CARE per wound team





D/W ALEXANDER SANCHEZ MD            2020 10:28

## 2020-01-02 NOTE — PDOC
PROGRESS NOTES


Chief Complaint


Chief Complaint


IMPRESSION











Non-surgical perianal wound - with bleeding


Severe Crohn's with multiple surgeries s/p ostomy (multiple fissures and 

abdominal wounds)


Multiple deep ulcers along the distal sacrum and buttocks extend into the right 

ischiorectal fat and to the former site of the coccyx. The  coccyx and distal 

sacrum have been completely eroded. There is some debris within the tracts, but 

no drainable collection.


2. Large parastomal hernia on the right containing multiple nonobstructed 


small bowel loops


Chronic pain


Hyperlipidemia


Hypertension


GERD


Incidental 5 mm rt kidney stone


Hiatal hernia with small bowel loops





Cont trial with empiric zosyn/ daptomycin


CT-guided biopsy of the sacrum








PLAN transfer to [facility]/// Plastics for I and D, ostectomy and possible flap if 

eligible [facility] did not accept pt for transfer


PICC line  


social service to assist with discharge antibiotics








39 MIN PT EXAM, CHART REVIEW, > 50% OF TIME SPENT WITH EXAM, CHART REVIEW, PT 

CARE COORDINATION





History of Present Illness


History of Present Illness


01/01/20


Pt seen and examined


pleasant


Pt ambulated w/out difficulty


DW pt about their care


DW RN


Reviewed pt's chart 





 12/31/19


Patient seen and examined


Chart reviewed


Discussed with RN





Vitals


Vitals





Vital Signs








  Date Time  Temp Pulse Resp B/P (MAP) Pulse Ox O2 Delivery O2 Flow Rate FiO2


 


1/2/20 07:00 97.8 77 17 114/61 (78) 96 Room Air  





 97.8       











Physical Exam


Physical Exam


GENERAL:  Propped up in bed, alert, in no apparent distress.


HEENT:  Pupils equally round.  Oropharynx pink and moist.


NECK:  Supple, no lymphadenopathy.


LUNGS:  Clear to auscultation.


HEART:  S1, S2.


ABDOMEN:  Obese, soft, nontender, bowel sounds present.  Multiple scars and 

right-sided ostomy.


EXTREMITIES:  No gross edema or cyanosis.


SKIN:  Warm to touch without signs of rash. Several sacrococcygeal fistulas . 


NEUROLOGIC:  Alert and answering questions appropriately.  


PIV


General:  Alert, Oriented X3, Cooperative, No acute distress


Heart:  Regular rate, Normal S1, Normal S2


Lungs:  Clear


Abdomen:  Normal bowel sounds, Soft, Other (Multiple scars and right-sided 

ostomy.)


Extremities:  No clubbing, No cyanosis


Skin:  No rashes, Other ( Several sacrococcygeal fistula, 3 packed. + drainage, 

no surrounding redness  )





Labs


LABS





Laboratory Tests








Test


 1/2/20


05:55


 


White Blood Count


 6.7 x10^3/uL


(4.0-11.0)


 


Red Blood Count


 4.35 x10^6/uL


(4.30-5.70)


 


Hemoglobin


 13.1 g/dL


(13.0-17.5)


 


Hematocrit


 39.3 %


(39.0-53.0)


 


Mean Corpuscular Volume 90 fL () 


 


Mean Corpuscular Hemoglobin 30 pg (25-35) 


 


Mean Corpuscular Hemoglobin


Concent 33 g/dL


(31-37)


 


Red Cell Distribution Width


 13.9 %


(11.5-14.5)


 


Platelet Count


 153 x10^3/uL


(140-400)


 


Neutrophils (%) (Auto) 71 % (31-73) 


 


Lymphocytes (%) (Auto) 16 % (24-48) 


 


Monocytes (%) (Auto) 10 % (0-9) 


 


Eosinophils (%) (Auto) 3 % (0-3) 


 


Basophils (%) (Auto) 1 % (0-3) 


 


Neutrophils # (Auto)


 4.8 x10^3/uL


(1.8-7.7)


 


Lymphocytes # (Auto)


 1.0 x10^3/uL


(1.0-4.8)


 


Monocytes # (Auto)


 0.6 x10^3/uL


(0.0-1.1)


 


Eosinophils # (Auto)


 0.2 x10^3/uL


(0.0-0.7)


 


Basophils # (Auto)


 0.1 x10^3/uL


(0.0-0.2)


 


Sodium Level


 140 mmol/L


(136-145)


 


Potassium Level


 4.1 mmol/L


(3.5-5.1)


 


Chloride Level


 105 mmol/L


()


 


Carbon Dioxide Level


 27 mmol/L


(21-32)


 


Anion Gap 8 (6-14) 


 


Blood Urea Nitrogen


 13 mg/dL


(8-26)


 


Creatinine


 1.1 mg/dL


(0.7-1.3)


 


Estimated GFR


(Cockcroft-Gault) 68.3 





 


BUN/Creatinine Ratio 12 (6-20) 


 


Glucose Level


 98 mg/dL


(70-99)


 


Calcium Level


 8.6 mg/dL


(8.5-10.1)


 


Total Bilirubin


 0.3 mg/dL


(0.2-1.0)


 


Aspartate Amino Transf


(AST/SGOT) 17 U/L (15-37) 





 


Alanine Aminotransferase


(ALT/SGPT) 26 U/L (16-63) 





 


Alkaline Phosphatase


 85 U/L


()


 


Total Protein


 7.5 g/dL


(6.4-8.2)


 


Albumin


 2.9 g/dL


(3.4-5.0)


 


Albumin/Globulin Ratio 0.6 (1.0-1.7) 


 


Thyroid Stimulating Hormone


(TSH) 1.357 uIU/mL


(0.358-3.74)











Comment


Review of Relevant


I have reviewed the following items crow (where applicable) has been applied.


Labs





Laboratory Tests








Test


 1/1/20


05:35 1/2/20


05:55


 


Sodium Level


 141 mmol/L


(136-145) 140 mmol/L


(136-145)


 


Potassium Level


 4.0 mmol/L


(3.5-5.1) 4.1 mmol/L


(3.5-5.1)


 


Chloride Level


 106 mmol/L


() 105 mmol/L


()


 


Carbon Dioxide Level


 26 mmol/L


(21-32) 27 mmol/L


(21-32)


 


Anion Gap 9 (6-14)  8 (6-14) 


 


Blood Urea Nitrogen


 15 mg/dL


(8-26) 13 mg/dL


(8-26)


 


Creatinine


 1.1 mg/dL


(0.7-1.3) 1.1 mg/dL


(0.7-1.3)


 


Estimated GFR


(Cockcroft-Gault) 68.3 


 68.3 





 


Glucose Level


 104 mg/dL


(70-99) 98 mg/dL


(70-99)


 


Calcium Level


 8.8 mg/dL


(8.5-10.1) 8.6 mg/dL


(8.5-10.1)


 


White Blood Count


 


 6.7 x10^3/uL


(4.0-11.0)


 


Red Blood Count


 


 4.35 x10^6/uL


(4.30-5.70)


 


Hemoglobin


 


 13.1 g/dL


(13.0-17.5)


 


Hematocrit


 


 39.3 %


(39.0-53.0)


 


Mean Corpuscular Volume  90 fL () 


 


Mean Corpuscular Hemoglobin  30 pg (25-35) 


 


Mean Corpuscular Hemoglobin


Concent 


 33 g/dL


(31-37)


 


Red Cell Distribution Width


 


 13.9 %


(11.5-14.5)


 


Platelet Count


 


 153 x10^3/uL


(140-400)


 


Neutrophils (%) (Auto)  71 % (31-73) 


 


Lymphocytes (%) (Auto)  16 % (24-48) 


 


Monocytes (%) (Auto)  10 % (0-9) 


 


Eosinophils (%) (Auto)  3 % (0-3) 


 


Basophils (%) (Auto)  1 % (0-3) 


 


Neutrophils # (Auto)


 


 4.8 x10^3/uL


(1.8-7.7)


 


Lymphocytes # (Auto)


 


 1.0 x10^3/uL


(1.0-4.8)


 


Monocytes # (Auto)


 


 0.6 x10^3/uL


(0.0-1.1)


 


Eosinophils # (Auto)


 


 0.2 x10^3/uL


(0.0-0.7)


 


Basophils # (Auto)


 


 0.1 x10^3/uL


(0.0-0.2)


 


BUN/Creatinine Ratio  12 (6-20) 


 


Total Bilirubin


 


 0.3 mg/dL


(0.2-1.0)


 


Aspartate Amino Transf


(AST/SGOT) 


 17 U/L (15-37) 





 


Alanine Aminotransferase


(ALT/SGPT) 


 26 U/L (16-63) 





 


Alkaline Phosphatase


 


 85 U/L


()


 


Total Protein


 


 7.5 g/dL


(6.4-8.2)


 


Albumin


 


 2.9 g/dL


(3.4-5.0)


 


Albumin/Globulin Ratio  0.6 (1.0-1.7) 


 


Thyroid Stimulating Hormone


(TSH) 


 1.357 uIU/mL


(0.358-3.74)








Laboratory Tests








Test


 1/2/20


05:55


 


White Blood Count


 6.7 x10^3/uL


(4.0-11.0)


 


Red Blood Count


 4.35 x10^6/uL


(4.30-5.70)


 


Hemoglobin


 13.1 g/dL


(13.0-17.5)


 


Hematocrit


 39.3 %


(39.0-53.0)


 


Mean Corpuscular Volume 90 fL () 


 


Mean Corpuscular Hemoglobin 30 pg (25-35) 


 


Mean Corpuscular Hemoglobin


Concent 33 g/dL


(31-37)


 


Red Cell Distribution Width


 13.9 %


(11.5-14.5)


 


Platelet Count


 153 x10^3/uL


(140-400)


 


Neutrophils (%) (Auto) 71 % (31-73) 


 


Lymphocytes (%) (Auto) 16 % (24-48) 


 


Monocytes (%) (Auto) 10 % (0-9) 


 


Eosinophils (%) (Auto) 3 % (0-3) 


 


Basophils (%) (Auto) 1 % (0-3) 


 


Neutrophils # (Auto)


 4.8 x10^3/uL


(1.8-7.7)


 


Lymphocytes # (Auto)


 1.0 x10^3/uL


(1.0-4.8)


 


Monocytes # (Auto)


 0.6 x10^3/uL


(0.0-1.1)


 


Eosinophils # (Auto)


 0.2 x10^3/uL


(0.0-0.7)


 


Basophils # (Auto)


 0.1 x10^3/uL


(0.0-0.2)


 


Sodium Level


 140 mmol/L


(136-145)


 


Potassium Level


 4.1 mmol/L


(3.5-5.1)


 


Chloride Level


 105 mmol/L


()


 


Carbon Dioxide Level


 27 mmol/L


(21-32)


 


Anion Gap 8 (6-14) 


 


Blood Urea Nitrogen


 13 mg/dL


(8-26)


 


Creatinine


 1.1 mg/dL


(0.7-1.3)


 


Estimated GFR


(Cockcroft-Gault) 68.3 





 


BUN/Creatinine Ratio 12 (6-20) 


 


Glucose Level


 98 mg/dL


(70-99)


 


Calcium Level


 8.6 mg/dL


(8.5-10.1)


 


Total Bilirubin


 0.3 mg/dL


(0.2-1.0)


 


Aspartate Amino Transf


(AST/SGOT) 17 U/L (15-37) 





 


Alanine Aminotransferase


(ALT/SGPT) 26 U/L (16-63) 





 


Alkaline Phosphatase


 85 U/L


()


 


Total Protein


 7.5 g/dL


(6.4-8.2)


 


Albumin


 2.9 g/dL


(3.4-5.0)


 


Albumin/Globulin Ratio 0.6 (1.0-1.7) 


 


Thyroid Stimulating Hormone


(TSH) 1.357 uIU/mL


(0.358-3.74)








Microbiology


12/27/19 Anaerobic/Aerobic Culture - Final, Complete


           


12/27/19 Anaerobic Culture Result 1 (LION) - Final, Complete


           


12/27/19 Aerobic Culture - Final, Complete


           


12/27/19 Aerobic Culture Result 1 (LION) - Final, Complete


           


12/27/19 Gram Stain - Final, Complete


           


12/27/19 Gram Stain Result 1 (LION) - Final, Complete


           


12/27/19 Gram Stain Result 2 (LION) - Final, Complete


Medications





Current Medications


Acetaminophen (Tylenol) 650 mg PRN Q4HRS  PRN PO PAIN;  Start 12/23/19 at 14:00;

 Stop 12/25/19 at 08:20;  Status DC


Ergocalciferol (Vitamin D2) 50,000 unit QMONTH PO ;  Start 1/22/20 at 09:00


Famotidine (Pepcid) 20 mg HS PO  Last administered on 1/1/20at 20:43;  Start 

12/23/19 at 21:00


Guaifenesin (Robitussin Dm) 5 ml PRN QID  PRN PO COUGH;  Start 12/23/19 at 14:00


Ascorbic Acid (Vitamin C) 500 mg BID PO  Last administered on 1/1/20at 20:43;  

Start 12/23/19 at 21:00


Clindamycin HCl (Cleocin) 300 mg TID PO  Last administered on 12/24/19at 08:39; 

Start 12/23/19 at 14:00;  Stop 12/24/19 at 09:30;  Status DC


Magnesium Hydroxide (Milk Of Magnesia) 2,400 mg PRN DAILY  PRN PO CONSTIPATION; 

Start 12/23/19 at 14:00


Mesalamine (Delzicol) 800 mg TID PO  Last administered on 1/1/20at 20:43;  Start

12/23/19 at 15:00


Non-Formulary Medication (Protein Supplement (Nutritional Drink Mix)) 420 gm 

DAILY PO ;  Start 12/24/19 at 09:00;  Status UNV


Iohexol (Omnipaque 300 Mg/ml) 75 ml 1X  ONCE IV  Last administered on 12/24/19at

08:29;  Start 12/24/19 at 08:00;  Stop 12/24/19 at 08:01;  Status DC


Acetaminophen (Tylenol) 500 mg PRN Q6HRS  PRN PO MILD PAIN / TEMP;  Start 

12/25/19 at 08:30


Acetaminophen/ Codeine Phosphate (Tylenol #3) 1 tab PRN Q6HRS  PRN PO MODERATE 

PAIN Last administered on 12/27/19at 20:57;  Start 12/25/19 at 08:30


Oxycodone/ Acetaminophen (Percocet 5/325) 1 tab PRN Q4HRS  PRN PO SEVERE PAIN 

Last administered on 1/1/20at 16:44;  Start 12/25/19 at 08:30


Morphine Sulfate (Morphine Sulfate) 2 mg PRN Q2HR  PRN IV PAIN Last administered

on 12/30/19at 16:07;  Start 12/25/19 at 08:30


Ondansetron HCl (Zofran) 4 mg PRN Q6HRS  PRN IVP NAUSEA/VOMITING;  Start 

12/27/19 at 09:30


Diphenhydramine HCl (Benadryl) 25 mg PRN QHS  PRN PO INSOMNIA;  Start 12/27/19 

at 09:30


Lidocaine HCl (Buffered Lidocaine 1%) 3 ml STK-MED ONCE .ROUTE ;  Start 12/27/19

at 10:01;  Stop 12/27/19 at 10:02;  Status DC


Midazolam HCl (Versed) 2 mg STK-MED ONCE .ROUTE ;  Start 12/27/19 at 10:20;  

Stop 12/27/19 at 10:21;  Status DC


Fentanyl Citrate (Fentanyl 2ml Vial) 100 mcg STK-MED ONCE .ROUTE ;  Start 

12/27/19 at 10:20;  Stop 12/27/19 at 10:21;  Status DC


Lidocaine HCl (Buffered Lidocaine 1%) 3 ml 1X  ONCE IJ  Last administered on 

12/27/19at 10:58;  Start 12/27/19 at 11:00;  Stop 12/27/19 at 11:01;  Status DC


Midazolam HCl (Versed) 2 mg 1X  ONCE IV  Last administered on 12/27/19at 10:57; 

Start 12/27/19 at 11:00;  Stop 12/27/19 at 11:01;  Status DC


Fentanyl Citrate (Fentanyl 2ml Vial) 100 mcg 1X  ONCE IV  Last administered on 

12/27/19at 10:57;  Start 12/27/19 at 11:00;  Stop 12/27/19 at 11:01;  Status DC


Vancomycin HCl (Vanco Per Pharmacy) 1 each PRN DAILY  PRN MC SEE COMMENTS Last 

administered on 12/29/19at 07:39;  Start 12/27/19 at 13:00;  Stop 12/30/19 at 

11:36;  Status DC


Piperacillin Sod/ Tazobactam Sod (Zosyn Per Pharmacy) 1 each PRN DAILY  PRN MC 

SEE COMMENTS;  Start 12/27/19 at 13:00


Vancomycin HCl 2 gm/Sodium Chloride 500 ml @  250 mls/hr 1X  ONCE IV  Last 

administered on 12/27/19at 15:01;  Start 12/27/19 at 14:00;  Stop 12/27/19 at 

15:59;  Status DC


Piperacillin Sod/ Tazobactam Sod 3.375 gm/Sodium Chloride 50 ml @  100 mls/hr 

Q6HRS IV  Last administered on 1/2/20at 05:45;  Start 12/27/19 at 13:15


Vancomycin HCl 1.5 gm/Sodium Chloride 500 ml @  250 mls/hr Q12H IV  Last admini

stered on 12/29/19at 04:08;  Start 12/28/19 at 03:00;  Stop 12/29/19 at 06:00;  

Status DC


Vancomycin HCl (Vancomycin Trough Level) 1 each 1X  ONCE MC  Last administered 

on 12/29/19at 03:00;  Start 12/29/19 at 02:30;  Stop 12/29/19 at 02:31;  Status 

DC


Lactobacillus Rhamnosus (Culturelle) 1 cap BID PO  Last administered on 1/1/20at

20:43;  Start 12/28/19 at 21:00


Vancomycin HCl 1.5 gm/Sodium Chloride 500 ml @  250 mls/hr Q8H IV ;  Start 

12/29/19 at 12:00;  Stop 12/29/19 at 07:31;  Status DC


Vancomycin HCl (Vancomycin Trough Level) 1 each 1X  ONCE MC ;  Start 12/30/19 at

11:30;  Stop 12/29/19 at 07:31;  Status DC


Vancomycin HCl 2 gm/Sodium Chloride 500 ml @  250 mls/hr Q12H IV  Last 

administered on 12/30/19at 04:19;  Start 12/29/19 at 16:00;  Stop 12/30/19 at 

11:36;  Status DC


Vancomycin HCl (Vancomycin Trough Level) 1 each 1X  ONCE MC ;  Start 12/30/19 at

15:30;  Stop 12/30/19 at 11:36;  Status DC


Daptomycin 530 mg/ Sodium Chloride 50 ml @  100 mls/hr Q24H IV  Last 

administered on 1/1/20at 12:51;  Start 12/30/19 at 12:30


Lidocaine HCl (Buffered Lidocaine 1%) 3 ml STK-MED ONCE .ROUTE ;  Start 12/31/19

at 14:11;  Stop 12/31/19 at 14:11;  Status DC


Lidocaine HCl (Buffered Lidocaine 1%) 6 ml 1X  ONCE INJ ;  Start 12/31/19 at 

14:30;  Stop 12/31/19 at 14:31;  Status DC





Active Scripts


Active


Reported


Hydrocodone-Apap 5-325  ** (Hydrocodone Bit/Acetaminophen) 1 Tab Tablet 1-2 Tab 

PO PRN Q4HRS PRN


Tussin Dm Cough & Chest Syrup (Guaifenesin/Dextromethorphan) 118 Ml Syrup 5 Ml 

PO PRN QID PRN 12 Days


Milk Of Magnesia (Magnesium Hydroxide) 2,400 Mg/10 Ml Oral.susp 2,400 Mg PO PRN 

DAILY PRN


Tylenol (Acetaminophen) 325 Mg Tablet 650 Mg PO PRN Q4HRS PRN


Clindamycin Hcl 300 Mg Capsule 300 Mg PO TID


Vitamin D2 (Ergocalciferol (Vitamin D2)) 50,000 Unit Capsule 1 Cap PO QMONTH


Vitamin C (Ascorbate Calcium) 500 Mg Tablet 500 Mg PO BID


Nutritional Drink Mix (Protein Supplement) 420 Gm Powder 420 Gm PO DAILY


Multi-Vitamin Daily (Multivitamin) 1 Each Tablet 1 Each PO 


Famotidine 20 Mg Tablet 20 Mg PO HS


Asacol Hd (Mesalamine) 800 Mg Tablet.dr 1,600 Mg PO TID


Vitals/I & O





Vital Sign - Last 24 Hours








 1/1/20 1/1/20 1/1/20 1/1/20





 09:53 11:00 15:00 16:44


 


Temp  97.5 97.7 





  97.5 97.7 


 


Pulse  67 74 


 


Resp 16 16 16 16


 


B/P (MAP)  114/66 (82) 123/86 (98) 


 


Pulse Ox  96 96 


 


O2 Delivery Room Air Room Air Room Air Room Air


 


    





    





 1/1/20 1/1/20 1/1/20 1/1/20





 17:44 19:00 20:00 23:00


 


Temp  97.6  98.0





  97.6  98.0


 


Pulse  70  62


 


Resp 16 20  20


 


B/P (MAP)  118/66 (83)  107/63 (78)


 


Pulse Ox  95  95


 


O2 Delivery Room Air  Room Air Room Air


 


    





    





 1/2/20 1/2/20  





 03:00 07:00  


 


Temp 97.7 97.8  





 97.7 97.8  


 


Pulse 61 77  


 


Resp 20 17  


 


B/P (MAP) 117/64 (81) 114/61 (78)  


 


Pulse Ox 94 96  


 


O2 Delivery Room Air Room Air  














Intake and Output   


 


 1/1/20 1/1/20 1/2/20





 15:00 23:00 07:00


 


Intake Total  300 ml 600 ml


 


Output Total  200 ml 


 


Balance  100 ml 600 ml

















GUNJAN LOPEZ MD           Jan 2, 2020 09:21

## 2020-01-02 NOTE — NUR
late entry: Pt has 100% coverage for IV abx. [facility] can't see pt until Monday. Passed 
this info to Amie that pt might need a different HH agency.

## 2020-01-02 NOTE — RAD
Exam: Fluoroscopic and ultrasound guided right percutaneous inserted central

venous catheter placement 1/2/2020 6:55 AM



.Indication: LONG TERM ANTIBIOTICS



Technique: Informed oral and written consent were obtained. The right upper

extremity was prepped and draped using sterile barrier technique. All elements

of maximal sterile barrier technique including the use of a cap, mask, sterile

gown, sterile gloves, large sterile sheet, appropriate hand hygiene, and 2%

chlorhexidine for cutaneous antisepsis (or acceptable alternative antiseptic

per current guidelines) were followed for this procedure..



Real-time ultrasound demonstrated a patent right basilic vein which was 

prepped and draped in usual sterile fashion. 1% lidocaine used for local

anesthesia. Using real-time ultrasound guidance the access needle

percutaneously punctured the selected right basilic vein. Reference ultrasound

images were saved to the medical record.



A guidewire was advanced through the needle to the cavoatrial junction, and a

peel-away sheath placed.   The catheter was cut to length and inserted through

the peel-away sheath such that its tip is at the cavoatrial junction. The wire

and sheath were removed, and the catheter secured in place, and a sterile

dressing was applied. Catheter was found to flush and aspirate normally. No

immediate complications are identified.



FLUORO TIME:  0.6 min

DOSE AREA PRODUCT:  1 Gycm2



 Impression: Ultrasound and fluoroscopically guided placement of a right upper

extremity PICC line.

## 2020-01-02 NOTE — PATHOLOGY
[facility] Accession Number: [id number]

.                                                                01

Material submitted:                                        .

sacrum - SACRAL BONE BIOPSY

.                                                                01

Clinical history:                                          .

Osteomyelitis.

.                                                                02

**********************************************************************

Diagnosis:

Segment of bone and skeletal muscle and fibrous tissue, sacral bone needle

biopsy:

- Focal marrow fibrosis and mild chronic inflammation.

LBQ  12/31/2019  1645 Local

**********************************************************************

.                                                                02

Comment:

Sections of the sacral bone biopsy reveal bone and focal skeletal muscle

and fibrous tissue.  The segment of bone shows focal marrow fibrosis and

mild chronic inflammation.  The bony trabeculae appear viable and focally

thickened.  The histologic findings are non-specific but are consistent

with chronic osteomyelitis.  There is no evidence of an acute

osteomyelitis.  There is no evidence of malignancy.

(JPM/db; 12/31/2019)

.                                                                02

Electronically signed:                                     .

Otoniel Hudson MD, Pathologist

NPI- [id number]

.                                                                01

Gross description:                                         .

Received in formalin labeled "Kaur, Zachary, sacral bone tissue" is a

cylindrical core of tan-white bone measuring 1.0 cm in length and 0.3 cm

in diameter.  The specimen is slightly soft, and is submitted without

decalcification in cassette A1. ([facility]; 12/29/2019)

[facility]  12/29/2019  1116 Local

.                                                                02

Pathologist provided ICD-10:

M89.9

.                                                                02

CPT                                                        .

[id number]

Specimen Comment: A courtesy copy of this report has been sent to [phone], [phone]

Specimen Comment: [id number], [phone], [phone]

Specimen Comment: Report sent to ,DR VAIL,DR GARCÍA / DR PAULSON

***Performed at:  01

   [address], [place], [place]  [id number]

   MD Freddy Conner MD Phone:  [phone]

***Performed at:  02

   [address]  [id number]

   MD Otoniel Hudson MD Phone:  [phone]

## 2020-01-03 VITALS — DIASTOLIC BLOOD PRESSURE: 78 MMHG | SYSTOLIC BLOOD PRESSURE: 150 MMHG

## 2020-01-03 VITALS — SYSTOLIC BLOOD PRESSURE: 104 MMHG | DIASTOLIC BLOOD PRESSURE: 58 MMHG

## 2020-01-03 VITALS — DIASTOLIC BLOOD PRESSURE: 68 MMHG | SYSTOLIC BLOOD PRESSURE: 113 MMHG

## 2020-01-03 VITALS — DIASTOLIC BLOOD PRESSURE: 56 MMHG | SYSTOLIC BLOOD PRESSURE: 109 MMHG

## 2020-01-03 VITALS — SYSTOLIC BLOOD PRESSURE: 112 MMHG | DIASTOLIC BLOOD PRESSURE: 61 MMHG

## 2020-01-03 VITALS — SYSTOLIC BLOOD PRESSURE: 117 MMHG | DIASTOLIC BLOOD PRESSURE: 68 MMHG

## 2020-01-03 LAB
ANION GAP SERPL CALC-SCNC: 10 MMOL/L (ref 6–14)
BUN SERPL-MCNC: 16 MG/DL (ref 8–26)
CALCIUM SERPL-MCNC: 8.8 MG/DL (ref 8.5–10.1)
CHLORIDE SERPL-SCNC: 105 MMOL/L (ref 98–107)
CO2 SERPL-SCNC: 27 MMOL/L (ref 21–32)
CREAT SERPL-MCNC: 1.1 MG/DL (ref 0.7–1.3)
GFR SERPLBLD BASED ON 1.73 SQ M-ARVRAT: 68.3 ML/MIN
GLUCOSE SERPL-MCNC: 100 MG/DL (ref 70–99)
POTASSIUM SERPL-SCNC: 4 MMOL/L (ref 3.5–5.1)
SODIUM SERPL-SCNC: 142 MMOL/L (ref 136–145)

## 2020-01-03 RX ADMIN — OXYCODONE HYDROCHLORIDE AND ACETAMINOPHEN PRN TAB: 5; 325 TABLET ORAL at 21:43

## 2020-01-03 RX ADMIN — PIPERACILLIN SODIUM AND TAZOBACTAM SODIUM SCH MLS/HR: 3; .375 INJECTION, POWDER, LYOPHILIZED, FOR SOLUTION INTRAVENOUS at 00:10

## 2020-01-03 RX ADMIN — Medication SCH CAP: at 21:42

## 2020-01-03 RX ADMIN — MESALAMINE SCH MG: 400 CAPSULE, DELAYED RELEASE ORAL at 10:51

## 2020-01-03 RX ADMIN — PIPERACILLIN SODIUM AND TAZOBACTAM SODIUM SCH MLS/HR: 3; .375 INJECTION, POWDER, LYOPHILIZED, FOR SOLUTION INTRAVENOUS at 05:43

## 2020-01-03 RX ADMIN — PIPERACILLIN SODIUM AND TAZOBACTAM SODIUM SCH MLS/HR: 3; .375 INJECTION, POWDER, LYOPHILIZED, FOR SOLUTION INTRAVENOUS at 23:57

## 2020-01-03 RX ADMIN — DAPTOMYCIN SCH MLS/HR: 500 INJECTION, POWDER, LYOPHILIZED, FOR SOLUTION INTRAVENOUS at 13:43

## 2020-01-03 RX ADMIN — OXYCODONE HYDROCHLORIDE AND ACETAMINOPHEN SCH MG: 500 TABLET ORAL at 21:43

## 2020-01-03 RX ADMIN — Medication SCH CAP: at 10:51

## 2020-01-03 RX ADMIN — MESALAMINE SCH MG: 400 CAPSULE, DELAYED RELEASE ORAL at 21:43

## 2020-01-03 RX ADMIN — MESALAMINE SCH MG: 400 CAPSULE, DELAYED RELEASE ORAL at 15:40

## 2020-01-03 RX ADMIN — PIPERACILLIN SODIUM AND TAZOBACTAM SODIUM SCH MLS/HR: 3; .375 INJECTION, POWDER, LYOPHILIZED, FOR SOLUTION INTRAVENOUS at 18:25

## 2020-01-03 RX ADMIN — OXYCODONE HYDROCHLORIDE AND ACETAMINOPHEN SCH MG: 500 TABLET ORAL at 10:51

## 2020-01-03 RX ADMIN — FAMOTIDINE SCH MG: 20 TABLET ORAL at 21:43

## 2020-01-03 RX ADMIN — PIPERACILLIN SODIUM AND TAZOBACTAM SODIUM SCH MLS/HR: 3; .375 INJECTION, POWDER, LYOPHILIZED, FOR SOLUTION INTRAVENOUS at 15:40

## 2020-01-03 NOTE — NUR
Pt was provided a teaching but [facility] still would like a teach and pt is not able to return to 
[facility] until staff is tranied. There is no nursing staff until Monday-SW discussed pt needs IV 
abx 7 days of week but will need more assistance than as he might not be able to administer 
IV abx by himself. 

Discussed with nurse Rebekah, phone: [phone] at [facility] and [facility]. [facility] notified 
about this.

## 2020-01-03 NOTE — NUR
Late entry: SW following pt. Spoke with pt on 1/2/19 who requested if he can IV abx 
outpatient at [facility] in [place]. After speaking with OP team at [facility], 
they are not able to do the Zosyn as it is continuos infusion. 

Discussed with pt's staff at [facility] and they would like to get a teaching too and 
reported pt is not able to return on 1/2/19. 

ED spoke with pt again and discussed his option will be to do it at [facility] and his staff has 
agreed to assist. 

[facility] has provided teaching for pt yesterday and was awaiting to hear back from [facility] 
regarding arranging a teach.

ED faxed orders to [facility] as pt is currently on service with them.

## 2020-01-03 NOTE — PDOC
PROGRESS NOTES


Chief Complaint


Chief Complaint


IMPRESSION











Non-surgical perianal wound - with bleeding


Severe Crohn's with multiple surgeries s/p ostomy (multiple fissures and 

abdominal wounds)


Multiple deep ulcers along the distal sacrum and buttocks extend into the right 

ischiorectal fat and to the former site of the coccyx. The  coccyx and distal 

sacrum have been completely eroded. There is some debris within the tracts, but 

no drainable collection.


2. Large parastomal hernia on the right containing multiple nonobstructed 


small bowel loops


Chronic pain


Hyperlipidemia


Hypertension


GERD


Incidental 5 mm rt kidney stone


Hiatal hernia with small bowel loops





Cont trial with empiric zosyn/ daptomycin


CT-guided biopsy of the sacrum








PLAN transfer to [facility]/// Plastics for I and D, ostectomy and possible flap if 

eligible [facility] did not accept pt for transfer will need home health


PICC line  


social service to assist with discharge antibiotics








29 MIN PT EXAM, CHART REVIEW, > 50% OF TIME SPENT WITH EXAM, CHART REVIEW, PT 

CARE COORDINATION





History of Present Illness


History of Present Illness


01/01/20


Pt seen and examined


pleasant


Pt ambulated w/out difficulty


DW pt about their care


DW RN


Reviewed pt's chart 





 12/31/19


Patient seen and examined


Chart reviewed


Discussed with RN





Vitals


Vitals





Vital Signs








  Date Time  Temp Pulse Resp B/P (MAP) Pulse Ox O2 Delivery O2 Flow Rate FiO2


 


1/3/20 07:00 97.8 68 14 112/61 (78) 95 Room Air  





 97.8       


 


1/2/20 22:39       2.0 











Physical Exam


Physical Exam


GENERAL:  Propped up in bed, alert, in no apparent distress.


HEENT:  Pupils equally round.  Oropharynx pink and moist.


NECK:  Supple, no lymphadenopathy.


LUNGS:  Clear to auscultation.


HEART:  S1, S2.


ABDOMEN:  Obese, soft, nontender, bowel sounds present.  Multiple scars and 

right-sided ostomy.


EXTREMITIES:  No gross edema or cyanosis.


SKIN:  Warm to touch without signs of rash. Several sacrococcygeal fistulas . 


NEUROLOGIC:  Alert and answering questions appropriately.  


PIV


General:  Alert, Oriented X3, Cooperative, No acute distress


Heart:  Regular rate, Normal S1, Normal S2


Lungs:  Clear


Abdomen:  Normal bowel sounds, Soft, Other (Multiple scars and right-sided 

ostomy.)


Extremities:  No clubbing, No cyanosis


Skin:  No rashes, Other ( Several sacrococcygeal fistula, 3 packed. + drainage, 

no surrounding redness  )





Labs


LABS





Laboratory Tests








Test


 1/3/20


05:30


 


Sodium Level


 142 mmol/L


(136-145)


 


Potassium Level


 4.0 mmol/L


(3.5-5.1)


 


Chloride Level


 105 mmol/L


()


 


Carbon Dioxide Level


 27 mmol/L


(21-32)


 


Anion Gap 10 (6-14) 


 


Blood Urea Nitrogen


 16 mg/dL


(8-26)


 


Creatinine


 1.1 mg/dL


(0.7-1.3)


 


Estimated GFR


(Cockcroft-Gault) 68.3 





 


Glucose Level


 100 mg/dL


(70-99)


 


Calcium Level


 8.8 mg/dL


(8.5-10.1)











Comment


Review of Relevant


I have reviewed the following items crow (where applicable) has been applied.


Labs





Laboratory Tests








Test


 1/2/20


05:55 1/3/20


05:30


 


White Blood Count


 6.7 x10^3/uL


(4.0-11.0) 





 


Red Blood Count


 4.35 x10^6/uL


(4.30-5.70) 





 


Hemoglobin


 13.1 g/dL


(13.0-17.5) 





 


Hematocrit


 39.3 %


(39.0-53.0) 





 


Mean Corpuscular Volume 90 fL ()  


 


Mean Corpuscular Hemoglobin 30 pg (25-35)  


 


Mean Corpuscular Hemoglobin


Concent 33 g/dL


(31-37) 





 


Red Cell Distribution Width


 13.9 %


(11.5-14.5) 





 


Platelet Count


 153 x10^3/uL


(140-400) 





 


Neutrophils (%) (Auto) 71 % (31-73)  


 


Lymphocytes (%) (Auto) 16 % (24-48)  


 


Monocytes (%) (Auto) 10 % (0-9)  


 


Eosinophils (%) (Auto) 3 % (0-3)  


 


Basophils (%) (Auto) 1 % (0-3)  


 


Neutrophils # (Auto)


 4.8 x10^3/uL


(1.8-7.7) 





 


Lymphocytes # (Auto)


 1.0 x10^3/uL


(1.0-4.8) 





 


Monocytes # (Auto)


 0.6 x10^3/uL


(0.0-1.1) 





 


Eosinophils # (Auto)


 0.2 x10^3/uL


(0.0-0.7) 





 


Basophils # (Auto)


 0.1 x10^3/uL


(0.0-0.2) 





 


Sodium Level


 140 mmol/L


(136-145) 142 mmol/L


(136-145)


 


Potassium Level


 4.1 mmol/L


(3.5-5.1) 4.0 mmol/L


(3.5-5.1)


 


Chloride Level


 105 mmol/L


() 105 mmol/L


()


 


Carbon Dioxide Level


 27 mmol/L


(21-32) 27 mmol/L


(21-32)


 


Anion Gap 8 (6-14)  10 (6-14) 


 


Blood Urea Nitrogen


 13 mg/dL


(8-26) 16 mg/dL


(8-26)


 


Creatinine


 1.1 mg/dL


(0.7-1.3) 1.1 mg/dL


(0.7-1.3)


 


Estimated GFR


(Cockcroft-Gault) 68.3 


 68.3 





 


BUN/Creatinine Ratio 12 (6-20)  


 


Glucose Level


 98 mg/dL


(70-99) 100 mg/dL


(70-99)


 


Calcium Level


 8.6 mg/dL


(8.5-10.1) 8.8 mg/dL


(8.5-10.1)


 


Total Bilirubin


 0.3 mg/dL


(0.2-1.0) 





 


Aspartate Amino Transf


(AST/SGOT) 17 U/L (15-37) 


 





 


Alanine Aminotransferase


(ALT/SGPT) 26 U/L (16-63) 


 





 


Alkaline Phosphatase


 85 U/L


() 





 


Total Protein


 7.5 g/dL


(6.4-8.2) 





 


Albumin


 2.9 g/dL


(3.4-5.0) 





 


Albumin/Globulin Ratio 0.6 (1.0-1.7)  


 


Thyroid Stimulating Hormone


(TSH) 1.357 uIU/mL


(0.358-3.74) 











Laboratory Tests








Test


 1/3/20


05:30


 


Sodium Level


 142 mmol/L


(136-145)


 


Potassium Level


 4.0 mmol/L


(3.5-5.1)


 


Chloride Level


 105 mmol/L


()


 


Carbon Dioxide Level


 27 mmol/L


(21-32)


 


Anion Gap 10 (6-14) 


 


Blood Urea Nitrogen


 16 mg/dL


(8-26)


 


Creatinine


 1.1 mg/dL


(0.7-1.3)


 


Estimated GFR


(Cockcroft-Gault) 68.3 





 


Glucose Level


 100 mg/dL


(70-99)


 


Calcium Level


 8.8 mg/dL


(8.5-10.1)








Microbiology


12/27/19 Anaerobic/Aerobic Culture - Final, Complete


           


12/27/19 Anaerobic Culture Result 1 (LION) - Final, Complete


           


12/27/19 Aerobic Culture - Final, Complete


           


12/27/19 Aerobic Culture Result 1 (LION) - Final, Complete


           


12/27/19 Gram Stain - Final, Complete


           


12/27/19 Gram Stain Result 1 (LION) - Final, Complete


           


12/27/19 Gram Stain Result 2 (LION) - Final, Complete


Medications





Current Medications


Acetaminophen (Tylenol) 650 mg PRN Q4HRS  PRN PO PAIN;  Start 12/23/19 at 14:00;

 Stop 12/25/19 at 08:20;  Status DC


Ergocalciferol (Vitamin D2) 50,000 unit QMONTH PO ;  Start 1/22/20 at 09:00


Famotidine (Pepcid) 20 mg HS PO  Last administered on 1/2/20at 20:42;  Start 

12/23/19 at 21:00


Guaifenesin (Robitussin Dm) 5 ml PRN QID  PRN PO COUGH;  Start 12/23/19 at 14:00


Ascorbic Acid (Vitamin C) 500 mg BID PO  Last administered on 1/3/20at 10:51;  

Start 12/23/19 at 21:00


Clindamycin HCl (Cleocin) 300 mg TID PO  Last administered on 12/24/19at 08:39; 

Start 12/23/19 at 14:00;  Stop 12/24/19 at 09:30;  Status DC


Magnesium Hydroxide (Milk Of Magnesia) 2,400 mg PRN DAILY  PRN PO CONSTIPATION; 

Start 12/23/19 at 14:00


Mesalamine (Delzicol) 800 mg TID PO  Last administered on 1/3/20at 10:51;  Start

12/23/19 at 15:00


Non-Formulary Medication (Protein Supplement (Nutritional Drink Mix)) 420 gm DON

LY PO ;  Start 12/24/19 at 09:00;  Status UNV


Iohexol (Omnipaque 300 Mg/ml) 75 ml 1X  ONCE IV  Last administered on 12/24/19at

08:29;  Start 12/24/19 at 08:00;  Stop 12/24/19 at 08:01;  Status DC


Acetaminophen (Tylenol) 500 mg PRN Q6HRS  PRN PO MILD PAIN / TEMP;  Start 

12/25/19 at 08:30


Acetaminophen/ Codeine Phosphate (Tylenol #3) 1 tab PRN Q6HRS  PRN PO MODERATE 

PAIN Last administered on 12/27/19at 20:57;  Start 12/25/19 at 08:30


Oxycodone/ Acetaminophen (Percocet 5/325) 1 tab PRN Q4HRS  PRN PO SEVERE PAIN 

Last administered on 1/2/20at 22:39;  Start 12/25/19 at 08:30


Morphine Sulfate (Morphine Sulfate) 2 mg PRN Q2HR  PRN IV PAIN Last administered

on 12/30/19at 16:07;  Start 12/25/19 at 08:30


Ondansetron HCl (Zofran) 4 mg PRN Q6HRS  PRN IVP NAUSEA/VOMITING;  Start 

12/27/19 at 09:30


Diphenhydramine HCl (Benadryl) 25 mg PRN QHS  PRN PO INSOMNIA;  Start 12/27/19 

at 09:30


Lidocaine HCl (Buffered Lidocaine 1%) 3 ml STK-MED ONCE .ROUTE ;  Start 12/27/19

at 10:01;  Stop 12/27/19 at 10:02;  Status DC


Midazolam HCl (Versed) 2 mg STK-MED ONCE .ROUTE ;  Start 12/27/19 at 10:20;  

Stop 12/27/19 at 10:21;  Status DC


Fentanyl Citrate (Fentanyl 2ml Vial) 100 mcg STK-MED ONCE .ROUTE ;  Start 

12/27/19 at 10:20;  Stop 12/27/19 at 10:21;  Status DC


Lidocaine HCl (Buffered Lidocaine 1%) 3 ml 1X  ONCE IJ  Last administered on 

12/27/19at 10:58;  Start 12/27/19 at 11:00;  Stop 12/27/19 at 11:01;  Status DC


Midazolam HCl (Versed) 2 mg 1X  ONCE IV  Last administered on 12/27/19at 10:57; 

Start 12/27/19 at 11:00;  Stop 12/27/19 at 11:01;  Status DC


Fentanyl Citrate (Fentanyl 2ml Vial) 100 mcg 1X  ONCE IV  Last administered on 

12/27/19at 10:57;  Start 12/27/19 at 11:00;  Stop 12/27/19 at 11:01;  Status DC


Vancomycin HCl (Vanco Per Pharmacy) 1 each PRN DAILY  PRN MC SEE COMMENTS Last 

administered on 12/29/19at 07:39;  Start 12/27/19 at 13:00;  Stop 12/30/19 at 

11:36;  Status DC


Piperacillin Sod/ Tazobactam Sod (Zosyn Per Pharmacy) 1 each PRN DAILY  PRN MC 

SEE COMMENTS;  Start 12/27/19 at 13:00


Vancomycin HCl 2 gm/Sodium Chloride 500 ml @  250 mls/hr 1X  ONCE IV  Last 

administered on 12/27/19at 15:01;  Start 12/27/19 at 14:00;  Stop 12/27/19 at 

15:59;  Status DC


Piperacillin Sod/ Tazobactam Sod 3.375 gm/Sodium Chloride 50 ml @  100 mls/hr 

Q6HRS IV  Last administered on 1/3/20at 05:43;  Start 12/27/19 at 13:15


Vancomycin HCl 1.5 gm/Sodium Chloride 500 ml @  250 mls/hr Q12H IV  Last 

administered on 12/29/19at 04:08;  Start 12/28/19 at 03:00;  Stop 12/29/19 at 

06:00;  Status DC


Vancomycin HCl (Vancomycin Trough Level) 1 each 1X  ONCE MC  Last administered 

on 12/29/19at 03:00;  Start 12/29/19 at 02:30;  Stop 12/29/19 at 02:31;  Status 

DC


Lactobacillus Rhamnosus (Culturelle) 1 cap BID PO  Last administered on 1/3/20at

10:51;  Start 12/28/19 at 21:00


Vancomycin HCl 1.5 gm/Sodium Chloride 500 ml @  250 mls/hr Q8H IV ;  Start 

12/29/19 at 12:00;  Stop 12/29/19 at 07:31;  Status DC


Vancomycin HCl (Vancomycin Trough Level) 1 each 1X  ONCE MC ;  Start 12/30/19 at

11:30;  Stop 12/29/19 at 07:31;  Status DC


Vancomycin HCl 2 gm/Sodium Chloride 500 ml @  250 mls/hr Q12H IV  Last 

administered on 12/30/19at 04:19;  Start 12/29/19 at 16:00;  Stop 12/30/19 at 

11:36;  Status DC


Vancomycin HCl (Vancomycin Trough Level) 1 each 1X  ONCE MC ;  Start 12/30/19 at

15:30;  Stop 12/30/19 at 11:36;  Status DC


Daptomycin 530 mg/ Sodium Chloride 50 ml @  100 mls/hr Q24H IV  Last 

administered on 1/2/20at 11:41;  Start 12/30/19 at 12:30


Lidocaine HCl (Buffered Lidocaine 1%) 3 ml STK-MED ONCE .ROUTE ;  Start 12/31/19

at 14:11;  Stop 12/31/19 at 14:11;  Status DC


Lidocaine HCl (Buffered Lidocaine 1%) 6 ml 1X  ONCE INJ ;  Start 12/31/19 at 

14:30;  Stop 12/31/19 at 14:31;  Status DC





Active Scripts


Active


Reported


Hydrocodone-Apap 5-325  ** (Hydrocodone Bit/Acetaminophen) 1 Tab Tablet 1-2 Tab 

PO PRN Q4HRS PRN


Tussin Dm Cough & Chest Syrup (Guaifenesin/Dextromethorphan) 118 Ml Syrup 5 Ml 

PO PRN QID PRN 12 Days


Milk Of Magnesia (Magnesium Hydroxide) 2,400 Mg/10 Ml Oral.susp 2,400 Mg PO PRN 

DAILY PRN


Tylenol (Acetaminophen) 325 Mg Tablet 650 Mg PO PRN Q4HRS PRN


Clindamycin Hcl 300 Mg Capsule 300 Mg PO TID


Vitamin D2 (Ergocalciferol (Vitamin D2)) 50,000 Unit Capsule 1 Cap PO QMONTH


Vitamin C (Ascorbate Calcium) 500 Mg Tablet 500 Mg PO BID


Nutritional Drink Mix (Protein Supplement) 420 Gm Powder 420 Gm PO DAILY


Multi-Vitamin Daily (Multivitamin) 1 Each Tablet 1 Each PO 


Famotidine 20 Mg Tablet 20 Mg PO HS


Asacol Hd (Mesalamine) 800 Mg Tablet.dr 1,600 Mg PO TID


Vitals/I & O





Vital Sign - Last 24 Hours








 1/2/20 1/2/20 1/2/20 1/2/20





 14:46 15:00 15:46 19:00


 


Temp    98.0





    98.0


 


Pulse  62  66


 


Resp 16 17 16 18


 


B/P (MAP)  107/66 (80)  108/57 (74)


 


Pulse Ox  96  96


 


O2 Delivery Room Air Room Air Room Air Room Air


 


    





    





 1/2/20 1/2/20 1/2/20 1/3/20





 22:39 23:00 23:39 03:00


 


Temp  98.1  





  98.1  


 


Pulse  66  57


 


Resp 20 20 20 18


 


B/P (MAP)  114/62 (79)  113/68 (83)


 


Pulse Ox 96 96 97 97


 


O2 Delivery Room Air Room Air Room Air Room Air


 


O2 Flow Rate 2.0   


 


    





    





 1/3/20   





 07:00   


 


Temp 97.8   





 97.8   


 


Pulse 68   


 


Resp 14   


 


B/P (MAP) 112/61 (78)   


 


Pulse Ox 95   


 


O2 Delivery Room Air   














Intake and Output   


 


 1/2/20 1/2/20 1/3/20





 15:00 23:00 07:00


 


Intake Total   50 ml


 


Output Total   0 ml


 


Balance   50 ml

















GUNJAN LOPEZ MD           Christopher 3, 2020 11:52

## 2020-01-04 VITALS — DIASTOLIC BLOOD PRESSURE: 56 MMHG | SYSTOLIC BLOOD PRESSURE: 100 MMHG

## 2020-01-04 VITALS — DIASTOLIC BLOOD PRESSURE: 60 MMHG | SYSTOLIC BLOOD PRESSURE: 111 MMHG

## 2020-01-04 VITALS — DIASTOLIC BLOOD PRESSURE: 42 MMHG | SYSTOLIC BLOOD PRESSURE: 126 MMHG

## 2020-01-04 VITALS — SYSTOLIC BLOOD PRESSURE: 91 MMHG | DIASTOLIC BLOOD PRESSURE: 54 MMHG

## 2020-01-04 VITALS — DIASTOLIC BLOOD PRESSURE: 53 MMHG | SYSTOLIC BLOOD PRESSURE: 98 MMHG

## 2020-01-04 VITALS — DIASTOLIC BLOOD PRESSURE: 59 MMHG | SYSTOLIC BLOOD PRESSURE: 113 MMHG

## 2020-01-04 LAB
ANION GAP SERPL CALC-SCNC: 6 MMOL/L (ref 6–14)
BUN SERPL-MCNC: 15 MG/DL (ref 8–26)
CALCIUM SERPL-MCNC: 8.6 MG/DL (ref 8.5–10.1)
CHLORIDE SERPL-SCNC: 106 MMOL/L (ref 98–107)
CO2 SERPL-SCNC: 29 MMOL/L (ref 21–32)
CREAT SERPL-MCNC: 1.1 MG/DL (ref 0.7–1.3)
GFR SERPLBLD BASED ON 1.73 SQ M-ARVRAT: 68.3 ML/MIN
GLUCOSE SERPL-MCNC: 94 MG/DL (ref 70–99)
POTASSIUM SERPL-SCNC: 4 MMOL/L (ref 3.5–5.1)
SODIUM SERPL-SCNC: 141 MMOL/L (ref 136–145)

## 2020-01-04 RX ADMIN — PIPERACILLIN SODIUM AND TAZOBACTAM SODIUM SCH MLS/HR: 3; .375 INJECTION, POWDER, LYOPHILIZED, FOR SOLUTION INTRAVENOUS at 05:14

## 2020-01-04 RX ADMIN — FAMOTIDINE SCH MG: 20 TABLET ORAL at 20:32

## 2020-01-04 RX ADMIN — MESALAMINE SCH MG: 400 CAPSULE, DELAYED RELEASE ORAL at 09:22

## 2020-01-04 RX ADMIN — MESALAMINE SCH MG: 400 CAPSULE, DELAYED RELEASE ORAL at 14:31

## 2020-01-04 RX ADMIN — PIPERACILLIN SODIUM AND TAZOBACTAM SODIUM SCH MLS/HR: 3; .375 INJECTION, POWDER, LYOPHILIZED, FOR SOLUTION INTRAVENOUS at 13:44

## 2020-01-04 RX ADMIN — OXYCODONE HYDROCHLORIDE AND ACETAMINOPHEN SCH MG: 500 TABLET ORAL at 09:22

## 2020-01-04 RX ADMIN — Medication SCH CAP: at 20:32

## 2020-01-04 RX ADMIN — OXYCODONE HYDROCHLORIDE AND ACETAMINOPHEN SCH MG: 500 TABLET ORAL at 20:32

## 2020-01-04 RX ADMIN — OXYCODONE HYDROCHLORIDE AND ACETAMINOPHEN PRN TAB: 5; 325 TABLET ORAL at 20:36

## 2020-01-04 RX ADMIN — DAPTOMYCIN SCH MLS/HR: 500 INJECTION, POWDER, LYOPHILIZED, FOR SOLUTION INTRAVENOUS at 13:53

## 2020-01-04 RX ADMIN — MESALAMINE SCH MG: 400 CAPSULE, DELAYED RELEASE ORAL at 20:32

## 2020-01-04 RX ADMIN — PIPERACILLIN SODIUM AND TAZOBACTAM SODIUM SCH MLS/HR: 3; .375 INJECTION, POWDER, LYOPHILIZED, FOR SOLUTION INTRAVENOUS at 17:40

## 2020-01-04 RX ADMIN — Medication SCH CAP: at 09:22

## 2020-01-04 NOTE — PDOC
[employer] PROGRESS NOTE


Chief Complaint


Chief Complaint


IMPRESSION











Non-surgical perianal wound - with bleeding


Severe Crohn's with multiple surgeries s/p ostomy (multiple fissures and 

abdominal wounds)


Multiple deep ulcers along the distal sacrum and buttocks extend into the right 

ischiorectal fat and to the former site of the coccyx. The  coccyx and distal 

sacrum have been completely eroded. There is some debris within the tracts, but 

no drainable collection.


2. Large parastomal hernia on the right containing multiple nonobstructed 


small bowel loops


Chronic pain


Hyperlipidemia


Hypertension


GERD


Incidental 5 mm rt kidney stone


Hiatal hernia with small bowel loops





Cont trial with empiric zosyn/ daptomycin


CT-guided biopsy of the sacrum








PLAN transfer to [facility]/// Plastics for I and D, ostectomy and possible flap if 

eligible [facility] did not accept pt for transfer will need home health


PICC line  


social service to assist with discharge antibiotics








29 MIN PT EXAM, CHART REVIEW, > 50% OF TIME SPENT WITH EXAM, CHART REVIEW, PT 

CARE COORDINATION





History of Present Illness


History of Present Illness


[id number]


Patient seen and examined


Discussed with RN


Chart reviewed


His wife is present


Still having a lot of pain








01/01/20


Pt seen and examined


pleasant


Pt ambulated w/out difficulty


DW pt about their care


DW RN


Reviewed pt's chart 





 12/31/19


Patient seen and examined


Chart reviewed


Discussed with RN





Vitals/I&O


Vitals/I&O:





                                   Vital Signs








  Date Time  Temp Pulse Resp B/P (MAP) Pulse Ox O2 Delivery O2 Flow Rate FiO2


 


1/4/20 07:59 97.8 69 18 91/54 (66) 95 Room Air  





 97.8       














                                    I & O   


 


 1/3/20 1/3/20 1/4/20





 15:00 23:00 07:00


 


Intake Total  420 ml 850 ml


 


Balance  420 ml 850 ml











Physical Exam


Physical Exam:


GENERAL:  Propped up in bed, alert, in no apparent distress.


HEENT:  Pupils equally round.  Oropharynx pink and moist.


NECK:  Supple, no lymphadenopathy.


LUNGS:  Clear to auscultation.


HEART:  S1, S2.


ABDOMEN:  Obese, soft, nontender, bowel sounds present.  Multiple scars and 

right-sided ostomy.


EXTREMITIES:  No gross edema or cyanosis.


SKIN:  Warm to touch without signs of rash. Several sacrococcygeal fistulas . 


NEUROLOGIC:  Alert and answering questions appropriately.  


PIV


General:  Alert, Oriented X3, Cooperative, No acute distress


Heart:  Regular rate, Normal S1, Normal S2


Lungs:  Clear


Abdomen:  Normal bowel sounds, Soft, Other (Multiple scars and right-sided 

ostomy.)


Extremities:  No clubbing, No cyanosis


Skin:  No rashes, Other ( Several sacrococcygeal fistula, 3 packed. + drainage, 

no surrounding redness  )





Labs


Labs:





Laboratory Tests








Test


 1/4/20


05:15


 


Sodium Level


 141 mmol/L


(136-145)


 


Potassium Level


 4.0 mmol/L


(3.5-5.1)


 


Chloride Level


 106 mmol/L


()


 


Carbon Dioxide Level


 29 mmol/L


(21-32)


 


Anion Gap 6 (6-14) 


 


Blood Urea Nitrogen


 15 mg/dL


(8-26)


 


Creatinine


 1.1 mg/dL


(0.7-1.3)


 


Estimated GFR


(Cockcroft-Gault) 68.3 





 


Glucose Level


 94 mg/dL


(70-99)


 


Calcium Level


 8.6 mg/dL


(8.5-10.1)











Assessment and Plan


Assessmemt and Plan


Non-surgical perianal wound - with bleeding


Severe Crohn's with multiple surgeries s/p ostomy (multiple fissures and 

abdominal wounds)


Chronic pain


Hyperlipidemia


Hypertension


GERD


Incidental 5 mm rt kidney stone


Hiatal hernia with small bowel loops





Plan


IV antibiotics


Wound care


Home meds


PT OT


DVT prophylaxis


Discharge when okay with subspecialist





Comment


Review of Relevant


I have reviewed the following items crow (where applicable) has been applied.











RANDALL PAULSON III DO            Jan 4, 2020 12:37

## 2020-01-05 VITALS — DIASTOLIC BLOOD PRESSURE: 63 MMHG | SYSTOLIC BLOOD PRESSURE: 107 MMHG

## 2020-01-05 VITALS — DIASTOLIC BLOOD PRESSURE: 64 MMHG | SYSTOLIC BLOOD PRESSURE: 116 MMHG

## 2020-01-05 VITALS — SYSTOLIC BLOOD PRESSURE: 108 MMHG | DIASTOLIC BLOOD PRESSURE: 51 MMHG

## 2020-01-05 VITALS — DIASTOLIC BLOOD PRESSURE: 47 MMHG | SYSTOLIC BLOOD PRESSURE: 104 MMHG

## 2020-01-05 VITALS — DIASTOLIC BLOOD PRESSURE: 53 MMHG | SYSTOLIC BLOOD PRESSURE: 105 MMHG

## 2020-01-05 VITALS — DIASTOLIC BLOOD PRESSURE: 53 MMHG | SYSTOLIC BLOOD PRESSURE: 100 MMHG

## 2020-01-05 LAB
ANION GAP SERPL CALC-SCNC: 10 MMOL/L (ref 6–14)
BUN SERPL-MCNC: 17 MG/DL (ref 8–26)
CALCIUM SERPL-MCNC: 9 MG/DL (ref 8.5–10.1)
CHLORIDE SERPL-SCNC: 107 MMOL/L (ref 98–107)
CO2 SERPL-SCNC: 24 MMOL/L (ref 21–32)
CREAT SERPL-MCNC: 1.2 MG/DL (ref 0.7–1.3)
GFR SERPLBLD BASED ON 1.73 SQ M-ARVRAT: 61.8 ML/MIN
GLUCOSE SERPL-MCNC: 97 MG/DL (ref 70–99)
POTASSIUM SERPL-SCNC: 3.9 MMOL/L (ref 3.5–5.1)
SODIUM SERPL-SCNC: 141 MMOL/L (ref 136–145)

## 2020-01-05 RX ADMIN — OXYCODONE HYDROCHLORIDE AND ACETAMINOPHEN SCH MG: 500 TABLET ORAL at 10:30

## 2020-01-05 RX ADMIN — Medication SCH CAP: at 10:30

## 2020-01-05 RX ADMIN — PIPERACILLIN SODIUM AND TAZOBACTAM SODIUM SCH MLS/HR: 3; .375 INJECTION, POWDER, LYOPHILIZED, FOR SOLUTION INTRAVENOUS at 12:27

## 2020-01-05 RX ADMIN — PIPERACILLIN SODIUM AND TAZOBACTAM SODIUM SCH MLS/HR: 3; .375 INJECTION, POWDER, LYOPHILIZED, FOR SOLUTION INTRAVENOUS at 00:25

## 2020-01-05 RX ADMIN — MESALAMINE SCH MG: 400 CAPSULE, DELAYED RELEASE ORAL at 20:43

## 2020-01-05 RX ADMIN — MESALAMINE SCH MG: 400 CAPSULE, DELAYED RELEASE ORAL at 14:27

## 2020-01-05 RX ADMIN — PIPERACILLIN SODIUM AND TAZOBACTAM SODIUM SCH MLS/HR: 3; .375 INJECTION, POWDER, LYOPHILIZED, FOR SOLUTION INTRAVENOUS at 06:01

## 2020-01-05 RX ADMIN — MESALAMINE SCH MG: 400 CAPSULE, DELAYED RELEASE ORAL at 10:30

## 2020-01-05 RX ADMIN — PIPERACILLIN SODIUM AND TAZOBACTAM SODIUM SCH MLS/HR: 3; .375 INJECTION, POWDER, LYOPHILIZED, FOR SOLUTION INTRAVENOUS at 18:08

## 2020-01-05 RX ADMIN — FAMOTIDINE SCH MG: 20 TABLET ORAL at 20:43

## 2020-01-05 RX ADMIN — Medication SCH CAP: at 20:43

## 2020-01-05 RX ADMIN — OXYCODONE HYDROCHLORIDE AND ACETAMINOPHEN PRN TAB: 5; 325 TABLET ORAL at 15:38

## 2020-01-05 RX ADMIN — DAPTOMYCIN SCH MLS/HR: 500 INJECTION, POWDER, LYOPHILIZED, FOR SOLUTION INTRAVENOUS at 14:27

## 2020-01-05 RX ADMIN — OXYCODONE HYDROCHLORIDE AND ACETAMINOPHEN SCH MG: 500 TABLET ORAL at 20:43

## 2020-01-05 NOTE — PDOC
Infectious Disease Note


Subjective


Subjective


Comfortable, but tired


No c/o F/C/N/V/aches





ROS


ROS


per HPI





Vital Sign


Vital Signs





Vital Signs








  Date Time  Temp Pulse Resp B/P (MAP) Pulse Ox O2 Delivery O2 Flow Rate FiO2


 


1/5/20 07:59 97.8 64 18 100/53 (69) 96 Room Air  





 97.8       











Physical Exam


PHYSICAL EXAM


GENERAL:  Propped up in bed, resting quietly, arouses to name 


HEENT:  Pupils equally round.  Oropharynx pink and moist.


NECK:  Supple, no lymphadenopathy.


LUNGS:  Clear to auscultation.


HEART:  S1, S2.


ABDOMEN:  Obese, soft, nontender, bowel sounds present.  Multiple scars and 

right-sided ostomy.


EXTREMITIES:  No gross edema or cyanosis.


SKIN:  Warm to touch without signs of rash. Several sacrococcygeal fistulas  


NEUROLOGIC:  Answering questions appropriately.  


[facility] (12/31) clean





Labs


Lab





Laboratory Tests








Test


 1/5/20


03:05


 


Sodium Level


 141 mmol/L


(136-145)


 


Potassium Level


 3.9 mmol/L


(3.5-5.1)


 


Chloride Level


 107 mmol/L


()


 


Carbon Dioxide Level


 24 mmol/L


(21-32)


 


Anion Gap 10 (6-14) 


 


Blood Urea Nitrogen


 17 mg/dL


(8-26)


 


Creatinine


 1.2 mg/dL


(0.7-1.3)


 


Estimated GFR


(Cockcroft-Gault) 61.8 





 


Glucose Level


 97 mg/dL


(70-99)


 


Calcium Level


 9.0 mg/dL


(8.5-10.1)








Micro





Microbiology


12/23/19 Anaerobic/Aerobic Culture - Final, Complete


           


12/23/19 Anaerobic Culture Result 1 (LION) - Final, Complete


           


12/23/19 Aerobic Culture - Final, Complete


           


12/23/19 Aerobic Culture Result 1 (LION) - Final, Complete


           


12/23/19 Gram Stain - Final, Complete


           


12/23/19 Gram Stain Result 1 (LION) - Final, Complete


           


12/23/19 Gram Stain Result 2 (LION) - Final, Complete





Objective


Assessment


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 culture neg 

so far 


Crohn's disease with history of ostomy and multiple fistula repairs.


Acute and chronic OM changes on CT abdomen and pelvis ,Pt has been treated with 

previous OM at [facility] in the past . Chronic OM could be from the same


    - ESR 35


    - s/p IR biopsy 12/27


Gastroesophageal reflux disease.


Obesity.


Kidney stone on CT Abdomen








Plan


Plan of Care


Cont trial with empiric dapto and zosyn 


script in chart


Probiotics 


PICC care





Social service to assist with discharge antibiotics


Ideally would need  I and D of acute om, antibiotics alone will not be optimal


d/w pt about rationale with antibiotic treatment, adverse events and risk of 

failure


Recommended transfer to [facility] for I and D, ostectomy and possible flap 

if eligible


Per staff [facility] did not accept pt for transfer








weekly labs CBCD/BUN/CREAT/CPK/ESR/LFT. faxed to [phone]


WOUND CARE per wound team


Offloading


Attending Co-Sign


The patient was seen and interviewed as well as examined at the bedside. The 

chart was reviewed. The case was discussed. Agree with the plan of care.











CASEY CHE         Jan 5, 2020 13:17


DI VAIL MD                Jan 5, 2020 14:13

## 2020-01-05 NOTE — PDOC
[employer] PROGRESS NOTE


Chief Complaint


Chief Complaint


IMPRESSION











Non-surgical perianal wound - with bleeding


Severe Crohn's with multiple surgeries s/p ostomy (multiple fissures and 

abdominal wounds)


Multiple deep ulcers along the distal sacrum and buttocks extend into the right 

ischiorectal fat and to the former site of the coccyx. The  coccyx and distal 

sacrum have been completely eroded. There is some debris within the tracts, but 

no drainable collection.


2. Large parastomal hernia on the right containing multiple nonobstructed 


small bowel loops


Chronic pain


Hyperlipidemia


Hypertension


GERD


Incidental 5 mm rt kidney stone


Hiatal hernia with small bowel loops





Cont trial with empiric zosyn/ daptomycin


CT-guided biopsy of the sacrum








PLAN transfer to [facility]/// Plastics for I and D, ostectomy and possible flap if 

eligible [facility] did not accept pt for transfer will need home health


PICC line  


social service to assist with discharge antibiotics








29 MIN PT EXAM, CHART REVIEW, > 50% OF TIME SPENT WITH EXAM, CHART REVIEW, PT 

CARE COORDINATION





History of Present Illness


History of Present Illness


[id number]


Patient seen and examined


Discussed with RN


Chart reviewed


His wife is present


Still having a lot of pain








01/01/20


Pt seen and examined


pleasant


Pt ambulated w/out difficulty


DW pt about their care


DW RN


Reviewed pt's chart 





 12/31/19


Patient seen and examined


Chart reviewed


Discussed with RN





Vitals/I&O


Vitals/I&O:





                                   Vital Signs








  Date Time  Temp Pulse Resp B/P (MAP) Pulse Ox O2 Delivery O2 Flow Rate FiO2


 


1/5/20 11:59 98.1 68 18 104/47 (66) 100 Room Air  





 98.1       














                                    I & O   


 


 1/4/20 1/4/20 1/5/20





 15:00 23:00 07:00


 


Intake Total 220 ml 50 ml 300 ml


 


Balance 220 ml 50 ml 300 ml











Physical Exam


Physical Exam:


GENERAL:  Propped up in bed, resting quietly, arouses to name 


HEENT:  Pupils equally round.  Oropharynx pink and moist.


NECK:  Supple, no lymphadenopathy.


LUNGS:  Clear to auscultation.


HEART:  S1, S2.


ABDOMEN:  Obese, soft, nontender, bowel sounds present.  Multiple scars and 

right-sided ostomy.


EXTREMITIES:  No gross edema or cyanosis.


SKIN:  Warm to touch without signs of rash. Several sacrococcygeal fistulas  


NEUROLOGIC:  Answering questions appropriately.  


RUE-PICC (12/31) clean


General:  Alert, Oriented X3, Cooperative, No acute distress


Heart:  Regular rate, Normal S1, Normal S2


Lungs:  Clear


Abdomen:  Normal bowel sounds, Soft, Other (Multiple scars and right-sided 

ostomy.)


Extremities:  No clubbing, No cyanosis


Skin:  No rashes, Other ( Several sacrococcygeal fistula, 3 packed. + drainage, 

no surrounding redness  )





Labs


Labs:





Laboratory Tests








Test


 1/5/20


03:05


 


Sodium Level


 141 mmol/L


(136-145)


 


Potassium Level


 3.9 mmol/L


(3.5-5.1)


 


Chloride Level


 107 mmol/L


()


 


Carbon Dioxide Level


 24 mmol/L


(21-32)


 


Anion Gap 10 (6-14) 


 


Blood Urea Nitrogen


 17 mg/dL


(8-26)


 


Creatinine


 1.2 mg/dL


(0.7-1.3)


 


Estimated GFR


(Cockcroft-Gault) 61.8 





 


Glucose Level


 97 mg/dL


(70-99)


 


Calcium Level


 9.0 mg/dL


(8.5-10.1)











Assessment and Plan


Assessmemt and Plan


Non-surgical perianal wound - with bleeding


Severe Crohn's with multiple surgeries s/p ostomy (multiple fissures and 

abdominal wounds)


Chronic pain


Hyperlipidemia


Hypertension


GERD


Incidental 5 mm rt kidney stone


Hiatal hernia with small bowel loops





Plan


IV antibiotics


Wound care


Home meds


PT OT


DVT prophylaxis


Discharge when okay with subspecialist





Comment


Review of Relevant


I have reviewed the following items crow (where applicable) has been applied.











RANDALL PAULSON III DO            Jan 5, 2020 13:23

## 2020-01-06 VITALS — DIASTOLIC BLOOD PRESSURE: 64 MMHG | SYSTOLIC BLOOD PRESSURE: 107 MMHG

## 2020-01-06 VITALS — DIASTOLIC BLOOD PRESSURE: 70 MMHG | SYSTOLIC BLOOD PRESSURE: 145 MMHG

## 2020-01-06 VITALS — SYSTOLIC BLOOD PRESSURE: 99 MMHG

## 2020-01-06 VITALS — DIASTOLIC BLOOD PRESSURE: 74 MMHG | SYSTOLIC BLOOD PRESSURE: 125 MMHG

## 2020-01-06 LAB
ANION GAP SERPL CALC-SCNC: 9 MMOL/L (ref 6–14)
BASOPHILS # BLD AUTO: 0 X10^3/UL (ref 0–0.2)
BASOPHILS NFR BLD: 1 % (ref 0–3)
BUN SERPL-MCNC: 14 MG/DL (ref 8–26)
CALCIUM SERPL-MCNC: 8.8 MG/DL (ref 8.5–10.1)
CHLORIDE SERPL-SCNC: 107 MMOL/L (ref 98–107)
CK SERPL-CCNC: 61 U/L (ref 39–308)
CO2 SERPL-SCNC: 26 MMOL/L (ref 21–32)
CREAT SERPL-MCNC: 1.1 MG/DL (ref 0.7–1.3)
EOSINOPHIL NFR BLD: 0.2 X10^3/UL (ref 0–0.7)
EOSINOPHIL NFR BLD: 3 % (ref 0–3)
ERYTHROCYTE [DISTWIDTH] IN BLOOD BY AUTOMATED COUNT: 14.1 % (ref 11.5–14.5)
GFR SERPLBLD BASED ON 1.73 SQ M-ARVRAT: 68.3 ML/MIN
GLUCOSE SERPL-MCNC: 100 MG/DL (ref 70–99)
HCT VFR BLD CALC: 37.9 % (ref 39–53)
HGB BLD-MCNC: 12.6 G/DL (ref 13–17.5)
LYMPHOCYTES # BLD: 1.1 X10^3/UL (ref 1–4.8)
LYMPHOCYTES NFR BLD AUTO: 20 % (ref 24–48)
MCH RBC QN AUTO: 30 PG (ref 25–35)
MCHC RBC AUTO-ENTMCNC: 33 G/DL (ref 31–37)
MCV RBC AUTO: 90 FL (ref 79–100)
MONO #: 0.6 X10^3/UL (ref 0–1.1)
MONOCYTES NFR BLD: 10 % (ref 0–9)
NEUT #: 3.9 X10^3/UL (ref 1.8–7.7)
NEUTROPHILS NFR BLD AUTO: 67 % (ref 31–73)
PLATELET # BLD AUTO: 161 X10^3/UL (ref 140–400)
POTASSIUM SERPL-SCNC: 4 MMOL/L (ref 3.5–5.1)
RBC # BLD AUTO: 4.2 X10^6/UL (ref 4.3–5.7)
SODIUM SERPL-SCNC: 142 MMOL/L (ref 136–145)
WBC # BLD AUTO: 5.8 X10^3/UL (ref 4–11)

## 2020-01-06 RX ADMIN — Medication SCH CAP: at 08:59

## 2020-01-06 RX ADMIN — OXYCODONE HYDROCHLORIDE AND ACETAMINOPHEN SCH MG: 500 TABLET ORAL at 08:59

## 2020-01-06 RX ADMIN — MESALAMINE SCH MG: 400 CAPSULE, DELAYED RELEASE ORAL at 12:57

## 2020-01-06 RX ADMIN — MESALAMINE SCH MG: 400 CAPSULE, DELAYED RELEASE ORAL at 08:59

## 2020-01-06 RX ADMIN — PIPERACILLIN SODIUM AND TAZOBACTAM SODIUM SCH MLS/HR: 3; .375 INJECTION, POWDER, LYOPHILIZED, FOR SOLUTION INTRAVENOUS at 06:10

## 2020-01-06 RX ADMIN — PIPERACILLIN SODIUM AND TAZOBACTAM SODIUM SCH MLS/HR: 3; .375 INJECTION, POWDER, LYOPHILIZED, FOR SOLUTION INTRAVENOUS at 00:30

## 2020-01-06 RX ADMIN — PIPERACILLIN SODIUM AND TAZOBACTAM SODIUM SCH MLS/HR: 3; .375 INJECTION, POWDER, LYOPHILIZED, FOR SOLUTION INTRAVENOUS at 11:24

## 2020-01-06 NOTE — PDOC
PROGRESS NOTES


Chief Complaint


Chief Complaint


A/P:


Non-surgical perianal wound - with bleeding


Severe Crohn's with multiple surgeries s/p ostomy (multiple fissures and 

abdominal wounds)


Multiple deep ulcers along the distal sacrum and buttocks extend into the right 

ischiorectal fat and to the former site of the coccyx. The  coccyx and distal 

sacrum have been completely eroded. There is some debris within the tracts, but 

no drainable collection.


Large parastomal hernia on the right containing multiple nonobstructed small 

bowel loops


Chronic pain


Hyperlipidemia


Hypertension


GERD


Incidental 5 mm rt kidney stone


Hiatal hernia with small bowel loops





Cont trial with empiric zosyn/ daptomycin


CT-guided biopsy of the sacrum - chronic osteomyelitis





Social service to assist with discharge antibiotics


Recommended transfer to [facility] for I and D, ostectomy and possible flap 

if eligible


Per staff [facility] did not accept pt for transfer





Weekly labs CBCD/BUN/CREAT/CPK/ESR/LFT. faxed to [phone]


WOUND CARE per wound team


Offloading





29 MIN PT EXAM, CHART REVIEW, > 50% OF TIME SPENT WITH EXAM, CHART REVIEW, PT 

CARE COORDINATION





History of Present Illness


History of Present Illness


Mr Kaur is a 58 yo w/ PMHx severe Crohn's disease, status post ostomy and 

multiple fistula repairs.  [facility] 

bleeding from a coccyx wound.


He was evaluated by General Surgery with no surgical plans at this time and 

placed on antibiotic trial per ID.


Acute and chronic OM changes on CT abdomen and pelvis ,Pt has been treated with 

previous OM at [facility] in the past. ESR 35. 


Anaerobic-aerobic culture with Gram stain was obtained with NGTD. 


S/P IR biopsy 12/27 - Bone biopsy confirmed chronic osteomyelitis, no evidence 

of acute osteomyelitis.





The patient says he has had wounds off and on for years.  Over the last 6 months

or so, he has had several rounds of antibiotics for odor.  A previous culture in

November grew mixed site claudia.  The patient denies fevers, chills, sweats or 

body aches.  Denies nausea, vomiting or increased ostomy output.  Denies rash or

itching. 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





He is ambulating well. Feels overall improved. Wishes for home with outpatient 

wound care and home infusions, 13.5g zosyn continous for 28 days and daptomycin 

with CBC, BUN, Cr, CPK, ESR, and LFTs weekly.





Plan per ID:


Cont trial with empiric dapto and zosyn 


script in chart


Probiotics 


PICC placed, will continue PICC care





[id number]


Patient seen and examined


Discussed with RN


Chart reviewed


His wife is present


Still having a lot of pain





01/01/20


Pt seen and examined


pleasant


Pt ambulated w/out difficulty


DW pt about their care


DW RN


Reviewed pt's chart 





 12/31/19


Patient seen and examined


Chart reviewed


Discussed with RN





Vitals


Vitals





Vital Signs








  Date Time  Temp Pulse Resp B/P (MAP) Pulse Ox O2 Delivery O2 Flow Rate FiO2


 


1/6/20 03:00 97.5 54 20 99/ 98 Room Air  





 97.5       











Physical Exam


Physical Exam


GENERAL:  Propped up in bed, resting quietly, arouses to name 


HEENT:  Pupils equally round.  Oropharynx pink and moist.


NECK:  Supple, no lymphadenopathy.


LUNGS:  Clear to auscultation.


HEART:  S1, S2.


ABDOMEN:  Obese, soft, nontender, bowel sounds present.  Multiple scars and 

right-sided ostomy.


EXTREMITIES:  No gross edema or cyanosis.


SKIN:  Warm to touch without signs of rash. Several sacrococcygeal fistulas  


NEUROLOGIC:  Answering questions appropriately.  


RUE-PICC (12/31) clean


General:  Alert, Oriented X3, Cooperative, No acute distress


Heart:  Regular rate, Normal S1, Normal S2


Lungs:  Clear


Abdomen:  Normal bowel sounds, Soft, Other (Multiple scars and right-sided 

ostomy.)


Extremities:  No clubbing, No cyanosis


Skin:  No rashes, Other ( Several sacrococcygeal fistula, 3 packed. + drainage, 

no surrounding redness  )





Labs


LABS





Laboratory Tests








Test


 1/6/20


04:10


 


White Blood Count


 5.8 x10^3/uL


(4.0-11.0)


 


Red Blood Count


 4.20 x10^6/uL


(4.30-5.70)


 


Hemoglobin


 12.6 g/dL


(13.0-17.5)


 


Hematocrit


 37.9 %


(39.0-53.0)


 


Mean Corpuscular Volume 90 fL () 


 


Mean Corpuscular Hemoglobin 30 pg (25-35) 


 


Mean Corpuscular Hemoglobin


Concent 33 g/dL


(31-37)


 


Red Cell Distribution Width


 14.1 %


(11.5-14.5)


 


Platelet Count


 161 x10^3/uL


(140-400)


 


Neutrophils (%) (Auto) 67 % (31-73) 


 


Lymphocytes (%) (Auto) 20 % (24-48) 


 


Monocytes (%) (Auto) 10 % (0-9) 


 


Eosinophils (%) (Auto) 3 % (0-3) 


 


Basophils (%) (Auto) 1 % (0-3) 


 


Neutrophils # (Auto)


 3.9 x10^3/uL


(1.8-7.7)


 


Lymphocytes # (Auto)


 1.1 x10^3/uL


(1.0-4.8)


 


Monocytes # (Auto)


 0.6 x10^3/uL


(0.0-1.1)


 


Eosinophils # (Auto)


 0.2 x10^3/uL


(0.0-0.7)


 


Basophils # (Auto)


 0.0 x10^3/uL


(0.0-0.2)


 


Erythrocyte Sedimentation Rate 31 (0-15) 


 


Sodium Level


 142 mmol/L


(136-145)


 


Potassium Level


 4.0 mmol/L


(3.5-5.1)


 


Chloride Level


 107 mmol/L


()


 


Carbon Dioxide Level


 26 mmol/L


(21-32)


 


Anion Gap 9 (6-14) 


 


Blood Urea Nitrogen


 14 mg/dL


(8-26)


 


Creatinine


 1.1 mg/dL


(0.7-1.3)


 


Estimated GFR


(Cockcroft-Gault) 68.3 





 


Glucose Level


 100 mg/dL


(70-99)


 


Calcium Level


 8.8 mg/dL


(8.5-10.1)


 


Creatine Kinase


 61 U/L


()











Comment


Review of Relevant


I have reviewed the following items crow (where applicable) has been applied.


Labs





Laboratory Tests








Test


 1/5/20


03:05 1/6/20


04:10


 


Sodium Level


 141 mmol/L


(136-145) 142 mmol/L


(136-145)


 


Potassium Level


 3.9 mmol/L


(3.5-5.1) 4.0 mmol/L


(3.5-5.1)


 


Chloride Level


 107 mmol/L


() 107 mmol/L


()


 


Carbon Dioxide Level


 24 mmol/L


(21-32) 26 mmol/L


(21-32)


 


Anion Gap 10 (6-14)  9 (6-14) 


 


Blood Urea Nitrogen


 17 mg/dL


(8-26) 14 mg/dL


(8-26)


 


Creatinine


 1.2 mg/dL


(0.7-1.3) 1.1 mg/dL


(0.7-1.3)


 


Estimated GFR


(Cockcroft-Gault) 61.8 


 68.3 





 


Glucose Level


 97 mg/dL


(70-99) 100 mg/dL


(70-99)


 


Calcium Level


 9.0 mg/dL


(8.5-10.1) 8.8 mg/dL


(8.5-10.1)


 


White Blood Count


 


 5.8 x10^3/uL


(4.0-11.0)


 


Red Blood Count


 


 4.20 x10^6/uL


(4.30-5.70)


 


Hemoglobin


 


 12.6 g/dL


(13.0-17.5)


 


Hematocrit


 


 37.9 %


(39.0-53.0)


 


Mean Corpuscular Volume  90 fL () 


 


Mean Corpuscular Hemoglobin  30 pg (25-35) 


 


Mean Corpuscular Hemoglobin


Concent 


 33 g/dL


(31-37)


 


Red Cell Distribution Width


 


 14.1 %


(11.5-14.5)


 


Platelet Count


 


 161 x10^3/uL


(140-400)


 


Neutrophils (%) (Auto)  67 % (31-73) 


 


Lymphocytes (%) (Auto)  20 % (24-48) 


 


Monocytes (%) (Auto)  10 % (0-9) 


 


Eosinophils (%) (Auto)  3 % (0-3) 


 


Basophils (%) (Auto)  1 % (0-3) 


 


Neutrophils # (Auto)


 


 3.9 x10^3/uL


(1.8-7.7)


 


Lymphocytes # (Auto)


 


 1.1 x10^3/uL


(1.0-4.8)


 


Monocytes # (Auto)


 


 0.6 x10^3/uL


(0.0-1.1)


 


Eosinophils # (Auto)


 


 0.2 x10^3/uL


(0.0-0.7)


 


Basophils # (Auto)


 


 0.0 x10^3/uL


(0.0-0.2)


 


Erythrocyte Sedimentation Rate  31 (0-15) 


 


Creatine Kinase


 


 61 U/L


()








Laboratory Tests








Test


 1/6/20


04:10


 


White Blood Count


 5.8 x10^3/uL


(4.0-11.0)


 


Red Blood Count


 4.20 x10^6/uL


(4.30-5.70)


 


Hemoglobin


 12.6 g/dL


(13.0-17.5)


 


Hematocrit


 37.9 %


(39.0-53.0)


 


Mean Corpuscular Volume 90 fL () 


 


Mean Corpuscular Hemoglobin 30 pg (25-35) 


 


Mean Corpuscular Hemoglobin


Concent 33 g/dL


(31-37)


 


Red Cell Distribution Width


 14.1 %


(11.5-14.5)


 


Platelet Count


 161 x10^3/uL


(140-400)


 


Neutrophils (%) (Auto) 67 % (31-73) 


 


Lymphocytes (%) (Auto) 20 % (24-48) 


 


Monocytes (%) (Auto) 10 % (0-9) 


 


Eosinophils (%) (Auto) 3 % (0-3) 


 


Basophils (%) (Auto) 1 % (0-3) 


 


Neutrophils # (Auto)


 3.9 x10^3/uL


(1.8-7.7)


 


Lymphocytes # (Auto)


 1.1 x10^3/uL


(1.0-4.8)


 


Monocytes # (Auto)


 0.6 x10^3/uL


(0.0-1.1)


 


Eosinophils # (Auto)


 0.2 x10^3/uL


(0.0-0.7)


 


Basophils # (Auto)


 0.0 x10^3/uL


(0.0-0.2)


 


Erythrocyte Sedimentation Rate 31 (0-15) 


 


Sodium Level


 142 mmol/L


(136-145)


 


Potassium Level


 4.0 mmol/L


(3.5-5.1)


 


Chloride Level


 107 mmol/L


()


 


Carbon Dioxide Level


 26 mmol/L


(21-32)


 


Anion Gap 9 (6-14) 


 


Blood Urea Nitrogen


 14 mg/dL


(8-26)


 


Creatinine


 1.1 mg/dL


(0.7-1.3)


 


Estimated GFR


(Cockcroft-Gault) 68.3 





 


Glucose Level


 100 mg/dL


(70-99)


 


Calcium Level


 8.8 mg/dL


(8.5-10.1)


 


Creatine Kinase


 61 U/L


()








Microbiology


12/27/19 Anaerobic/Aerobic Culture - Final, Complete


           


12/27/19 Anaerobic Culture Result 1 (LION) - Final, Complete


           


12/27/19 Aerobic Culture - Final, Complete


           


12/27/19 Aerobic Culture Result 1 (LION) - Final, Complete


           


12/27/19 Gram Stain - Final, Complete


           


12/27/19 Gram Stain Result 1 (LION) - Final, Complete


           


12/27/19 Gram Stain Result 2 (LION) - Final, Complete


Medications





Current Medications


Acetaminophen (Tylenol) 650 mg PRN Q4HRS  PRN PO PAIN;  Start 12/23/19 at 14:00;

 Stop 12/25/19 at 08:20;  Status DC


Ergocalciferol (Vitamin D2) 50,000 unit QMONTH PO ;  Start 1/22/20 at 09:00


Famotidine (Pepcid) 20 mg HS PO  Last administered on 1/5/20at 20:43;  Start 

12/23/19 at 21:00


Guaifenesin (Robitussin Dm) 5 ml PRN QID  PRN PO COUGH;  Start 12/23/19 at 14:00


Ascorbic Acid (Vitamin C) 500 mg BID PO  Last administered on 1/5/20at 20:43;  

Start 12/23/19 at 21:00


Clindamycin HCl (Cleocin) 300 mg TID PO  Last administered on 12/24/19at 08:39; 

Start 12/23/19 at 14:00;  Stop 12/24/19 at 09:30;  Status DC


Magnesium Hydroxide (Milk Of Magnesia) 2,400 mg PRN DAILY  PRN PO CONSTIPATION; 

Start 12/23/19 at 14:00


Mesalamine (Delzicol) 800 mg TID PO  Last administered on 1/5/20at 20:43;  Start

12/23/19 at 15:00


Non-Formulary Medication (Protein Supplement (Nutritional Drink Mix)) 420 gm 

DAILY PO ;  Start 12/24/19 at 09:00;  Status UNV


Iohexol (Omnipaque 300 Mg/ml) 75 ml 1X  ONCE IV  Last administered on 12/24/19at

08:29;  Start 12/24/19 at 08:00;  Stop 12/24/19 at 08:01;  Status DC


Acetaminophen (Tylenol) 500 mg PRN Q6HRS  PRN PO MILD PAIN / TEMP;  Start 

12/25/19 at 08:30


Acetaminophen/ Codeine Phosphate (Tylenol #3) 1 tab PRN Q6HRS  PRN PO MODERATE 

PAIN Last administered on 12/27/19at 20:57;  Start 12/25/19 at 08:30


Oxycodone/ Acetaminophen (Percocet 5/325) 1 tab PRN Q4HRS  PRN PO SEVERE PAIN 

Last administered on 1/5/20at 15:38;  Start 12/25/19 at 08:30


Morphine Sulfate (Morphine Sulfate) 2 mg PRN Q2HR  PRN IV PAIN Last administered

on 12/30/19at 16:07;  Start 12/25/19 at 08:30


Ondansetron HCl (Zofran) 4 mg PRN Q6HRS  PRN IVP NAUSEA/VOMITING;  Start 

12/27/19 at 09:30


Diphenhydramine HCl (Benadryl) 25 mg PRN QHS  PRN PO INSOMNIA;  Start 12/27/19 

at 09:30


Lidocaine HCl (Buffered Lidocaine 1%) 3 ml STK-MED ONCE .ROUTE ;  Start 12/27/19

at 10:01;  Stop 12/27/19 at 10:02;  Status DC


Midazolam HCl (Versed) 2 mg STK-MED ONCE .ROUTE ;  Start 12/27/19 at 10:20;  

Stop 12/27/19 at 10:21;  Status DC


Fentanyl Citrate (Fentanyl 2ml Vial) 100 mcg STK-MED ONCE .ROUTE ;  Start 

12/27/19 at 10:20;  Stop 12/27/19 at 10:21;  Status DC


Lidocaine HCl (Buffered Lidocaine 1%) 3 ml 1X  ONCE IJ  Last administered on 

12/27/19at 10:58;  Start 12/27/19 at 11:00;  Stop 12/27/19 at 11:01;  Status DC


Midazolam HCl (Versed) 2 mg 1X  ONCE IV  Last administered on 12/27/19at 10:57; 

Start 12/27/19 at 11:00;  Stop 12/27/19 at 11:01;  Status DC


Fentanyl Citrate (Fentanyl 2ml Vial) 100 mcg 1X  ONCE IV  Last administered on 

12/27/19at 10:57;  Start 12/27/19 at 11:00;  Stop 12/27/19 at 11:01;  Status DC


Vancomycin HCl (Vanco Per Pharmacy) 1 each PRN DAILY  PRN MC SEE COMMENTS Last 

administered on 12/29/19at 07:39;  Start 12/27/19 at 13:00;  Stop 12/30/19 at 

11:36;  Status DC


Piperacillin Sod/ Tazobactam Sod (Zosyn Per Pharmacy) 1 each PRN DAILY  PRN MC 

SEE COMMENTS;  Start 12/27/19 at 13:00


Vancomycin HCl 2 gm/Sodium Chloride 500 ml @  250 mls/hr 1X  ONCE IV  Last 

administered on 12/27/19at 15:01;  Start 12/27/19 at 14:00;  Stop 12/27/19 at 

15:59;  Status DC


Piperacillin Sod/ Tazobactam Sod 3.375 gm/Sodium Chloride 50 ml @  100 mls/hr 

Q6HRS IV  Last administered on 1/6/20at 06:10;  Start 12/27/19 at 13:15


Vancomycin HCl 1.5 gm/Sodium Chloride 500 ml @  250 mls/hr Q12H IV  Last 

administered on 12/29/19at 04:08;  Start 12/28/19 at 03:00;  Stop 12/29/19 at 

06:00;  Status DC


Vancomycin HCl (Vancomycin Trough Level) 1 each 1X  ONCE MC  Last administered 

on 12/29/19at 03:00;  Start 12/29/19 at 02:30;  Stop 12/29/19 at 02:31;  Status 

DC


Lactobacillus Rhamnosus (Culturelle) 1 cap BID PO  Last administered on 1/5/20at

20:43;  Start 12/28/19 at 21:00


Vancomycin HCl 1.5 gm/Sodium Chloride 500 ml @  250 mls/hr Q8H IV ;  Start 

12/29/19 at 12:00;  Stop 12/29/19 at 07:31;  Status DC


Vancomycin HCl (Vancomycin Trough Level) 1 each 1X  ONCE MC ;  Start 12/30/19 at

11:30;  Stop 12/29/19 at 07:31;  Status DC


Vancomycin HCl 2 gm/Sodium Chloride 500 ml @  250 mls/hr Q12H IV  Last 

administered on 12/30/19at 04:19;  Start 12/29/19 at 16:00;  Stop 12/30/19 at 

11:36;  Status DC


Vancomycin HCl (Vancomycin Trough Level) 1 each 1X  ONCE MC ;  Start 12/30/19 at

15:30;  Stop 12/30/19 at 11:36;  Status DC


Daptomycin 530 mg/ Sodium Chloride 50 ml @  100 mls/hr Q24H IV  Last 

administered on 1/5/20at 14:27;  Start 12/30/19 at 12:30


Lidocaine HCl (Buffered Lidocaine 1%) 3 ml STK-MED ONCE .ROUTE ;  Start 12/31/19

at 14:11;  Stop 12/31/19 at 14:11;  Status DC


Lidocaine HCl (Buffered Lidocaine 1%) 6 ml 1X  ONCE INJ ;  Start 12/31/19 at 

14:30;  Stop 12/31/19 at 14:31;  Status DC





Active Scripts


Active


Reported


Hydrocodone-Apap 5-325  ** (Hydrocodone Bit/Acetaminophen) 1 Tab Tablet 1-2 Tab 

PO PRN Q4HRS PRN


Tussin Dm Cough & Chest Syrup (Guaifenesin/Dextromethorphan) 118 Ml Syrup 5 Ml 

PO PRN QID PRN 12 Days


Milk Of Magnesia (Magnesium Hydroxide) 2,400 Mg/10 Ml Oral.susp 2,400 Mg PO PRN 

DAILY PRN


Tylenol (Acetaminophen) 325 Mg Tablet 650 Mg PO PRN Q4HRS PRN


Clindamycin Hcl 300 Mg Capsule 300 Mg PO TID


Vitamin D2 (Ergocalciferol (Vitamin D2)) 50,000 Unit Capsule 1 Cap PO QMONTH


Vitamin C (Ascorbate Calcium) 500 Mg Tablet 500 Mg PO BID


Nutritional Drink Mix (Protein Supplement) 420 Gm Powder 420 Gm PO DAILY


Multi-Vitamin Daily (Multivitamin) 1 Each Tablet 1 Each PO 


Famotidine 20 Mg Tablet 20 Mg PO HS


Asacol Hd (Mesalamine) 800 Mg Tablet.dr 1,600 Mg PO TID


Vitals/I & O





Vital Sign - Last 24 Hours








 1/5/20 1/5/20 1/5/20 1/5/20





 11:59 15:38 15:59 16:38


 


Temp 98.1  97.7 





 98.1  97.7 


 


Pulse 68  61 


 


Resp 18  18 


 


B/P (MAP) 104/47 (66)  108/51 (70) 


 


Pulse Ox 100  96 


 


O2 Delivery Room Air Room Air Room Air Room Air


 


    





    





 1/5/20 1/5/20 1/5/20 1/6/20





 19:00 20:00 23:00 03:00


 


Temp 98.7  97.9 97.5





 98.7  97.9 97.5


 


Pulse 67  60 54


 


Resp 20  20 20


 


B/P (MAP) 105/53 (70)  116/64 (81) 99/


 


Pulse Ox 95  95 98


 


O2 Delivery Room Air Room Air Room Air Room Air














Intake and Output   


 


 1/5/20 1/5/20 1/6/20





 15:00 23:00 07:00


 


Intake Total 440 ml 200 ml 500 ml


 


Output Total  300 ml 


 


Balance 440 ml -100 ml 500 ml

















ADALBERTO FORD MD         Jan 6, 2020 08:10

## 2020-01-06 NOTE — SNU/HH DC
DISCHARGE WITH HOME HEALTH


DISCHARGE INFORMATION:


Discharge Date:  Jan 6, 2020


Final Diagnosis:


Chronic sacral osteomyelitis


Condition on Discharge:  Stable





CODE STATUS:


Code Status:  Full





HOME HEALTH:


Face to Face:


I certify this patient is under my care and that I, or a nurse practitioner or 

physician's assistant working with me, had a face to face encounter that meets 

the physician face to face encounter requirements with this patient on 1/6/2020.


Medical Complications:  Other (Crohns with fistulas)


Skilled Nursing For:  Admin/Educate Injections, IV Infusion Therapy


RN For Eval/Treatment:  Yes


Pt Meets Homebound Status:  Limited distance walking





POST DISCHARGE ORDERS:


Activity Instructions for Disc:  Activity as tolerated


Bathing Instructions:  Shower-keep dressing dry


DIET AFTER DISCHARGE:  Regular


Wound/Incision Care:  Other, see below





CHECKS AFTER DISCHARGE:


Checks after discharge:  Check blood press - daily





FOLLOW-UP:


Follow up with:  PURVI Ashley - fax [phone]


DC TO SNF LABS:  CBC, BUN, Cr, CPK, ESR, LFT Q Mon, [id number]





TREATMENT/EQUIPMENT ORDERS:


Infusion Equipment, home use:  PICC Line





CERTIFICATION STATEMENT:


Certification Statement:


Certification Statement: Based on the above finding, I certify that this patient

is confined to the home and needs intermittent skilled nursing care, physical 

therapy and/or speech therapy, or continues to need occupational therapy.~ This 

patient is under my care, and I have initiated the establishment of the plan of 

care.~ This patient will be followed by myself or a community physician who will

periodically review the plan of care.


Home Meds


Active Scripts


Piperacillin Sodium/Tazobactam (Piperacil-Tazobact 13.5 gm Vl) 13.5 Gm Vial, 

13.5 GM IV DAILY for Cellulitis for 28 Days, #28 EACH


   Prov:ADALBERTO FORD MD         1/6/20


Daptomycin (Daptomycin) 350 Mg Vial, 530 MG IV DAILY for cellulitis for 28 Days,

#43 EACH


   Prov:ADALBERTO FORD MD         1/6/20


Reported Medications


Hydrocodone Bit/Acetaminophen (HYDROCODONE-APAP 5-325  **) 1 Tab Tablet, 1-2 TAB

PO PRN Q4HRS PRN for PAIN, TAB 0 Refills


   12/23/19


Guaifenesin/Dextromethorphan (TUSSIN DM COUGH & CHEST SYRUP) 118 Ml Syrup, 5 ML 

PO PRN QID PRN for COUGH for 12 Days, #240 ML 0 Refills


   12/23/19


Magnesium Hydroxide (MILK OF MAGNESIA) 2,400 Mg/10 Ml Oral.susp, 2400 MG PO PRN 

DAILY PRN for CONSTIPATION, MISC


   12/23/19


Acetaminophen (TYLENOL) 325 Mg Tablet, 650 MG PO PRN Q4HRS PRN for PAIN, TAB


   12/23/19


Ergocalciferol (Vitamin D2) (VITAMIN D2) 50,000 Unit Capsule, 1 CAP PO QMONTH, 

#4 CAP 5 Refills


   9/10/18


Ascorbate Calcium (VITAMIN C) 500 Mg Tablet, 500 MG PO BID, TAB


   9/10/18


Protein Supplement (Nutritional Drink Mix) 420 Gm Powder, 420 GM PO DAILY


   12/1/16


Multivitamin (MULTI-VITAMIN DAILY) 1 Each Tablet, 1 EACH PO


   12/1/16


Famotidine (FAMOTIDINE) 20 Mg Tablet, 20 MG PO HS, TAB


   12/1/16


Mesalamine (ASACOL HD) 800 Mg Tablet.dr, 1600 MG PO TID


   11/30/16


Discontinued Reported Medications


Clindamycin Hcl (CLINDAMYCIN HCL) 300 Mg Capsule, 300 MG PO TID for antibiotic, 

CAP


   12/23/19











ADALBERTO FORD MD         Jan 6, 2020 11:24

## 2020-01-06 NOTE — NUR
Spoke to the wound care clinic in regards to patient leaving today. They stated the patient 
does not need a dressing change at this time and to only reinforce. Pictures and dressing 
change done yesterday by clinic. He is to follow up with the wound care clinic till he can 
get into [facility]. Patient aware.

## 2020-01-06 NOTE — PDOC3
Discharge Summary


Visit Information


Date of Admission:  Dec 23, 2019


Date of Discharge:  Jan 6, 2020


Admitting Diagnosis:  Rosalie-rectal fistula


Final Diagnosis


Chronic sacral osteomyelitis





Brief Hospital Course


Allergies





                                    Allergies








Coded Allergies Type Severity Reaction Last Updated Verified


 


  No Known Drug Allergies    9/11/18 No








Vital Signs





Vital Signs








  Date Time  Temp Pulse Resp B/P (MAP) Pulse Ox O2 Delivery O2 Flow Rate FiO2


 


1/6/20 08:20      Room Air  


 


1/6/20 07:00 97.7 57  107/64 (78) 98   





 97.7       


 


1/6/20 03:00   20     








Lab Results





Laboratory Tests








Test


 1/5/20


03:05 1/6/20


04:10


 


Sodium Level


 141 mmol/L


(136-145) 142 mmol/L


(136-145)


 


Potassium Level


 3.9 mmol/L


(3.5-5.1) 4.0 mmol/L


(3.5-5.1)


 


Chloride Level


 107 mmol/L


() 107 mmol/L


()


 


Carbon Dioxide Level


 24 mmol/L


(21-32) 26 mmol/L


(21-32)


 


Anion Gap 10 (6-14)  9 (6-14) 


 


Blood Urea Nitrogen


 17 mg/dL


(8-26) 14 mg/dL


(8-26)


 


Creatinine


 1.2 mg/dL


(0.7-1.3) 1.1 mg/dL


(0.7-1.3)


 


Estimated GFR


(Cockcroft-Gault) 61.8 


 68.3 





 


Glucose Level


 97 mg/dL


(70-99) 100 mg/dL


(70-99)


 


Calcium Level


 9.0 mg/dL


(8.5-10.1) 8.8 mg/dL


(8.5-10.1)


 


White Blood Count


 


 5.8 x10^3/uL


(4.0-11.0)


 


Red Blood Count


 


 4.20 x10^6/uL


(4.30-5.70)


 


Hemoglobin


 


 12.6 g/dL


(13.0-17.5)


 


Hematocrit


 


 37.9 %


(39.0-53.0)


 


Mean Corpuscular Volume  90 fL () 


 


Mean Corpuscular Hemoglobin  30 pg (25-35) 


 


Mean Corpuscular Hemoglobin


Concent 


 33 g/dL


(31-37)


 


Red Cell Distribution Width


 


 14.1 %


(11.5-14.5)


 


Platelet Count


 


 161 x10^3/uL


(140-400)


 


Neutrophils (%) (Auto)  67 % (31-73) 


 


Lymphocytes (%) (Auto)  20 % (24-48) 


 


Monocytes (%) (Auto)  10 % (0-9) 


 


Eosinophils (%) (Auto)  3 % (0-3) 


 


Basophils (%) (Auto)  1 % (0-3) 


 


Neutrophils # (Auto)


 


 3.9 x10^3/uL


(1.8-7.7)


 


Lymphocytes # (Auto)


 


 1.1 x10^3/uL


(1.0-4.8)


 


Monocytes # (Auto)


 


 0.6 x10^3/uL


(0.0-1.1)


 


Eosinophils # (Auto)


 


 0.2 x10^3/uL


(0.0-0.7)


 


Basophils # (Auto)


 


 0.0 x10^3/uL


(0.0-0.2)


 


Erythrocyte Sedimentation Rate  31 (0-15) 


 


Creatine Kinase


 


 61 U/L


()








Laboratory Tests








Test


 1/6/20


04:10


 


White Blood Count


 5.8 x10^3/uL


(4.0-11.0)


 


Red Blood Count


 4.20 x10^6/uL


(4.30-5.70)


 


Hemoglobin


 12.6 g/dL


(13.0-17.5)


 


Hematocrit


 37.9 %


(39.0-53.0)


 


Mean Corpuscular Volume 90 fL () 


 


Mean Corpuscular Hemoglobin 30 pg (25-35) 


 


Mean Corpuscular Hemoglobin


Concent 33 g/dL


(31-37)


 


Red Cell Distribution Width


 14.1 %


(11.5-14.5)


 


Platelet Count


 161 x10^3/uL


(140-400)


 


Neutrophils (%) (Auto) 67 % (31-73) 


 


Lymphocytes (%) (Auto) 20 % (24-48) 


 


Monocytes (%) (Auto) 10 % (0-9) 


 


Eosinophils (%) (Auto) 3 % (0-3) 


 


Basophils (%) (Auto) 1 % (0-3) 


 


Neutrophils # (Auto)


 3.9 x10^3/uL


(1.8-7.7)


 


Lymphocytes # (Auto)


 1.1 x10^3/uL


(1.0-4.8)


 


Monocytes # (Auto)


 0.6 x10^3/uL


(0.0-1.1)


 


Eosinophils # (Auto)


 0.2 x10^3/uL


(0.0-0.7)


 


Basophils # (Auto)


 0.0 x10^3/uL


(0.0-0.2)


 


Erythrocyte Sedimentation Rate 31 (0-15) 


 


Sodium Level


 142 mmol/L


(136-145)


 


Potassium Level


 4.0 mmol/L


(3.5-5.1)


 


Chloride Level


 107 mmol/L


()


 


Carbon Dioxide Level


 26 mmol/L


(21-32)


 


Anion Gap 9 (6-14) 


 


Blood Urea Nitrogen


 14 mg/dL


(8-26)


 


Creatinine


 1.1 mg/dL


(0.7-1.3)


 


Estimated GFR


(Cockcroft-Gault) 68.3 





 


Glucose Level


 100 mg/dL


(70-99)


 


Calcium Level


 8.8 mg/dL


(8.5-10.1)


 


Creatine Kinase


 61 U/L


()








Brief Hospital Course


Mr Kaur is a 60 yo w/ PMHx severe Crohn's disease, status post ostomy and mu

ltiple fistula repairs.  [facility]

from a coccyx wound.


He was evaluated by General Surgery with no surgical plans at this time and 

placed on antibiotic trial per ID.


Acute and chronic OM changes on CT abdomen and pelvis ,Pt has been treated with 

previous OM at [facility] in the past. ESR 35. 


Anaerobic-aerobic culture with Gram stain was obtained with NGTD. 


S/P IR biopsy 12/27 - Bone biopsy confirmed chronic osteomyelitis, no evidence 

of acute osteomyelitis.





The patient says he has had wounds off and on for years.  Over the last 6 months

or so, he has had several rounds of antibiotics for odor.  A previous culture in

November grew mixed site claudia.  The patient denies fevers, chills, sweats or 

body aches.  Denies nausea, vomiting or increased ostomy output.  Denies rash or

itching. 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





He is ambulating well. Feels overall improved. Wishes for home with outpatient 

wound care and home infusions, 13.5g zosyn continous for 28 days and daptomycin 

with CBC, BUN, Cr, CPK, ESR, and LFTs weekly.





Problems:


Non-surgical perianal wound - with bleeding


Severe Crohn's with multiple surgeries s/p ostomy (multiple fissures and 

abdominal wounds)


Multiple deep ulcers along the distal sacrum and buttocks extend into the right 

ischiorectal fat and to the former site of the coccyx. The  coccyx and distal 

sacrum have been completely eroded. There is some debris within the tracts, but 

no drainable collection.


Large parastomal hernia on the right containing multiple nonobstructed small 

bowel loops


Chronic pain


Hyperlipidemia


Hypertension


GERD


Incidental 5 mm rt kidney stone


Hiatal hernia with small bowel loops





Cont trial with empiric zosyn/ daptomycin


CT-guided biopsy of the sacrum - chronic osteomyelitis





Plan per ID:


Cont trial with empiric dapto and zosyn 


script in chart


Probiotics 


PICC placed, will continue PICC care





Social service to assist with discharge antibiotics


Recommended transfer to [facility] for I and D, ostectomy and possible flap 

if eligible


Per staff [facility] did not accept pt for transfer





Weekly labs CBCD/BUN/CREAT/CPK/ESR/LFT. faxed to [phone]


WOUND CARE per wound team


Offloading





Greater than 30 minutes spent on d/c





Discharge Information


Condition at Discharge:  Improved


Follow Up:  Weeks (2)


Disposition/Orders:  D/C to Home w/ HH


Scheduled


Ascorbate Calcium (Vitamin C) 500 Mg Tablet, 500 MG PO BID, (Reported)


   Entered as Reported by: MARVIN LUNA on 9/10/18 1440


   Last Action: Converted on 12/23/19 1350 by RANDALL PAULSON


Daptomycin (Daptomycin) 350 Mg Vial, 530 MG IV DAILY for cellulitis for 28 Days,

#43


   Prescribed by: ADALBERTO FORD MD on 1/6/20 1121


Ergocalciferol (Vitamin D2) (Vitamin D2) 50,000 Unit Capsule, 1 CAP PO QMONTH, 

#4 Ref 5 (Reported)


   Entered as Reported by: MARVIN LUNA on 9/10/18 1442


   Last Action: Continued on 12/23/19 1350 by RANDALL PAULSON


Famotidine (Famotidine) 20 Mg Tablet, 20 MG PO HS, (Reported)


   Entered as Reported by: JUN CRUZ on 12/1/16 0936


   Last Action: Continued on 12/23/19 1350 by RANDALL PAULSON


Mesalamine (Asacol Hd) 800 Mg Tablet.dr, 1,600 MG PO TID, (Reported)


   Entered as Reported by: JUN CRUZ on 11/30/16 1604


   Last Action: Converted on 12/23/19 1350 by RANDALL PAULSON


Piperacillin Sodium/Tazobactam (Piperacil-Tazobact 13.5 gm Vl) 13.5 Gm Vial, 

13.5 GM IV DAILY for Cellulitis for 28 Days, #28


   Prescribed by: ADALBERTO FORD MD on 1/6/20 1121


Protein Supplement (Nutritional Drink Mix) 420 Gm Powder, 420 GM PO DAILY, 

(Reported)


   Entered as Reported by: JUN CRUZ on 12/1/16 0936


   Last Action: Converted on 12/23/19 1350 by RANDALL PAULSON





Scheduled PRN


Acetaminophen (Tylenol) 325 Mg Tablet, 650 MG PO PRN Q4HRS PRN for PAIN, 

(Reported)


   Entered as Reported by: CHRISTI DIAZ on 12/23/19 1155


   Last Action: Continued on 12/23/19 1350 by RANDALL PAULSON


Guaifenesin/Dextromethorphan (Tussin Dm Cough & Chest Syrup) 118 Ml Syrup, 5 ML 

PO PRN QID PRN for COUGH for 12 Days, #240 Ref 0 (Reported)


   Entered as Reported by: CHRISTI DIAZ on 12/23/19 1155


   Last Action: Continued on 12/23/19 1350 by RANDALL PAULSON


Hydrocodone Bit/Acetaminophen (Hydrocodone-Apap 5-325  **) 1 Tab Tablet, 1-2 TAB

PO PRN Q4HRS PRN for PAIN, Ref 0 (Reported)


   Entered as Reported by: CHRISTI DIAZ on 12/23/19 1155


   Last Action: Reviewed on 12/25/19 0818 by JENIFER VIDALES


Magnesium Hydroxide (Milk Of Magnesia) 2,400 Mg/10 Ml Oral.susp, 2,400 MG PO PRN

DAILY PRN for CONSTIPATION, (Reported)


   Entered as Reported by: CHRISTI DIAZ on 12/23/19 1155


   Last Action: Converted on 12/23/19 1350 by RANDALL PAULSON





Miscellaneous Medications


Multivitamin (Multi-Vitamin Daily) 1 Each Tablet, 1 EACH PO, (Reported)


   Entered as Reported by: JUN CRUZ on 12/1/16 0936


   Last Action: Reviewed on 12/23/19 1155 by CHRISTI DIAZ





Discontinued Medications


Clindamycin Hcl (Clindamycin Hcl) 300 Mg Capsule, 300 MG PO TID for antibiotic, 

(Reported)


   Entered as Reported by: CHRISTI DIAZ on 12/23/19 1155


   Last Action: Converted on 12/23/19 1350 by ADALBERTO PERRY MD         Jan 6, 2020 11:28

## 2020-01-06 NOTE — PDOC
Infectious Disease Note


Subjective


Subjective


Comfortable, 


No c/o F/C/N/V/aches





Vital Sign


Vital Signs





Vital Signs








  Date Time  Temp Pulse Resp B/P (MAP) Pulse Ox O2 Delivery O2 Flow Rate FiO2


 


1/6/20 08:20      Room Air  


 


1/6/20 07:00 97.7 57  107/64 (78) 98   





 97.7       


 


1/6/20 03:00   20     











Physical Exam


PHYSICAL EXAM


GENERAL:  Propped up in bed, resting quietly, arouses to name 


HEENT:  Pupils equally round.  Oropharynx pink and moist.


NECK:  Supple, no lymphadenopathy.


LUNGS:  Clear to auscultation.


HEART:  S1, S2.


ABDOMEN:  Obese, soft, nontender, bowel sounds present.  Multiple scars and 

right-sided ostomy.


EXTREMITIES:  No gross edema or cyanosis.


SKIN:  Warm to touch without signs of rash. Several sacrococcygeal fistulas  


NEUROLOGIC:  Answering questions appropriately.  


RUE-PICC (12/31) clean





Labs


Lab





Laboratory Tests








Test


 1/6/20


04:10


 


White Blood Count


 5.8 x10^3/uL


(4.0-11.0)


 


Red Blood Count


 4.20 x10^6/uL


(4.30-5.70)


 


Hemoglobin


 12.6 g/dL


(13.0-17.5)


 


Hematocrit


 37.9 %


(39.0-53.0)


 


Mean Corpuscular Volume 90 fL () 


 


Mean Corpuscular Hemoglobin 30 pg (25-35) 


 


Mean Corpuscular Hemoglobin


Concent 33 g/dL


(31-37)


 


Red Cell Distribution Width


 14.1 %


(11.5-14.5)


 


Platelet Count


 161 x10^3/uL


(140-400)


 


Neutrophils (%) (Auto) 67 % (31-73) 


 


Lymphocytes (%) (Auto) 20 % (24-48) 


 


Monocytes (%) (Auto) 10 % (0-9) 


 


Eosinophils (%) (Auto) 3 % (0-3) 


 


Basophils (%) (Auto) 1 % (0-3) 


 


Neutrophils # (Auto)


 3.9 x10^3/uL


(1.8-7.7)


 


Lymphocytes # (Auto)


 1.1 x10^3/uL


(1.0-4.8)


 


Monocytes # (Auto)


 0.6 x10^3/uL


(0.0-1.1)


 


Eosinophils # (Auto)


 0.2 x10^3/uL


(0.0-0.7)


 


Basophils # (Auto)


 0.0 x10^3/uL


(0.0-0.2)


 


Erythrocyte Sedimentation Rate 31 (0-15) 


 


Sodium Level


 142 mmol/L


(136-145)


 


Potassium Level


 4.0 mmol/L


(3.5-5.1)


 


Chloride Level


 107 mmol/L


()


 


Carbon Dioxide Level


 26 mmol/L


(21-32)


 


Anion Gap 9 (6-14) 


 


Blood Urea Nitrogen


 14 mg/dL


(8-26)


 


Creatinine


 1.1 mg/dL


(0.7-1.3)


 


Estimated GFR


(Cockcroft-Gault) 68.3 





 


Glucose Level


 100 mg/dL


(70-99)


 


Calcium Level


 8.8 mg/dL


(8.5-10.1)


 


Creatine Kinase


 61 U/L


()








Micro





Microbiology


12/27/19 Anaerobic/Aerobic Culture - Final, Complete


           


12/27/19 Anaerobic Culture Result 1 (LION) - Final, Complete


           


12/27/19 Aerobic Culture - Final, Complete


           


12/27/19 Aerobic Culture Result 1 (LION) - Final, Complete


           


12/27/19 Gram Stain - Final, Complete


           


12/27/19 Gram Stain Result 1 (LION) - Final, Complete


           


12/27/19 Gram Stain Result 2 (LION) - Final, Complete





Objective


Assessment


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 culture neg 

so far 


Crohn's disease with history of ostomy and multiple fistula repairs.


Acute and chronic OM changes on CT abdomen and pelvis ,Pt has been treated with 

previous OM at [facility] in the past . Chronic OM could be from the same


    - ESR 35


    - s/p IR biopsy 12/27


Gastroesophageal reflux disease.


Obesity.


Kidney stone on CT Abdomen





Plan


Plan of Care


[facility] rejected, 


this is chronic osteo, will not get better with iv antibiotics, need infected 

bone resection and flap


need to go to [facility] wound care and then see dr Otero for plastic











VAIL,DI HERNANDEZ MD                Jan 6, 2020 12:02

## 2020-01-06 NOTE — NUR
Patient just left by wheelchair around 1600. No concerns noted upon discharge. [employer] notified. Packet given to transportation and papers given to patient as well. He 
left with all his belongings.